# Patient Record
Sex: FEMALE | Race: BLACK OR AFRICAN AMERICAN | NOT HISPANIC OR LATINO
[De-identification: names, ages, dates, MRNs, and addresses within clinical notes are randomized per-mention and may not be internally consistent; named-entity substitution may affect disease eponyms.]

---

## 2017-09-25 ENCOUNTER — APPOINTMENT (OUTPATIENT)
Dept: CARDIOLOGY | Facility: CLINIC | Age: 67
End: 2017-09-25

## 2017-11-01 ENCOUNTER — APPOINTMENT (OUTPATIENT)
Dept: CARDIOLOGY | Facility: CLINIC | Age: 67
End: 2017-11-01

## 2021-10-25 ENCOUNTER — NON-APPOINTMENT (OUTPATIENT)
Age: 71
End: 2021-10-25

## 2021-10-25 ENCOUNTER — APPOINTMENT (OUTPATIENT)
Dept: CARDIOLOGY | Facility: CLINIC | Age: 71
End: 2021-10-25
Payer: COMMERCIAL

## 2021-10-25 VITALS
DIASTOLIC BLOOD PRESSURE: 76 MMHG | OXYGEN SATURATION: 97 % | BODY MASS INDEX: 29.25 KG/M2 | HEART RATE: 83 BPM | WEIGHT: 182 LBS | HEIGHT: 66 IN | SYSTOLIC BLOOD PRESSURE: 132 MMHG

## 2021-10-25 DIAGNOSIS — Z82.49 FAMILY HISTORY OF ISCHEMIC HEART DISEASE AND OTHER DISEASES OF THE CIRCULATORY SYSTEM: ICD-10-CM

## 2021-10-25 DIAGNOSIS — R94.39 ABNORMAL RESULT OF OTHER CARDIOVASCULAR FUNCTION STUDY: ICD-10-CM

## 2021-10-25 DIAGNOSIS — Z82.3 FAMILY HISTORY OF STROKE: ICD-10-CM

## 2021-10-25 PROCEDURE — 93000 ELECTROCARDIOGRAM COMPLETE: CPT

## 2021-10-25 PROCEDURE — 99204 OFFICE O/P NEW MOD 45 MIN: CPT

## 2021-10-25 RX ORDER — AZITHROMYCIN 250 MG/1
250 TABLET, FILM COATED ORAL
Qty: 6 | Refills: 0 | Status: DISCONTINUED | COMMUNITY
Start: 2021-05-22 | End: 2021-10-25

## 2021-10-25 RX ORDER — LISINOPRIL 10 MG/1
10 TABLET ORAL
Qty: 30 | Refills: 0 | Status: ACTIVE | COMMUNITY
Start: 2021-08-24

## 2021-10-25 RX ORDER — DICLOFENAC SODIUM 100 MG/1
100 TABLET, FILM COATED, EXTENDED RELEASE ORAL
Qty: 30 | Refills: 0 | Status: DISCONTINUED | COMMUNITY
Start: 2021-07-21 | End: 2021-10-25

## 2021-10-25 RX ORDER — MELOXICAM 15 MG/1
15 TABLET ORAL
Qty: 30 | Refills: 0 | Status: DISCONTINUED | COMMUNITY
Start: 2021-07-17 | End: 2021-10-25

## 2021-10-25 RX ORDER — ASPIRIN ENTERIC COATED TABLETS 81 MG 81 MG/1
81 TABLET, DELAYED RELEASE ORAL
Qty: 90 | Refills: 3 | Status: ACTIVE | COMMUNITY
Start: 2021-10-25

## 2021-10-25 RX ORDER — GLIMEPIRIDE 2 MG/1
2 TABLET ORAL
Qty: 30 | Refills: 0 | Status: ACTIVE | COMMUNITY
Start: 2021-08-25

## 2021-10-25 RX ORDER — FAMOTIDINE 40 MG/1
40 TABLET, FILM COATED ORAL
Qty: 30 | Refills: 0 | Status: DISCONTINUED | COMMUNITY
Start: 2021-07-17 | End: 2021-10-25

## 2021-10-25 RX ORDER — METHYLPREDNISOLONE 4 MG/1
4 TABLET ORAL
Qty: 21 | Refills: 0 | Status: DISCONTINUED | COMMUNITY
Start: 2021-08-05 | End: 2021-10-25

## 2021-10-25 NOTE — DISCUSSION/SUMMARY
[FreeTextEntry1] : The patient was examined.  Her blood pressure was 132/76, and her pulse was 83.  Her lungs were clear to auscultation.  Cardiac exam was negative for murmurs rubs or gallops.  Her lower extremities showed trace edema.  The patient was strongly urged to cut out sodium in her diet.  She was told to walk more.  We will start her on indapamide 1.25 mg daily in addition to her other medications.  She will return in 1 month, or earlier if needed.  Total time spent on the day of the encounter was 49  minutes which includes  face-to-face and non face-to-face times personally spent by the physician preparing to see the patient, obtaining  separately obtained history, performing a medically appropriate exam and evaluation, counseling, educating, talking to the family or caregivers, ordering medicines, ordering tests or procedures, referring and communicating with other healthcare  professionals, and documenting clinical information in the electronic health record.

## 2021-10-25 NOTE — REASON FOR VISIT
[FreeTextEntry1] : This is the first office visit for this 71 -year-old female who came back from a trip to [place] to see her family.  She noted that on the trip her legs were swollen.  She also complains of dyspnea on exertion.  She has now been back for 10 days and although they are less swollen they still have a slight swelling.  She is a known hypertensive on amlodipine 10 mg as well as lisinopril 10 mg which she does not take every day.  She takes glimepiride 2 mg for diabetes.  She does have dyspnea on exertion but admits to not walking very much.  She had a stress echocardiogram 5 months ago which showed some mild ischemic changes.  Her ejection fractions were normal with there was segmental wall motion abnormalities.\par She is a nondrinker non-smoker but does have 2 cups of caffeinated tea a day.\par \par Her father  of a myocardial infarction.  Her mother  of a cerebrovascular accident.\par \par She takes glimepiride 2 mg, amlodipine 10 mg, and lisinopril 10 mg although irregularly.  She also takes an aspirin 81 mg

## 2022-03-08 ENCOUNTER — APPOINTMENT (OUTPATIENT)
Dept: CARDIOLOGY | Facility: CLINIC | Age: 72
End: 2022-03-08
Payer: COMMERCIAL

## 2022-03-08 ENCOUNTER — NON-APPOINTMENT (OUTPATIENT)
Age: 72
End: 2022-03-08

## 2022-03-08 VITALS
HEIGHT: 66 IN | BODY MASS INDEX: 29.73 KG/M2 | WEIGHT: 185 LBS | HEART RATE: 81 BPM | DIASTOLIC BLOOD PRESSURE: 79 MMHG | RESPIRATION RATE: 17 BRPM | OXYGEN SATURATION: 99 % | SYSTOLIC BLOOD PRESSURE: 137 MMHG

## 2022-03-08 DIAGNOSIS — Z00.00 ENCOUNTER FOR GENERAL ADULT MEDICAL EXAMINATION W/OUT ABNORMAL FINDINGS: ICD-10-CM

## 2022-03-08 PROCEDURE — 93000 ELECTROCARDIOGRAM COMPLETE: CPT

## 2022-03-08 PROCEDURE — 99214 OFFICE O/P EST MOD 30 MIN: CPT

## 2022-03-08 RX ORDER — ERGOCALCIFEROL 1.25 MG/1
1.25 MG CAPSULE, LIQUID FILLED ORAL
Qty: 13 | Refills: 1 | Status: ACTIVE | COMMUNITY
Start: 2021-06-01 | End: 1900-01-01

## 2022-03-08 RX ORDER — AMLODIPINE BESYLATE 10 MG/1
10 TABLET ORAL DAILY
Qty: 90 | Refills: 3 | Status: ACTIVE | COMMUNITY
Start: 2021-08-24 | End: 1900-01-01

## 2022-03-08 RX ORDER — CYANOCOBALAMIN (VITAMIN B-12) 2000 MCG
2000 TABLET, EXTENDED RELEASE ORAL DAILY
Qty: 90 | Refills: 3 | Status: ACTIVE | COMMUNITY
Start: 2022-03-08 | End: 1900-01-01

## 2022-03-08 RX ORDER — INDAPAMIDE 1.25 MG/1
1.25 TABLET, FILM COATED ORAL
Qty: 90 | Refills: 3 | Status: ACTIVE | COMMUNITY
Start: 2021-10-25 | End: 1900-01-01

## 2022-03-08 NOTE — REVIEW OF SYSTEMS
[Dyspnea on exertion] : dyspnea during exertion [Lower Ext Edema] : lower extremity edema [Negative] : Heme/Lymph [SOB] : shortness of breath [Chest Discomfort] : no chest discomfort

## 2022-03-08 NOTE — REASON FOR VISIT
[FreeTextEntry1] : This is the 2nd  office visit for this 72 -year-old female who came back from a trip to [place] to see her family.  She noted that on the trip her legs were swollen.  She also complains of dyspnea on exertion.  She has now been back for 10 days and although they are less swollen they still have a slight swelling.  She is a known hypertensive on amlodipine 10 mg as well as lisinopril 10 mg which she does not take every day.  She takes glimepiride 2 mg for diabetes.  She does have dyspnea on exertion but admits to not walking very much.  She had a stress echocardiogram 5 months ago which showed some mild ischemic changes.  Her ejection fractions were normal but  there were  segmental wall motion abnormalities.\par : Within a few weeks of another one another the patient lost her brother to sepsis and he was in [facility] and she lost a sister to multiple sclerosis and she was in [place].  The patient feels short of breath occasionally but it may be the need to sigh from the emotional upset because she is crying all the time.  She denies any chest pains but does get occasional palpitations.\par She is a nondrinker non-smoker but does have 2 cups of caffeinated tea a day.\par \par Her father  of a myocardial infarction.  Her mother  of a cerebrovascular accident.\par \par She takes glimepiride 2 mg, amlodipine 10 mg, and lisinopril 10 mg although irregularly.  She also takes an aspirin 81 mg

## 2022-03-08 NOTE — DISCUSSION/SUMMARY
[FreeTextEntry1] : The patient was examined.  Her blood pressure was 137/79, and her pulse was 81.  Her lungs were clear to auscultation.  Cardiac exam was negative for murmurs rubs or gallops.  Her lower extremities showed trace edema.  The patient was strongly urged to cut out sodium in her diet.  She was told to walk more.  We will continue t her on indapamide 1.25 mg daily in addition to her other medications.  She will return in 4 months, or earlier if needed.  Total time spent on the day of the encounter was 49  minutes which includes  face-to-face and non face-to-face times personally spent by the physician preparing to see the patient, obtaining  separately obtained history, performing a medically appropriate exam and evaluation, counseling, educating, talking to the family or caregivers, ordering medicines, ordering tests or procedures, referring and communicating with other healthcare  professionals, and documenting clinical information in the electronic health record.

## 2022-07-12 ENCOUNTER — APPOINTMENT (OUTPATIENT)
Dept: CARDIOLOGY | Facility: CLINIC | Age: 72
End: 2022-07-12

## 2022-07-12 ENCOUNTER — NON-APPOINTMENT (OUTPATIENT)
Age: 72
End: 2022-07-12

## 2022-07-12 VITALS
WEIGHT: 181 LBS | SYSTOLIC BLOOD PRESSURE: 122 MMHG | HEART RATE: 79 BPM | OXYGEN SATURATION: 97 % | DIASTOLIC BLOOD PRESSURE: 71 MMHG | BODY MASS INDEX: 29.21 KG/M2

## 2022-07-12 DIAGNOSIS — E55.9 VITAMIN D DEFICIENCY, UNSPECIFIED: ICD-10-CM

## 2022-07-12 DIAGNOSIS — R60.0 LOCALIZED EDEMA: ICD-10-CM

## 2022-07-12 DIAGNOSIS — I10 ESSENTIAL (PRIMARY) HYPERTENSION: ICD-10-CM

## 2022-07-12 DIAGNOSIS — R06.00 DYSPNEA, UNSPECIFIED: ICD-10-CM

## 2022-07-12 PROCEDURE — 99214 OFFICE O/P EST MOD 30 MIN: CPT

## 2022-07-12 PROCEDURE — 93000 ELECTROCARDIOGRAM COMPLETE: CPT

## 2022-07-12 RX ORDER — AMOXICILLIN 875 MG/1
875 TABLET, FILM COATED ORAL
Qty: 20 | Refills: 0 | Status: ACTIVE | COMMUNITY
Start: 2022-05-04

## 2022-07-12 RX ORDER — IRON POLYSACCHARIDE COMPLEX 150 MG
150 CAPSULE ORAL
Qty: 30 | Refills: 0 | Status: ACTIVE | COMMUNITY
Start: 2022-05-03

## 2022-07-12 RX ORDER — CHLORHEXIDINE GLUCONATE, 0.12% ORAL RINSE 1.2 MG/ML
0.12 SOLUTION DENTAL
Qty: 118 | Refills: 0 | Status: ACTIVE | COMMUNITY
Start: 2022-04-27

## 2022-07-12 NOTE — REASON FOR VISIT
[FreeTextEntry1] : This is the 3rd   office visit for this 72 -year-old female who came back from a trip to [place] to see her family.  She noted that on the trip her legs were swollen.  She also complains of dyspnea on exertion.  She has now been back for 10 days and although they are less swollen they still have a slight swelling.  She is a known hypertensive on amlodipine 10 mg as well as lisinopril 10 mg which she does not take every day.  She takes glimepiride 2 mg for diabetes.  She does have dyspnea on exertion but admits to not walking very much.  She had a stress echocardiogram 5 months ago which showed some mild ischemic changes.  Her ejection fractions were normal but  there were  segmental wall motion abnormalities.\par 3/8/2022: Within a few weeks of another one another the patient lost her brother to sepsis and he was in [facility] and she lost a sister to multiple sclerosis and she was in [place].  The patient feels short of breath occasionally but it may be the need to sigh from the emotional upset because she is crying all the time.  She denies any chest pains but does get occasional palpitations.\par 2022: The patient has no new symptoms.  She is feeling better.  She denies any chest pains, shortness of breath at rest, or palpitations.  She does have some dyspnea on exertion.\par She is a nondrinker non-smoker but does have 2 cups of caffeinated tea a day.\par \par Her father  of a myocardial infarction.  Her mother  of a cerebrovascular accident.\par \par She takes glimepiride 2 mg, amlodipine 10 mg, and lisinopril 10 mg although irregularly.  She also takes an aspirin 81 mg

## 2022-07-12 NOTE — REVIEW OF SYSTEMS
[Dyspnea on exertion] : dyspnea during exertion [Lower Ext Edema] : lower extremity edema [Negative] : Heme/Lymph [SOB] : no shortness of breath [Chest Discomfort] : no chest discomfort

## 2022-07-12 NOTE — DISCUSSION/SUMMARY
[EKG obtained to assist in diagnosis and management of assessed problem(s)] : EKG obtained to assist in diagnosis and management of assessed problem(s) [FreeTextEntry1] : The patient was examined.  Her blood pressure was 122/71, and her pulse was 79.  Her lungs were clear to auscultation.  Cardiac exam was negative for murmurs rubs or gallops.  Her lower extremities showed trace edema.  The patient was strongly urged to cut out sodium in her diet.  She was told to walk more.  We will continue t her on indapamide 1.25 mg daily in addition to her other medications.  She will return in 4 months, or earlier if needed.  Total time spent on the day of the encounter was 33 minutes which includes  face-to-face and non face-to-face times personally spent by the physician preparing to see the patient, obtaining  separately obtained history, performing a medically appropriate exam and evaluation, counseling, educating, talking to the family or caregivers, ordering medicines, ordering tests or procedures, referring and communicating with other healthcare  professionals, and documenting clinical information in the electronic health record.

## 2023-07-10 NOTE — REVIEW OF SYSTEMS
[Dyspnea on exertion] : dyspnea during exertion [Chest Discomfort] : chest discomfort [Lower Ext Edema] : lower extremity edema [Negative] : Heme/Lymph no

## 2023-10-19 ENCOUNTER — EMERGENCY (EMERGENCY)
Facility: HOSPITAL | Age: 73
LOS: 1 days | Discharge: HOME | End: 2023-10-19
Admitting: EMERGENCY MEDICINE
Payer: COMMERCIAL

## 2023-10-19 VITALS
TEMPERATURE: 98 F | HEART RATE: 74 BPM | SYSTOLIC BLOOD PRESSURE: 153 MMHG | OXYGEN SATURATION: 100 % | RESPIRATION RATE: 16 BRPM | DIASTOLIC BLOOD PRESSURE: 59 MMHG

## 2023-10-19 LAB
FLUAV AG NPH QL: SIGNIFICANT CHANGE UP
FLUAV AG NPH QL: SIGNIFICANT CHANGE UP
FLUBV AG NPH QL: SIGNIFICANT CHANGE UP
FLUBV AG NPH QL: SIGNIFICANT CHANGE UP
RSV RNA NPH QL NAA+NON-PROBE: SIGNIFICANT CHANGE UP
RSV RNA NPH QL NAA+NON-PROBE: SIGNIFICANT CHANGE UP
SARS-COV-2 RNA SPEC QL NAA+PROBE: SIGNIFICANT CHANGE UP
SARS-COV-2 RNA SPEC QL NAA+PROBE: SIGNIFICANT CHANGE UP

## 2023-10-19 PROCEDURE — 99283 EMERGENCY DEPT VISIT LOW MDM: CPT

## 2023-10-19 NOTE — ED PROVIDER NOTE - CLINICAL SUMMARY MEDICAL DECISION MAKING FREE TEXT BOX
72 y/o female with pmhx of DM and HTN presents to ED requesting COVID test.  Patient states her  is in emergency room and tested positive for COVID with flulike symptoms and live together.  Pt is asymptomatic. plan to check covid swab and dc home.

## 2023-10-19 NOTE — ED ADULT TRIAGE NOTE - CHIEF COMPLAINT QUOTE
pt ambulated into triage with son.  is a pt in the ED was found to be covid positive and is requesting Covid test. denies covid like symptoms.

## 2023-10-19 NOTE — ED ADULT NURSE NOTE - NSFALLUNIVINTERV_ED_ALL_ED
Bed/Stretcher in lowest position, wheels locked, appropriate side rails in place/Call bell, personal items and telephone in reach/Instruct patient to call for assistance before getting out of bed/chair/stretcher/Non-slip footwear applied when patient is off stretcher/[place] to call system/Physically safe environment - no spills, clutter or unnecessary equipment/Purposeful proactive rounding/Room/bathroom lighting operational, light cord in reach

## 2023-10-19 NOTE — ED PROVIDER NOTE - PATIENT PORTAL LINK FT
You can access the FollowMyHealth Patient Portal offered by [facility] by registering at the following website: [email or web address]. By joining [facility]’s FollowMyHealth portal, you will also be able to view your health information using other applications (apps) compatible with our system.

## 2023-10-19 NOTE — ED ADULT NURSE NOTE - CHIEF COMPLAINT QUOTE
pt ambulated into triage with son.  is a pt in the ED was found to be covid positive and is requesting Covid test. denies covid like symptoms.
Color consistent with ethnicity/race, warm, dry intact, resilient.

## 2023-10-19 NOTE — ED PROVIDER NOTE - OBJECTIVE STATEMENT
72 y/o female with pmhx of DM and HTN presents to ED requesting COVID test.  Patient states her  is in emergency room and tested positive for COVID with flulike symptoms and live together.  Pt is asymptomatic. Denies any fevers chills chest pain shortness of breath cough nausea vomiting diarrhea.

## 2023-10-19 NOTE — ED PROVIDER NOTE - NSFOLLOWUPINSTRUCTIONS_ED_ALL_ED_FT
IF TEST POSITIVE:    -- Regardless of vaccination status, stay home for 5 days. If you have no symptoms after 5 days, you can leave your house. Continue to wear a mask around others for 5 ADDITIONAL days. If you have a fever, continue to stay home until your fever resolves.      -- Rest, increase your fluid intake, and avoid dehydration.  -- You can check your oxygen levels at home with a Pulse Oximeter device (pulse ox).  -- Check your temperature at home with a thermometer, take Tylenol for fever of 100.4 or greater.  -- It is very important to be diligent about hand washing & hygiene, wearing a mask, and self quarantining to avoid spreading the illness to others.  -- If you are having any new or worsening symptoms, such as shortness of breath, chest pain, please see your doctor or return to the Emergency Department.

## 2023-10-19 NOTE — ED ADULT NURSE NOTE - OBJECTIVE STATEMENT
Patient received to wellness, a&ox4, ambulatory.  pts  is a pt in the ED was found to be covid positive. pt  is requesting Covid test. denies covid like symptoms; chets pain, sob, cough, n+v, headache, fever, chills. Breathing even, unlabored.

## 2023-10-20 NOTE — ED POST DISCHARGE NOTE - RESULT SUMMARY
patient called requesting RVP results. neg. Advised PCP f/u. Strict ED return precautions discussed. Patient understands and agrees.

## 2024-04-10 ENCOUNTER — INPATIENT (INPATIENT)
Facility: HOSPITAL | Age: 74
LOS: 4 days | Discharge: HOSPICE | End: 2024-04-15
Attending: HOSPITALIST | Admitting: HOSPITALIST
Payer: MEDICARE

## 2024-04-10 VITALS
HEART RATE: 49 BPM | RESPIRATION RATE: 18 BRPM | TEMPERATURE: 98 F | OXYGEN SATURATION: 96 % | DIASTOLIC BLOOD PRESSURE: 47 MMHG | SYSTOLIC BLOOD PRESSURE: 83 MMHG

## 2024-04-10 DIAGNOSIS — I95.9 HYPOTENSION, UNSPECIFIED: ICD-10-CM

## 2024-04-10 DIAGNOSIS — E87.1 HYPO-OSMOLALITY AND HYPONATREMIA: ICD-10-CM

## 2024-04-10 DIAGNOSIS — C22.0 LIVER CELL CARCINOMA: ICD-10-CM

## 2024-04-10 DIAGNOSIS — Z29.9 ENCOUNTER FOR PROPHYLACTIC MEASURES, UNSPECIFIED: ICD-10-CM

## 2024-04-10 DIAGNOSIS — E11.9 TYPE 2 DIABETES MELLITUS WITHOUT COMPLICATIONS: ICD-10-CM

## 2024-04-10 DIAGNOSIS — E16.2 HYPOGLYCEMIA, UNSPECIFIED: ICD-10-CM

## 2024-04-10 PROBLEM — I10 ESSENTIAL (PRIMARY) HYPERTENSION: Chronic | Status: ACTIVE | Noted: 2023-10-19

## 2024-04-10 LAB
ALBUMIN SERPL ELPH-MCNC: 2.5 G/DL — LOW (ref 3.3–5)
ALBUMIN SERPL ELPH-MCNC: 2.8 G/DL — LOW (ref 3.3–5)
ALP SERPL-CCNC: 423 U/L — HIGH (ref 40–120)
ALP SERPL-CCNC: 494 U/L — HIGH (ref 40–120)
ALT FLD-CCNC: 102 U/L — HIGH (ref 4–33)
ALT FLD-CCNC: 97 U/L — HIGH (ref 4–33)
ANION GAP SERPL CALC-SCNC: 13 MMOL/L — SIGNIFICANT CHANGE UP (ref 7–14)
ANION GAP SERPL CALC-SCNC: 16 MMOL/L — HIGH (ref 7–14)
ANION GAP SERPL CALC-SCNC: 18 MMOL/L — HIGH (ref 7–14)
ANISOCYTOSIS BLD QL: SLIGHT — SIGNIFICANT CHANGE UP
APPEARANCE UR: ABNORMAL
AST SERPL-CCNC: 241 U/L — HIGH (ref 4–32)
AST SERPL-CCNC: 244 U/L — HIGH (ref 4–32)
BACTERIA # UR AUTO: ABNORMAL /HPF
BASE EXCESS BLDV CALC-SCNC: -1.2 MMOL/L — SIGNIFICANT CHANGE UP (ref -2–3)
BASE EXCESS BLDV CALC-SCNC: -5.1 MMOL/L — LOW (ref -2–3)
BASOPHILS # BLD AUTO: 0 K/UL — SIGNIFICANT CHANGE UP (ref 0–0.2)
BASOPHILS NFR BLD AUTO: 0 % — SIGNIFICANT CHANGE UP (ref 0–2)
BILIRUB DIRECT SERPL-MCNC: >20 MG/DL — HIGH (ref 0–0.3)
BILIRUB DIRECT SERPL-MCNC: >20 MG/DL — HIGH (ref 0–0.3)
BILIRUB INDIRECT FLD-MCNC: <13.8 MG/DL — HIGH (ref 0–1)
BILIRUB INDIRECT FLD-MCNC: <14.2 MG/DL — HIGH (ref 0–1)
BILIRUB SERPL-MCNC: 33.8 MG/DL — HIGH (ref 0.2–1.2)
BILIRUB SERPL-MCNC: 34.2 MG/DL — HIGH (ref 0.2–1.2)
BILIRUB SERPL-MCNC: 34.2 MG/DL — HIGH (ref 0.2–1.2)
BILIRUB SERPL-MCNC: 34.9 MG/DL — HIGH (ref 0.2–1.2)
BILIRUB UR-MCNC: ABNORMAL
BLOOD GAS VENOUS COMPREHENSIVE RESULT: SIGNIFICANT CHANGE UP
BUN SERPL-MCNC: 49 MG/DL — HIGH (ref 7–23)
BUN SERPL-MCNC: 50 MG/DL — HIGH (ref 7–23)
BUN SERPL-MCNC: 54 MG/DL — HIGH (ref 7–23)
CA-I SERPL-SCNC: 1.07 MMOL/L — LOW (ref 1.15–1.33)
CALCIUM SERPL-MCNC: 7.7 MG/DL — LOW (ref 8.4–10.5)
CALCIUM SERPL-MCNC: 7.7 MG/DL — LOW (ref 8.4–10.5)
CALCIUM SERPL-MCNC: 8.5 MG/DL — SIGNIFICANT CHANGE UP (ref 8.4–10.5)
CAST: 27 /LPF — HIGH (ref 0–4)
CHLORIDE BLDV-SCNC: 90 MMOL/L — LOW (ref 96–108)
CHLORIDE BLDV-SCNC: 95 MMOL/L — LOW (ref 96–108)
CHLORIDE SERPL-SCNC: 87 MMOL/L — LOW (ref 98–107)
CHLORIDE SERPL-SCNC: 93 MMOL/L — LOW (ref 98–107)
CHLORIDE SERPL-SCNC: 94 MMOL/L — LOW (ref 98–107)
CHLORIDE UR-SCNC: <20 MMOL/L — SIGNIFICANT CHANGE UP
CO2 BLDV-SCNC: 24 MMOL/L — SIGNIFICANT CHANGE UP (ref 22–26)
CO2 BLDV-SCNC: 24.8 MMOL/L — SIGNIFICANT CHANGE UP (ref 22–26)
CO2 SERPL-SCNC: 18 MMOL/L — LOW (ref 22–31)
CO2 SERPL-SCNC: 20 MMOL/L — LOW (ref 22–31)
CO2 SERPL-SCNC: 20 MMOL/L — LOW (ref 22–31)
COLOR SPEC: ABNORMAL
CREAT ?TM UR-MCNC: 200 MG/DL — SIGNIFICANT CHANGE UP
CREAT SERPL-MCNC: 2.15 MG/DL — HIGH (ref 0.5–1.3)
CREAT SERPL-MCNC: 2.21 MG/DL — HIGH (ref 0.5–1.3)
CREAT SERPL-MCNC: 2.43 MG/DL — HIGH (ref 0.5–1.3)
DIFF PNL FLD: NEGATIVE — SIGNIFICANT CHANGE UP
EGFR: 20 ML/MIN/1.73M2 — LOW
EGFR: 23 ML/MIN/1.73M2 — LOW
EGFR: 23 ML/MIN/1.73M2 — LOW
EOSINOPHIL # BLD AUTO: 0 K/UL — SIGNIFICANT CHANGE UP (ref 0–0.5)
EOSINOPHIL NFR BLD AUTO: 0 % — SIGNIFICANT CHANGE UP (ref 0–6)
GAS PNL BLDV: 122 MMOL/L — LOW (ref 136–145)
GAS PNL BLDV: 122 MMOL/L — LOW (ref 136–145)
GAS PNL BLDV: SIGNIFICANT CHANGE UP
GAS PNL BLDV: SIGNIFICANT CHANGE UP
GLUCOSE BLDV-MCNC: 36 MG/DL — CRITICAL LOW (ref 70–99)
GLUCOSE BLDV-MCNC: 73 MG/DL — SIGNIFICANT CHANGE UP (ref 70–99)
GLUCOSE SERPL-MCNC: 35 MG/DL — CRITICAL LOW (ref 70–99)
GLUCOSE SERPL-MCNC: 72 MG/DL — SIGNIFICANT CHANGE UP (ref 70–99)
GLUCOSE SERPL-MCNC: 73 MG/DL — SIGNIFICANT CHANGE UP (ref 70–99)
GLUCOSE UR QL: NEGATIVE MG/DL — SIGNIFICANT CHANGE UP
HCO3 BLDV-SCNC: 22 MMOL/L — SIGNIFICANT CHANGE UP (ref 22–29)
HCO3 BLDV-SCNC: 24 MMOL/L — SIGNIFICANT CHANGE UP (ref 22–29)
HCT VFR BLD CALC: 35.5 % — SIGNIFICANT CHANGE UP (ref 34.5–45)
HCT VFR BLD CALC: 35.6 % — SIGNIFICANT CHANGE UP (ref 34.5–45)
HCT VFR BLDA CALC: 36 % — SIGNIFICANT CHANGE UP (ref 34.5–46.5)
HCT VFR BLDA CALC: 39 % — SIGNIFICANT CHANGE UP (ref 34.5–46.5)
HGB BLD CALC-MCNC: 12.1 G/DL — SIGNIFICANT CHANGE UP (ref 11.7–16.1)
HGB BLD CALC-MCNC: 13 G/DL — SIGNIFICANT CHANGE UP (ref 11.7–16.1)
HGB BLD-MCNC: 12.1 G/DL — SIGNIFICANT CHANGE UP (ref 11.5–15.5)
HGB BLD-MCNC: 12.5 G/DL — SIGNIFICANT CHANGE UP (ref 11.5–15.5)
IANC: 6.28 K/UL — SIGNIFICANT CHANGE UP (ref 1.8–7.4)
INR BLD: 1.54 RATIO — HIGH (ref 0.85–1.18)
KETONES UR-MCNC: NEGATIVE MG/DL — SIGNIFICANT CHANGE UP
LACTATE BLDV-MCNC: 2.6 MMOL/L — HIGH (ref 0.5–2)
LACTATE BLDV-MCNC: 2.7 MMOL/L — HIGH (ref 0.5–2)
LACTATE BLDV-MCNC: 2.7 MMOL/L — HIGH (ref 0.5–2)
LEUKOCYTE ESTERASE UR-ACNC: ABNORMAL
LYMPHOCYTES # BLD AUTO: 1.02 K/UL — SIGNIFICANT CHANGE UP (ref 1–3.3)
LYMPHOCYTES # BLD AUTO: 11 % — LOW (ref 13–44)
MACROCYTES BLD QL: SLIGHT — SIGNIFICANT CHANGE UP
MAGNESIUM SERPL-MCNC: 2.4 MG/DL — SIGNIFICANT CHANGE UP (ref 1.6–2.6)
MAGNESIUM SERPL-MCNC: 2.5 MG/DL — SIGNIFICANT CHANGE UP (ref 1.6–2.6)
MANUAL SMEAR VERIFICATION: SIGNIFICANT CHANGE UP
MCHC RBC-ENTMCNC: 33.3 PG — SIGNIFICANT CHANGE UP (ref 27–34)
MCHC RBC-ENTMCNC: 33.4 PG — SIGNIFICANT CHANGE UP (ref 27–34)
MCHC RBC-ENTMCNC: 34 GM/DL — SIGNIFICANT CHANGE UP (ref 32–36)
MCHC RBC-ENTMCNC: 35.2 GM/DL — SIGNIFICANT CHANGE UP (ref 32–36)
MCV RBC AUTO: 94.7 FL — SIGNIFICANT CHANGE UP (ref 80–100)
MCV RBC AUTO: 98.3 FL — SIGNIFICANT CHANGE UP (ref 80–100)
METAMYELOCYTES # FLD: 1 % — SIGNIFICANT CHANGE UP (ref 0–1)
MONOCYTES # BLD AUTO: 1.39 K/UL — HIGH (ref 0–0.9)
MONOCYTES NFR BLD AUTO: 15 % — HIGH (ref 2–14)
NEUTROPHILS # BLD AUTO: 6.68 K/UL — SIGNIFICANT CHANGE UP (ref 1.8–7.4)
NEUTROPHILS NFR BLD AUTO: 72 % — SIGNIFICANT CHANGE UP (ref 43–77)
NITRITE UR-MCNC: POSITIVE
NRBC # BLD: 0 /100 WBCS — SIGNIFICANT CHANGE UP (ref 0–0)
NRBC # BLD: 0 /100 WBCS — SIGNIFICANT CHANGE UP (ref 0–0)
NRBC # FLD: 0.04 K/UL — HIGH (ref 0–0)
OSMOLALITY SERPL: 298 MOSM/KG — HIGH (ref 275–295)
OSMOLALITY UR: 332 MOSM/KG — SIGNIFICANT CHANGE UP (ref 50–1200)
OSMOLALITY UR: 491 MOSM/KG — SIGNIFICANT CHANGE UP (ref 50–1200)
PCO2 BLDV: 39 MMHG — SIGNIFICANT CHANGE UP (ref 39–52)
PCO2 BLDV: 51 MMHG — SIGNIFICANT CHANGE UP (ref 39–52)
PH BLDV: 7.25 — LOW (ref 7.32–7.43)
PH BLDV: 7.39 — SIGNIFICANT CHANGE UP (ref 7.32–7.43)
PH UR: 5.5 — SIGNIFICANT CHANGE UP (ref 5–8)
PHOSPHATE SERPL-MCNC: 5 MG/DL — HIGH (ref 2.5–4.5)
PHOSPHATE SERPL-MCNC: 5.1 MG/DL — HIGH (ref 2.5–4.5)
PLAT MORPH BLD: NORMAL — SIGNIFICANT CHANGE UP
PLATELET # BLD AUTO: 152 K/UL — SIGNIFICANT CHANGE UP (ref 150–400)
PLATELET # BLD AUTO: 174 K/UL — SIGNIFICANT CHANGE UP (ref 150–400)
PLATELET COUNT - ESTIMATE: NORMAL — SIGNIFICANT CHANGE UP
PO2 BLDV: 39 MMHG — SIGNIFICANT CHANGE UP (ref 25–45)
PO2 BLDV: 72 MMHG — HIGH (ref 25–45)
POLYCHROMASIA BLD QL SMEAR: SLIGHT — SIGNIFICANT CHANGE UP
POTASSIUM BLDV-SCNC: 4.6 MMOL/L — SIGNIFICANT CHANGE UP (ref 3.5–5.1)
POTASSIUM BLDV-SCNC: 5 MMOL/L — SIGNIFICANT CHANGE UP (ref 3.5–5.1)
POTASSIUM SERPL-MCNC: 4.3 MMOL/L — SIGNIFICANT CHANGE UP (ref 3.5–5.3)
POTASSIUM SERPL-MCNC: 4.5 MMOL/L — SIGNIFICANT CHANGE UP (ref 3.5–5.3)
POTASSIUM SERPL-MCNC: 4.8 MMOL/L — SIGNIFICANT CHANGE UP (ref 3.5–5.3)
POTASSIUM SERPL-SCNC: 4.3 MMOL/L — SIGNIFICANT CHANGE UP (ref 3.5–5.3)
POTASSIUM SERPL-SCNC: 4.5 MMOL/L — SIGNIFICANT CHANGE UP (ref 3.5–5.3)
POTASSIUM SERPL-SCNC: 4.8 MMOL/L — SIGNIFICANT CHANGE UP (ref 3.5–5.3)
POTASSIUM UR-SCNC: 65 MMOL/L — SIGNIFICANT CHANGE UP
POTASSIUM UR-SCNC: >100 MMOL/L — SIGNIFICANT CHANGE UP
PROT ?TM UR-MCNC: 158 MG/DL — SIGNIFICANT CHANGE UP
PROT SERPL-MCNC: 5.5 G/DL — LOW (ref 6–8.3)
PROT SERPL-MCNC: 5.7 G/DL — LOW (ref 6–8.3)
PROT UR-MCNC: 30 MG/DL
PROT/CREAT UR-RTO: 0.8 RATIO — HIGH (ref 0–0.2)
PROTHROM AB SERPL-ACNC: 17.2 SEC — HIGH (ref 9.5–13)
RBC # BLD: 3.62 M/UL — LOW (ref 3.8–5.2)
RBC # BLD: 3.75 M/UL — LOW (ref 3.8–5.2)
RBC # FLD: 19.9 % — HIGH (ref 10.3–14.5)
RBC # FLD: 20.4 % — HIGH (ref 10.3–14.5)
RBC BLD AUTO: ABNORMAL
RBC CASTS # UR COMP ASSIST: 42 /HPF — HIGH (ref 0–4)
REVIEW: SIGNIFICANT CHANGE UP
SAO2 % BLDV: 54 % — LOW (ref 67–88)
SAO2 % BLDV: 93.3 % — HIGH (ref 67–88)
SCHISTOCYTES BLD QL AUTO: SLIGHT — SIGNIFICANT CHANGE UP
SODIUM SERPL-SCNC: 125 MMOL/L — LOW (ref 135–145)
SODIUM SERPL-SCNC: 127 MMOL/L — LOW (ref 135–145)
SODIUM SERPL-SCNC: 127 MMOL/L — LOW (ref 135–145)
SODIUM UR-SCNC: <20 MMOL/L — SIGNIFICANT CHANGE UP
SODIUM UR-SCNC: <20 MMOL/L — SIGNIFICANT CHANGE UP
SP GR SPEC: 1.02 — SIGNIFICANT CHANGE UP (ref 1–1.03)
SQUAMOUS # UR AUTO: 45 /HPF — HIGH (ref 0–5)
TARGETS BLD QL SMEAR: SLIGHT — SIGNIFICANT CHANGE UP
TROPONIN T, HIGH SENSITIVITY RESULT: 16 NG/L — SIGNIFICANT CHANGE UP
TROPONIN T, HIGH SENSITIVITY RESULT: 22 NG/L — SIGNIFICANT CHANGE UP
TSH SERPL-MCNC: <0.1 UIU/ML — LOW (ref 0.27–4.2)
UROBILINOGEN FLD QL: 1 MG/DL — SIGNIFICANT CHANGE UP (ref 0.2–1)
UUN UR-MCNC: 570 MG/DL — SIGNIFICANT CHANGE UP
VARIANT LYMPHS # BLD: 1 % — SIGNIFICANT CHANGE UP (ref 0–6)
WBC # BLD: 7.05 K/UL — SIGNIFICANT CHANGE UP (ref 3.8–10.5)
WBC # BLD: 9.28 K/UL — SIGNIFICANT CHANGE UP (ref 3.8–10.5)
WBC # FLD AUTO: 7.05 K/UL — SIGNIFICANT CHANGE UP (ref 3.8–10.5)
WBC # FLD AUTO: 9.28 K/UL — SIGNIFICANT CHANGE UP (ref 3.8–10.5)
WBC UR QL: 6 /HPF — HIGH (ref 0–5)

## 2024-04-10 PROCEDURE — 99223 1ST HOSP IP/OBS HIGH 75: CPT | Mod: GC,AI

## 2024-04-10 PROCEDURE — 99291 CRITICAL CARE FIRST HOUR: CPT | Mod: FS

## 2024-04-10 PROCEDURE — 74176 CT ABD & PELVIS W/O CONTRAST: CPT | Mod: 26,MC

## 2024-04-10 PROCEDURE — 99497 ADVNCD CARE PLAN 30 MIN: CPT | Mod: 25,GC

## 2024-04-10 PROCEDURE — 71045 X-RAY EXAM CHEST 1 VIEW: CPT | Mod: 26

## 2024-04-10 RX ORDER — LACTULOSE 10 G/15ML
10 SOLUTION ORAL THREE TIMES A DAY
Refills: 0 | Status: DISCONTINUED | OUTPATIENT
Start: 2024-04-10 | End: 2024-04-14

## 2024-04-10 RX ORDER — DEXTROSE 50 % IN WATER 50 %
25 SYRINGE (ML) INTRAVENOUS ONCE
Refills: 0 | Status: COMPLETED | OUTPATIENT
Start: 2024-04-10 | End: 2024-04-10

## 2024-04-10 RX ORDER — SODIUM CHLORIDE 9 MG/ML
1000 INJECTION, SOLUTION INTRAVENOUS
Refills: 0 | Status: DISCONTINUED | OUTPATIENT
Start: 2024-04-10 | End: 2024-04-11

## 2024-04-10 RX ORDER — SPIRONOLACTONE 25 MG/1
1 TABLET, FILM COATED ORAL
Refills: 0 | DISCHARGE

## 2024-04-10 RX ORDER — LIDOCAINE 4 G/100G
1 CREAM TOPICAL DAILY
Refills: 0 | Status: DISCONTINUED | OUTPATIENT
Start: 2024-04-10 | End: 2024-04-15

## 2024-04-10 RX ORDER — SODIUM CHLORIDE 9 MG/ML
1000 INJECTION INTRAMUSCULAR; INTRAVENOUS; SUBCUTANEOUS ONCE
Refills: 0 | Status: COMPLETED | OUTPATIENT
Start: 2024-04-10 | End: 2024-04-10

## 2024-04-10 RX ORDER — OCTREOTIDE ACETATE 200 UG/ML
5 INJECTION, SOLUTION INTRAVENOUS; SUBCUTANEOUS
Qty: 500 | Refills: 0 | Status: DISCONTINUED | OUTPATIENT
Start: 2024-04-10 | End: 2024-04-10

## 2024-04-10 RX ORDER — OCTREOTIDE ACETATE 200 UG/ML
50 INJECTION, SOLUTION INTRAVENOUS; SUBCUTANEOUS
Qty: 500 | Refills: 0 | Status: DISCONTINUED | OUTPATIENT
Start: 2024-04-10 | End: 2024-04-15

## 2024-04-10 RX ORDER — OCTREOTIDE ACETATE 200 UG/ML
100 INJECTION, SOLUTION INTRAVENOUS; SUBCUTANEOUS THREE TIMES A DAY
Refills: 0 | Status: DISCONTINUED | OUTPATIENT
Start: 2024-04-10 | End: 2024-04-10

## 2024-04-10 RX ORDER — CARVEDILOL PHOSPHATE 80 MG/1
1 CAPSULE, EXTENDED RELEASE ORAL
Refills: 0 | DISCHARGE

## 2024-04-10 RX ORDER — INFLUENZA VIRUS VACCINE 15; 15; 15; 15 UG/.5ML; UG/.5ML; UG/.5ML; UG/.5ML
0.7 SUSPENSION INTRAMUSCULAR ONCE
Refills: 0 | Status: DISCONTINUED | OUTPATIENT
Start: 2024-04-10 | End: 2024-04-15

## 2024-04-10 RX ORDER — OCTREOTIDE ACETATE 200 UG/ML
25 INJECTION, SOLUTION INTRAVENOUS; SUBCUTANEOUS
Qty: 500 | Refills: 0 | Status: DISCONTINUED | OUTPATIENT
Start: 2024-04-10 | End: 2024-04-10

## 2024-04-10 RX ORDER — ALBUMIN HUMAN 25 %
100 VIAL (ML) INTRAVENOUS EVERY 8 HOURS
Refills: 0 | Status: DISCONTINUED | OUTPATIENT
Start: 2024-04-10 | End: 2024-04-12

## 2024-04-10 RX ORDER — PANTOPRAZOLE SODIUM 20 MG/1
40 TABLET, DELAYED RELEASE ORAL
Refills: 0 | Status: DISCONTINUED | OUTPATIENT
Start: 2024-04-11 | End: 2024-04-15

## 2024-04-10 RX ORDER — SODIUM CHLORIDE 9 MG/ML
2000 INJECTION INTRAMUSCULAR; INTRAVENOUS; SUBCUTANEOUS ONCE
Refills: 0 | Status: COMPLETED | OUTPATIENT
Start: 2024-04-10 | End: 2024-04-10

## 2024-04-10 RX ORDER — PANTOPRAZOLE SODIUM 20 MG/1
40 TABLET, DELAYED RELEASE ORAL ONCE
Refills: 0 | Status: COMPLETED | OUTPATIENT
Start: 2024-04-10 | End: 2024-04-10

## 2024-04-10 RX ORDER — HEPARIN SODIUM 5000 [USP'U]/ML
5000 INJECTION INTRAVENOUS; SUBCUTANEOUS EVERY 8 HOURS
Refills: 0 | Status: DISCONTINUED | OUTPATIENT
Start: 2024-04-10 | End: 2024-04-12

## 2024-04-10 RX ORDER — ALBUMIN HUMAN 25 %
50 VIAL (ML) INTRAVENOUS ONCE
Refills: 0 | Status: COMPLETED | OUTPATIENT
Start: 2024-04-10 | End: 2024-04-10

## 2024-04-10 RX ORDER — FUROSEMIDE 40 MG
1 TABLET ORAL
Refills: 0 | DISCHARGE

## 2024-04-10 RX ORDER — SODIUM CHLORIDE 9 MG/ML
1000 INJECTION, SOLUTION INTRAVENOUS
Refills: 0 | Status: DISCONTINUED | OUTPATIENT
Start: 2024-04-10 | End: 2024-04-10

## 2024-04-10 RX ORDER — GABAPENTIN 400 MG/1
0 CAPSULE ORAL
Refills: 0 | DISCHARGE

## 2024-04-10 RX ORDER — MIDODRINE HYDROCHLORIDE 2.5 MG/1
10 TABLET ORAL EVERY 8 HOURS
Refills: 0 | Status: DISCONTINUED | OUTPATIENT
Start: 2024-04-10 | End: 2024-04-10

## 2024-04-10 RX ADMIN — Medication 50 MILLILITER(S): at 17:47

## 2024-04-10 RX ADMIN — SODIUM CHLORIDE 2000 MILLILITER(S): 9 INJECTION INTRAMUSCULAR; INTRAVENOUS; SUBCUTANEOUS at 15:00

## 2024-04-10 RX ADMIN — PANTOPRAZOLE SODIUM 40 MILLIGRAM(S): 20 TABLET, DELAYED RELEASE ORAL at 19:20

## 2024-04-10 RX ADMIN — SODIUM CHLORIDE 50 MILLILITER(S): 9 INJECTION, SOLUTION INTRAVENOUS at 19:19

## 2024-04-10 RX ADMIN — Medication 25 MILLILITER(S): at 09:15

## 2024-04-10 RX ADMIN — SODIUM CHLORIDE 2000 MILLILITER(S): 9 INJECTION INTRAMUSCULAR; INTRAVENOUS; SUBCUTANEOUS at 09:15

## 2024-04-10 RX ADMIN — Medication 100 MILLILITER(S): at 21:12

## 2024-04-10 RX ADMIN — HEPARIN SODIUM 5000 UNIT(S): 5000 INJECTION INTRAVENOUS; SUBCUTANEOUS at 21:09

## 2024-04-10 RX ADMIN — LACTULOSE 10 GRAM(S): 10 SOLUTION ORAL at 21:08

## 2024-04-10 RX ADMIN — SODIUM CHLORIDE 1000 MILLILITER(S): 9 INJECTION INTRAMUSCULAR; INTRAVENOUS; SUBCUTANEOUS at 15:04

## 2024-04-10 RX ADMIN — SODIUM CHLORIDE 1000 MILLILITER(S): 9 INJECTION INTRAMUSCULAR; INTRAVENOUS; SUBCUTANEOUS at 16:20

## 2024-04-10 RX ADMIN — Medication 25 MILLILITER(S): at 17:57

## 2024-04-10 NOTE — ED PROVIDER NOTE - PROGRESS NOTE DETAILS
CELENA Smallwood:Spoke with radiology in regards to CT read showing cirrhosis with portal hypertension, also showing large right hepatic lobe heterogeneous hypoattenuating lesion consistent with history of hepatocellular carcinoma.  Shows dilated main portal vein with a differential including tumor thrombosis.  Discussed with patient and family, patient's family at bedside states that patient had an MRI that showed the near portal vein she had tumor and not thrombosis and she was not continued on anticoagulation at time of discharge from recent outside hospital.  Spoke with hospitalist who accepts admission.  Patient and family aware of admission. Recent labwork on 4/04 with normal creatinine, likely pre-renal MIGUELANGEL due to decreased PO intake

## 2024-04-10 NOTE — H&P ADULT - PROBLEM SELECTOR PLAN 1
- Dx in 3/2024, in OSH, no Bx performed, no Tx offered, sent home on Hospice   -  ALT 97 Tbili 34.9 on admission  Family would like second opinion on treatment options by Oncology team     Plan:   Consult [facility] for 2nd opinion   Consult Hepatology   - F/u Direct indirect Bili   - Trend AST/ALT   - C/w Lactulose  - Hold home Spironolactone and Lasix iso hypotension - Dx in 3/2024, in OSH, no Bx performed, no Tx offered, sent home on Hospice   -  ALT 97 Tbili 34.9 on admission  Family would like second opinion on treatment options by Oncology team     Plan:   Consult [facility] for 2nd opinion   Consult Hepatology  Palliative Consult    - F/u Direct indirect Bili   - Trend AST/ALT   - Lactulose 10 BID titrate to 3-4 BM  - Hold home Spironolactone and Lasix iso hypotension

## 2024-04-10 NOTE — ED ADULT NURSE REASSESSMENT NOTE - NS ED NURSE REASSESS COMMENT FT1
Wolf RN: Rapid response called by primary RN Triston. Pt noted by primary RN increase in lethargy. Pt hypotensive 60's/50's. pt given a liter of NS and is responsive. Pt found to be hypoglycemic (64) with heart rate that drops to the 40's on assessment. pt given dextrose as ordered. pt respirations  even and unlabored. pt remains sinus bradycardic on the tele monitor. rapid response team at bedside. Plan of care ongoing.

## 2024-04-10 NOTE — ED PROVIDER NOTE - CLINICAL SUMMARY MEDICAL DECISION MAKING FREE TEXT BOX
74-year-old female with past medical history of recently diagnosed hepatic carcinoma, DM2, HTN, HLD, history of hepatitis C s/p treatment C/B cirrhosis presenting to the ER with weakness, chest tightness and shortness of breath.  Family at bedside also providing history, states patient was diagnosed with cancer approximately 1 month ago at outside hospital, discharged to home hospice approximately 10 days ago.  States over the past few days patient did not like eating normal diet has been having generalized weakness and fatigue.  Patient reports last week she had paracentesis performed for 2.5 L were removed.    States this morning patient developed chest tightness and shortness of breath.  Reports abdominal discomfort at her baseline. On exam pt appears chronically ill, hypotensive, HR 49, temp 95.7 rectally, sclera icterus, non tender distended abdomen, lungs clear to ausculation. Concern for poor PO intake, advancing disease, acs, no vomiting to suggest obstruction. Plan: cbc/cmp, trop, tsh, vbg, blood cultures, urine ua/ucx, CXR, CT abd/pelvis. 74-year-old female with past medical history of recently diagnosed hepatic carcinoma, DM2, HTN, HLD, history of hepatitis C s/p treatment C/B cirrhosis presenting to the ER with weakness, chest tightness and shortness of breath.  Family at bedside also providing history, states patient was diagnosed with cancer approximately 1 month ago at outside hospital, discharged to home hospice approximately 10 days ago.  States over the past few days patient did not like eating normal diet has been having generalized weakness and fatigue.  Patient reports last week she had paracentesis performed for 2.5 L were removed.    States this morning patient developed chest tightness and shortness of breath.  Reports abdominal discomfort at her baseline. On exam pt appears chronically ill, hypotensive, HR 49, temp 95.7 rectally, sclera icterus, non tender distended abdomen, lungs clear to ausculation. Concern for poor PO intake, advancing disease, acs, no vomiting to suggest obstruction. Plan: cbc/cmp, trop, tsh, vbg, blood cultures, urine ua/ucx, CXR, CT abd/pelvis, IV fluids 2L bolus for hypotension, jailene hugger for low temp

## 2024-04-10 NOTE — H&P ADULT - PROBLEM SELECTOR PLAN 6
DVT PPx: Heparin SubQ   Gi PPx: Pantoprazole  Diet: pescatarian diet   PT/OT: Hold off until hemodynamically stable  Code Status: Full code   Dispo: Pending

## 2024-04-10 NOTE — H&P ADULT - ATTENDING COMMENTS
Patient seen and examined in Ed with team and family at bedside  Agree with above A/P by team  Utilized  phone # [phone]  74-year-old female with past medical history of recently diagnosed hepatic carcinoma, DM2, HTN, HLD, history of hepatitis C s/p treatment C/B cirrhosis presenting to the ER with weakness, chest tightness and shortness of breath. The patient initially was in [facility] and went to the hospital due to  increasing ascites and dyspnea. 10L fluids were removed. States long-standing hx of Hep C and complicated by cirrhosis. PAtient followed up with hepatology at [facility] and was sent directly to ED given clinical symptoms. The patient was admitted to [facility] for about  a month  and was diagnosed with HCC at that time. No Bx was done at the time. No treatment was offered by oncology team at [facility] and was managed palliatively and discharged with home hospice. Over past few days the patient developed reduced appetite, generalized fatigue. Last week she had paracentesis which removed 2.5L ascitic fluid. This AM she developed chest tightness and shortness of breath.  Reports abdominal discomfort at her baseline.  Denies fever/chills, palpitations, syncope, vomiting, diarrhea, black or bloody stool or any other concerns.     AT [facility] ED the patient vitals were significant for BP of 83/47, pulse in 50s, saturating 99% RA.   Utilized  phone # [phone], Patient also speaks english    PE, Very pleasant lady and family   Vital Signs Last 24 Hrs  T(F): 97.8 HR: 57 , BP: 96/52 , RR: 17 , SpO2: 100% room air  HEENT, very jaundiced eyes  Lungs CTA anteriorly, COR  randee RRR, Abd distended but soft,Ext neg e/c/c                          12.5   9.28  )-----------( 174      ( 10 Apr 2024 09:23 )             35.5   04-10    125<L>  |  87<L>  |  54<H>  ----------------------------<  35<LL>  4.5   |  20<L>  |  2.43<H>    Ca    8.5      10 Apr 2024 09:23  Phos  5.1     04-10  Mg     2.50     04-10    TPro  5.7<L>  /  Alb  2.5<L>  /  TBili  34.9<H>  /  DBili  x   /  AST  241<H>  /  ALT  97<H>  /  AlkPhos  494<H>  04-10    rad< from: CT Abdomen and Pelvis No Cont (04.10.24 @ 13:43) >    IMPRESSION:  Cirrhosis with portal hypertension.  Large right hepaticlobe heterogeneous hypoattenuating lesion measuring   1.1 x 7.7 cm, consistent with history of hepatocellular carcinoma.  Dilated main portal vein measuring up to 3.1 cm. Differentials include   bland versus tumor thrombosis. Addendum can be issued with comparison   with prior studies if available.    A/P  Hepatocellular Ca with hyperbilirubinemia w/o evidence of obstructon on Ct, hypoglycemia sec to Liver abn and poor po intake, hyponatremia, FFT  # Onc , Family wanrs second opnion. [facility] Onc team consult requested  # Hyponatremia, multifactorial . will do trial of D5NS at 50 cc/hr and f/u Na, Glu and creat in 6 hrs.  Do not correct faster than 8 meq in 24 hrs.  Start diet with salt , veg and fish  # Epigastric pain with eating. PPI, keep HOB>30 deg  # Dvt proph hep sq  #GOC  HCP form complet Son Neptali Stewart [phone], #2 is daughter Andrews Stewart [phone]  Patient is FULL CODE. Patient seen and examined in Ed with team and family at bedside  Agree with above A/P by team  Utilized  phone # [phone]  74-year-old female with past medical history of recently diagnosed hepatic carcinoma, DM2, HTN, HLD, history of hepatitis C s/p treatment C/B cirrhosis presenting to the ER with weakness, chest tightness and shortness of breath. The patient initially was in [facility] and went to the hospital due to  increasing ascites and dyspnea. 10L fluids were removed. States long-standing hx of Hep C and complicated by cirrhosis. PAtient followed up with hepatology at [facility] and was sent directly to ED given clinical symptoms. The patient was admitted to [facility] for about  a month  and was diagnosed with HCC at that time. No Bx was done at the time. No treatment was offered by oncology team at [facility] and was managed palliatively and discharged with home hospice. Over past few days the patient developed reduced appetite, generalized fatigue. Last week she had paracentesis which removed 2.5L ascitic fluid. This AM she developed chest tightness and shortness of breath.  Reports abdominal discomfort at her baseline.  Denies fever/chills, palpitations, syncope, vomiting, diarrhea, black or bloody stool or any other concerns.     AT [facility] ED the patient vitals were significant for BP of 83/47, pulse in 50s, saturating 99% RA.   Utilized  phone # [phone], Patient also speaks english    PE, Very pleasant lady and family   Vital Signs Last 24 Hrs  T(F): 97.8 HR: 57 , BP: 96/52 , RR: 17 , SpO2: 100% room air  HEENT, very jaundiced eyes  Lungs CTA anteriorly, COR  randee RRR, Abd distended but soft,Ext neg e/c/c                          12.5   9.28  )-----------( 174      ( 10 Apr 2024 09:23 )             35.5   04-10    125<L>  |  87<L>  |  54<H>  ----------------------------<  35<LL>  4.5   |  20<L>  |  2.43<H>    Ca    8.5      10 Apr 2024 09:23  Phos  5.1     04-10  Mg     2.50     04-10    TPro  5.7<L>  /  Alb  2.5<L>  /  TBili  34.9<H>  /  DBili  x   /  AST  241<H>  /  ALT  97<H>  /  AlkPhos  494<H>  04-10    rad< from: CT Abdomen and Pelvis No Cont (04.10.24 @ 13:43) >    IMPRESSION:  Cirrhosis with portal hypertension.  Large right hepaticlobe heterogeneous hypoattenuating lesion measuring   1.1 x 7.7 cm, consistent with history of hepatocellular carcinoma.  Dilated main portal vein measuring up to 3.1 cm. Differentials include   bland versus tumor thrombosis. Addendum can be issued with comparison   with prior studies if available.    A/P  Hepatocellular Ca with hyperbilirubinemia w/o evidence of obstructon on Ct, hypoglycemia sec to Liver abn and poor po intake, hyponatremia, FFT  # Onc , Family wanrs second opnion. [facility] Onc team consult requested  # Hyponatremia, multifactorial . will do trial of D5NS at 50 cc/hr and f/u Na, Glu and creat in 6 hrs.  Do not correct faster than 8 meq in 24 hrs.  Start diet with salt , veg and fish  # Hyperbili and inc LFT---> GI/ Hepatology eval.  # Hypotension. IVF, midodrine.  # Epigastric pain with eating. PPI, keep HOB>30 deg  # Dvt proph hep sq  #GOC  HCP form complet Son Neptali Stewart [phone], #2 is daughter Andrews Stewart [phone]  Patient is FULL CODE. Patient seen and examined in Ed with team and family at bedside  Agree with above A/P by team  Utilized  phone # [phone]  74-year-old female with past medical history of recently diagnosed hepatic carcinoma, DM2, HTN, HLD, history of hepatitis C s/p treatment C/B cirrhosis presenting to the ER with weakness, chest tightness and shortness of breath. The patient initially was in [facility] and went to the hospital due to  increasing ascites and dyspnea. 10L fluids were removed. States long-standing hx of Hep C and complicated by cirrhosis. PAtient followed up with hepatology at [facility] and was sent directly to ED given clinical symptoms. The patient was admitted to [facility] for about  a month  and was diagnosed with HCC at that time. No Bx was done at the time. No treatment was offered by oncology team at [facility] and was managed palliatively and discharged with home hospice. Over past few days the patient developed reduced appetite, generalized fatigue. Last week she had paracentesis which removed 2.5L ascitic fluid. This AM she developed chest tightness and shortness of breath.  Reports abdominal discomfort at her baseline.  Denies fever/chills, palpitations, syncope, vomiting, diarrhea, black or bloody stool or any other concerns.     AT [facility] ED the patient vitals were significant for BP of 83/47, pulse in 50s, saturating 99% RA.   Utilized  phone # [phone], Patient also speaks english    PE, Very pleasant lady and family   Vital Signs Last 24 Hrs  T(F): 97.8 HR: 57 , BP: 96/52 , RR: 17 , SpO2: 100% room air  HEENT, very jaundiced eyes  Lungs CTA anteriorly, COR  randee RRR, Abd distended but soft,Ext neg e/c/c                          12.5   9.28  )-----------( 174      ( 10 Apr 2024 09:23 )             35.5   04-10    125<L>  |  87<L>  |  54<H>  ----------------------------<  35<LL>  4.5   |  20<L>  |  2.43<H>    Ca    8.5      10 Apr 2024 09:23  Phos  5.1     04-10  Mg     2.50     04-10    TPro  5.7<L>  /  Alb  2.5<L>  /  TBili  34.9<H>  /  DBili  x   /  AST  241<H>  /  ALT  97<H>  /  AlkPhos  494<H>  04-10    rad< from: CT Abdomen and Pelvis No Cont (04.10.24 @ 13:43) >    IMPRESSION:  Cirrhosis with portal hypertension.  Large right hepaticlobe heterogeneous hypoattenuating lesion measuring   1.1 x 7.7 cm, consistent with history of hepatocellular carcinoma.  Dilated main portal vein measuring up to 3.1 cm. Differentials include   bland versus tumor thrombosis. Addendum can be issued with comparison   with prior studies if available.    A/P  Hepatocellular Ca with hyperbilirubinemia w/o evidence of obstructon on Ct, hypoglycemia sec to Liver abn and poor po intake, hyponatremia, FFT  # Onc , Family wanrs second opnion. [facility] Onc team consult requested  # Hyponatremia, multifactorial . will do trial of D5NS at 50 cc/hr and f/u Na, Glu and creat in 6 hrs.  Do not correct faster than 8 meq in 24 hrs.  Start diet with salt , veg and fish  # Hyperbili and inc LFT---> GI/ Hepatology eval.  # Hypotension. IVF, midodrine.  # Epigastric pain with eating. PPI, keep HOB>30 deg  # Dvt proph hep sq  #GOC  HCP form complet Son Neptali Stewart [phone], #2 is daughter Andrews Stewart [phone]  Patient is FULL CODE..

## 2024-04-10 NOTE — ED PROVIDER NOTE - OBJECTIVE STATEMENT
74-year-old female with past medical history of recently diagnosed hepatic carcinoma, DM2, HTN, HLD, history of hepatitis C s/p treatment C/B cirrhosis presenting to the ER with weakness, chest tightness and shortness of breath.  Family at bedside also providing history, states patient was diagnosed with cancer approximately 1 month ago at outside hospital, discharged to home hospice approximately 10 days ago.  States over the past few days patient did not like eating normal diet has been having generalized weakness and fatigue.  Patient reports last week she had paracentesis performed for 2-1/2 L were removed.  Patient saw her PMD yesterday.  States this morning patient developed chest tightness and shortness of breath.  Reports abdominal discomfort at her baseline.  Denies fever/chills, palpitations, syncope, vomiting, diarrhea, black or bloody stool or any other concerns.  Presents 74-year-old female with past medical history of recently diagnosed hepatic carcinoma, DM2, HTN, HLD, history of hepatitis C s/p treatment C/B cirrhosis presenting to the ER with weakness, chest tightness and shortness of breath.  Family at bedside also providing history, states patient was diagnosed with cancer approximately 1 month ago at outside hospital, discharged to home hospice approximately 10 days ago.  States over the past few days patient did not like eating normal diet has been having generalized weakness and fatigue.  Patient reports last week she had paracentesis performed for 2-1/2 L were removed.  Patient saw her PMD yesterday.  States this morning patient developed chest tightness and shortness of breath.  Reports abdominal discomfort at her baseline.  Denies fever/chills, palpitations, syncope, vomiting, diarrhea, black or bloody stool or any other concerns. 74-year-old female with past medical history of recently diagnosed hepatic carcinoma, DM2, HTN, HLD, history of hepatitis C s/p treatment C/B cirrhosis presenting to the ER with weakness, chest tightness and shortness of breath.  Family at bedside also providing history, states patient was diagnosed with cancer approximately 1 month ago at outside hospital, discharged to home hospice approximately 10 days ago.  States over the past few days patient did not like eating normal diet has been having generalized weakness and fatigue.  Patient reports last week she had paracentesis performed for 2.5 L were removed.  Patient saw her PMD yesterday.  States this morning patient developed chest tightness and shortness of breath.  Reports abdominal discomfort at her baseline.  Denies fever/chills, palpitations, syncope, vomiting, diarrhea, black or bloody stool or any other concerns.

## 2024-04-10 NOTE — H&P ADULT - PROBLEM SELECTOR PLAN 2
- Hyponatremic to 125 w/ Urine sodium <20; likely iso very poor PO intake     Plan:  D5NS at 50cc/hr   BMP Q6, avoid overcorrection Na >8 in 24h - Hyponatremic to 125 w/ Urine sodium <20; likely iso very poor PO intake   - Red < 20, Uosm >330; Patient appears floridly overloaded; likely iso Cirrhosis  - Ideally would fluid restrict patient, however given patient's poor appetite, would start D10 NS    Plan:  D10NS at 50cc/hr   BMP Q6, avoid overcorrection Na >10 in 24h

## 2024-04-10 NOTE — H&P ADULT - PROBLEM SELECTOR PLAN 4
hypoglycemic to 50s on admission likely iso liver failure     - D5NS   - Diet started   - CTM finger sticks w/ meals hypoglycemic to 50s on admission likely iso liver failure     - D10NS   - Diet started   - CTM finger sticks w/ meals

## 2024-04-10 NOTE — H&P ADULT - CONVERSATION DETAILS
Discussed with family regarding patient's poor prognosis and likely futility of aggressive treatment measures. Discussed that aggressive treatment should be a bridge to allow for future treatment and recovery and informed family that she was extremely unlikely to qualify for any treatment measures and that further aggressive measures may only bring more suffering for minimal gain. Family reports because patient herself wished to pursue aggressive measures, they want to respect her wishes.

## 2024-04-10 NOTE — H&P ADULT - ASSESSMENT
74y F hx of HCC 2/2 chronic Hep C s/p treatment, T2DM, HTN, HLD, here for evaluation and management of SOB and Weakness.  74F w/ HCC 2/2 chronic Hep C s/p harvoni 2015, decomensated cirrhosis (ascites), CAD (stress test), T2DM, HTN, HLD, with recent admission to [facility] for worsening jaundice, found to have infiltrative HCC w/ extensive tumor thrombus initially discharged to home hospice presents to [facility] with new onset chest tightness as well as 1-2 weeks of worsening lethargy/failure to thrive.

## 2024-04-10 NOTE — PATIENT PROFILE ADULT - FALL HARM RISK - HARM RISK INTERVENTIONS
Assistance with ambulation/Assistance OOB with selected safe patient handling equipment/Communicate Risk of Fall with Harm to all staff/Monitor gait and stability/Reinforce activity limits and safety measures with patient and family/Review medications for side effects contributing to fall risk/Sit up slowly, dangle for a short time, stand at bedside before walking/Tailored Fall Risk Interventions/Toileting schedule using arm’s reach rule for commode and bathroom/Use of alarms - bed, chair and/or voice tab/Visual Cue: Yellow wristband and red socks/Bed in lowest position, wheels locked, appropriate side rails in place/Call bell, personal items and telephone in reach/Instruct patient to call for assistance before getting out of bed or chair/Non-slip footwear when patient is out of bed/[place] to call system/Physically safe environment - no spills, clutter or unnecessary equipment/Purposeful Proactive Rounding/Room/bathroom lighting operational, light cord in reach

## 2024-04-10 NOTE — ED ADULT NURSE NOTE - OBJECTIVE STATEMENT
pt received to room 9 as an un-notified note. pt is AxO 3. pt c/o chest tightness, and decreased appetite/weakness x 2 days. pt family st bedside to translate. pt hx of GN type 2, liver cancer, HTN, esophagal varices, Hepatitis C, and cirrhosis. Pt found to be hypoglycemic and hypotensive. pt medicated a prescribed in EMR. pt has bilateral 20G to the AC's. pt labs obtained and sent tot he lab. pt respirations are even and unlabored on room air. pt is sinus bradycardic on the tele monitor. pt family endorses pt receiving drainage of her ascites on Monday at [facility]. Family states since then her symptoms developed. Jaundice noted to pt skin and eyes. pt Abdomen is soft, nontender, and nondistended. pt hypothermic with rectal temperature. bear hugger replaced on pt. EKG obtained at bedside. pt responsive to treatment at bedside. Awaiting further orders. plan of care ongoing. pt received to room 9 as an un-notified note. pt is AxO 3. pt c/o chest tightness, and decreased appetite/weakness x 2 days. pt family st bedside to translate. pt hx of GN type 2, liver cancer, HTN, esophagal varices, Hepatitis C, and cirrhosis. Pt found to be hypoglycemic and hypotensive. pt medicated a prescribed in EMR. pt has bilateral 20G to the AC's. pt labs obtained and sent tot he lab. pt respirations are even and unlabored on room air. pt is sinus bradycardic on the tele monitor. pt family endorses pt receiving drainage of her ascites on Monday at [facility]. Family states since then her symptoms developed. Jaundice noted to pt skin and eyes. pt Abdomen is soft, nontender, and nondistended. pt hypothermic with rectal temperature. bear hugger replaced on pt. EKG obtained at bedside. pt responsive to treatment at bedside. Awaiting further orders. plan of care ongoing.  12:10-Assumed care of pt from RN, found laying in bed, jaundiced, denies pain at this time, family is at bedside, awaiting in pt bed. A

## 2024-04-10 NOTE — H&P ADULT - NSHPPHYSICALEXAM_GEN_ALL_CORE
General: Patient in NAD  HEENT: NCAT, EOMI, no oral lesions, Scleral Iceterus  CV: S1+S2, no m/r/g appreciated   Lungs: No respiratory distress, CTAB  Abd: Soft, nontender to palpation, distended w/o mass, no guarding or rebound tenderness  : No suprapubic tenderness  Neuro: Alert and oriented to time, place and person. No focal deficits noted.   Ext: No cyanosis, no edema  Skin: No rash, no phlebitis, Jaundiced

## 2024-04-10 NOTE — H&P ADULT - PROBLEM SELECTOR PLAN 3
- Hypotensive to MAP 50s on admission s/p 3L Bolus and Albumin   - HR persistently low in 50s, not feasible to start midodrine     Plan:   - Albumin for hypotension, limit fluid boluses iso decompensated cirrhosis, Avoid midodrine 2/2 bradycardia  - Hole Lasix and Spironolactone  - Low threshold for micu consult

## 2024-04-10 NOTE — ED ADULT NURSE NOTE - CHIEF COMPLAINT QUOTE
SOB, chest tightness, and generalized weakness x 2 days, noted to be bradycardic and hypotensive, recently diagnosed with liver cancer, hx of T2DM, HTN, esophagal varices, BGL 47, brought directly to room 9

## 2024-04-10 NOTE — ED ADULT NURSE NOTE - NSFALLUNIVINTERV_ED_ALL_ED
Bed/Stretcher in lowest position, wheels locked, appropriate side rails in place/Call bell, personal items and telephone in reach/Instruct patient to call for assistance before getting out of bed/chair/stretcher/Non-slip footwear applied when patient is off stretcher/[place] to call system/Physically safe environment - no spills, clutter or unnecessary equipment/Purposeful proactive rounding/Room/bathroom lighting operational, light cord in reach

## 2024-04-10 NOTE — GOALS OF CARE CONVERSATION - ADVANCED CARE PLANNING - CONVERSATION DETAILS
Patient seen and examined in Ed with team and family at bedside  Agree with above A/P by team  Utilized  phone # [phone]  74-year-old female with past medical history of recently diagnosed hepatic carcinoma, DM2, HTN, HLD, history of hepatitis C s/p treatment C/B cirrhosis presenting to the ER with weakness, chest tightness and shortness of breath. The patient initially was in [facility] and went to the hospital due to  increasing ascites and dyspnea. 10L fluids were removed. States long-standing hx of Hep C and complicated by cirrhosis. PAtient followed up with hepatology at [facility] and was sent directly to ED given clinical symptoms. The patient was admitted to [facility] for about  a month  and was diagnosed with HCC at that time. No Bx was done at the time. No treatment was offered by oncology team at [facility] and was managed palliatively and discharged with home hospice. Over past few days the patient developed reduced appetite, generalized fatigue. Last week she had paracentesis which removed 2.5L ascitic fluid. This AM she developed chest tightness and shortness of breath.  Reports abdominal discomfort at her baseline.  Denies fever/chills, palpitations, syncope, vomiting, diarrhea, black or bloody stool or any other concerns.     AT [facility] ED the patient vitals were significant for BP of 83/47, pulse in 50s, saturating 99% RA.   Utilized  phone # [phone], Patient also speaks english    PE, Very pleasant lady and family   Vital Signs Last 24 Hrs  T(F): 97.8 HR: 57 , BP: 96/52 , RR: 17 , SpO2: 100% room air  HEENT, very jaundiced eyes  Lungs CTA anteriorly, COR  randee RRR, Abd distended but soft,Ext neg e/c/c                          12.5   9.28  )-----------( 174      ( 10 Apr 2024 09:23 )             35.5   04-10    125<L>  |  87<L>  |  54<H>  ----------------------------<  35<LL>  4.5   |  20<L>  |  2.43<H>    Ca    8.5      10 Apr 2024 09:23  Phos  5.1     04-10  Mg     2.50     04-10    TPro  5.7<L>  /  Alb  2.5<L>  /  TBili  34.9<H>  /  DBili  x   /  AST  241<H>  /  ALT  97<H>  /  AlkPhos  494<H>  04-10    rad< from: CT Abdomen and Pelvis No Cont (04.10.24 @ 13:43) >    IMPRESSION:  Cirrhosis with portal hypertension.  Large right hepaticlobe heterogeneous hypoattenuating lesion measuring   1.1 x 7.7 cm, consistent with history of hepatocellular carcinoma.  Dilated main portal vein measuring up to 3.1 cm. Differentials include   bland versus tumor thrombosis. Addendum can be issued with comparison   with prior studies if available.    A/P  Hepatocellular Ca with hyperbilirubinemia w/o evidence of obstructon on Ct, hypoglycemia sec to Liver abn and poor po intake, hyponatremia, FFT  # Onc , Family wanrs second opnion. [facility] Onc team consult requested  # Hyponatremia, multifactorial . will do trial of D5NS at 50 cc/hr and f/u Na, Glu and creat in 6 hrs.  Do not correct faster than 8 meq in 24 hrs.  Start diet with salt , veg and fish  # Epigastric pain with eating. PPI, keep HOB>30 deg  # Dvt proph hep sq  #GOC  HCP form complet Son Neptali Stewart [phone], #2 is daughter Andrews Stewart [phone]  Patient is FULL CODE. Patient seen and examined in Ed with team and family at bedside  Agree with above A/P by team  Utilized  phone # [phone]  74-year-old female with past medical history of recently diagnosed hepatic carcinoma, DM2, HTN, HLD, history of hepatitis C s/p treatment C/B cirrhosis presenting to the ER with weakness, chest tightness and shortness of breath. The patient initially was in [facility] and went to the hospital due to  increasing ascites and dyspnea. 10L fluids were removed. States long-standing hx of Hep C and complicated by cirrhosis. PAtient followed up with hepatology at [facility] and was sent directly to ED given clinical symptoms. The patient was admitted to [facility] for about  a month  and was diagnosed with HCC at that time. No Bx was done at the time. No treatment was offered by oncology team at [facility] and was managed palliatively and discharged with home hospice. Over past few days the patient developed reduced appetite, generalized fatigue. Last week she had paracentesis which removed 2.5L ascitic fluid. This AM she developed chest tightness and shortness of breath.  Reports abdominal discomfort at her baseline.  Denies fever/chills, palpitations, syncope, vomiting, diarrhea, black or bloody stool or any other concerns.     AT [facility] ED the patient vitals were significant for BP of 83/47, pulse in 50s, saturating 99% RA.   Utilized  phone # [phone], Patient also speaks english    PE, Very pleasant lady and family   Vital Signs Last 24 Hrs  T(F): 97.8 HR: 57 , BP: 96/52 , RR: 17 , SpO2: 100% room air  HEENT, very jaundiced eyes  Lungs CTA anteriorly, COR  randee RRR, Abd distended but soft,Ext neg e/c/c                          12.5   9.28  )-----------( 174      ( 10 Apr 2024 09:23 )             35.5   04-10    125<L>  |  87<L>  |  54<H>  ----------------------------<  35<LL>  4.5   |  20<L>  |  2.43<H>    Ca    8.5      10 Apr 2024 09:23  Phos  5.1     04-10  Mg     2.50     04-10    TPro  5.7<L>  /  Alb  2.5<L>  /  TBili  34.9<H>  /  DBili  x   /  AST  241<H>  /  ALT  97<H>  /  AlkPhos  494<H>  04-10    rad< from: CT Abdomen and Pelvis No Cont (04.10.24 @ 13:43) >    IMPRESSION:  Cirrhosis with portal hypertension.  Large right hepaticlobe heterogeneous hypoattenuating lesion measuring   1.1 x 7.7 cm, consistent with history of hepatocellular carcinoma.  Dilated main portal vein measuring up to 3.1 cm. Differentials include   bland versus tumor thrombosis. Addendum can be issued with comparison   with prior studies if available.    A/P  Hepatocellular Ca with hyperbilirubinemia w/o evidence of obstructon on Ct, hypoglycemia sec to Liver abn and poor po intake, hyponatremia, FFT  # Onc , Family wanrs second opnion. [facility] Onc team consult requested  # Hyponatremia, multifactorial . will do trial of D5NS at 50 cc/hr and f/u Na, Glu and creat in 6 hrs.  Do not correct faster than 8 meq in 24 hrs.  Start diet with salt , veg and fish  # Epigastric pain with eating. PPI, keep HOB>30 deg  # Dvt proph hep sq  #GOC  HCP form complet Son Neptali Stewart [phone], #2 is daughter Andrews Stewart [phone]  Patient is FULL CODE.    Addendum  Patient had Rapid Response called in ED for low BP  IVF give, IVf Albumen  D/w Son Neptali and daughter Andrews that base of the labs and Ct finding, patient has cancer of liver with failing liver and cannot get chromotherapy of treatment with elevated bili. Will ask Onc to see but most likely we will be back to hospice at home.  Son wants her to have iVf and glucose on home hospice.   Explained that will only prolong her suffering , not help.  Family wants patient optimized to see Oncologist  in [place] that promised IV Vitamin C and "holistic treatment "  Son stated " we have nothing to loose"

## 2024-04-10 NOTE — ED ADULT TRIAGE NOTE - CHIEF COMPLAINT QUOTE
SOB, chest tightness, and generalized weakness x 2 days, noted to be bradycardic and hypotensive, recently diagnosed with liver cancer, hx of T2DM, HTN, esophagal varices, BGL 47 SOB, chest tightness, and generalized weakness x 2 days, noted to be bradycardic and hypotensive, recently diagnosed with liver cancer, hx of T2DM, HTN, esophagal varices, BGL 47, brought directly to room 9

## 2024-04-10 NOTE — H&P ADULT - NSICDXPASTMEDICALHX_GEN_ALL_CORE_FT
PAST MEDICAL HISTORY:  Diabetes     Hepatitis-C     Hepatocellular carcinoma     Hypertension     Liver cirrhosis

## 2024-04-10 NOTE — ED PROVIDER NOTE - SEVERE SEPSIS ALERT DETAILS
Pt is afebrile, she has advanced hepatic carcinoma, low BP likely due to decreased PO intake and low volume status

## 2024-04-10 NOTE — H&P ADULT - NSHPLABSRESULTS_GEN_ALL_CORE
LABS:                        12.5   9.28  )-----------( 174      ( 10 Apr 2024 09:23 )             35.5     04-10    125<L>  |  87<L>  |  54<H>  ----------------------------<  35<LL>  4.5   |  20<L>  |  2.43<H>    Ca    8.5      10 Apr 2024 09:23  Phos  5.1     04-10  Mg     2.50     04-10    TPro  5.7<L>  /  Alb  2.5<L>  /  TBili  34.9<H>  /  DBili  x   /  AST  241<H>  /  ALT  97<H>  /  AlkPhos  494<H>  04-10          CARDIAC MARKERS ( 10 Apr 2024 11:17 )  16 ng/L / x     / x     / x     / x     / x      CARDIAC MARKERS ( 10 Apr 2024 09:23 )  22 ng/L / x     / x     / x     / x     / x          Urinalysis Basic - ( 10 Apr 2024 09:23 )    Color: x / Appearance: x / SG: x / pH: x  Gluc: 35 mg/dL / Ketone: x  / Bili: x / Urobili: x   Blood: x / Protein: x / Nitrite: x   Leuk Esterase: x / RBC: x / WBC x   Sq Epi: x / Non Sq Epi: x / Bacteria: x            RADIOLOGY:   < from: Xray Chest 1 View- PORTABLE-Urgent (Xray Chest 1 View- PORTABLE-Urgent .) (04.10.24 @ 13:12) >    FINDINGS:    Hazy right lung base consistent with trace effusion.  The lungs are clear.  Heart is mildly enlarged with left ventricular prominence.  No pneumothorax.  No acute bone changes.    < end of copied text >    < from: CT Abdomen and Pelvis No Cont (04.10.24 @ 13:43) >    MPRESSION:  Cirrhosis with portal hypertension.  Large right hepaticlobe heterogeneous hypoattenuating lesion measuring   1.1 x 7.7 cm, consistent with history of hepatocellular carcinoma.  Dilated main portal vein measuring up to 3.1 cm. Differentials include   bland versus tumor thrombosis. Addendum can be issued with comparison   with prior studies if available.    < end of copied text >

## 2024-04-10 NOTE — H&P ADULT - PROBLEM SELECTOR PLAN 5
Hypoglycemic currently iso hepatic failure  Hold home Glimipiride Hypoglycemic currently iso hepatic failure  Hold home Glimepiride

## 2024-04-10 NOTE — ED PROVIDER NOTE - CRITICAL CARE ATTENDING CONTRIBUTION TO CARE
DR. DURÁN, ATTENDING MD-  I personally saw the patient with the PA and performed a substantive portion of the visit including all aspects of the medical decision making.    74-year-old female history of liver cancer home hospice type 2 diabetes hypertension brought in by family for worsening weakness shortness of breath for last 2 days.  Additional complaint of syncopal episode yesterday.  No prior history of syncope.  Patient here is bradycardic hypotensive hypothermic.  Symptoms likely secondary to hypoglycemia from decreased p.o. intake.  Consider sepsis, thyroid dysfunction, electrolyte abnormality.  Will obtain EKG CBC CMP magnesium phosphorus venous blood gas blood cultures x 2 TSH urinalysis urine culture chest x-ray viral swab CT abdomen pelvis give IV fluid bolus D50 placed on hypothermia blanket, telemetry, patient is critically ill, full code, will need admission for further care and evaluation.

## 2024-04-10 NOTE — RAPID RESPONSE TEAM SUMMARY - NSSITUATIONBACKGROUNDRRT_GEN_ALL_CORE
74-year-old female with past medical history of recently diagnosed hepatic carcinoma, DM2, HTN, HLD, history of hepatitis C s/p treatment C/B cirrhosis presenting to the ER with weakness, chest tightness and shortness of breath. The patient initially was in [facility] and went to the hospital due to  increasing ascites and dyspnea. 10L fluids were removed. States long-standing hx of Hep C and complicated by cirrhosis. PAtient followed up with hepatology at [facility] and was sent directly to ED given clinical symptoms. The patient was admitted to [facility] for about  a month  and was diagnosed with HCC at that time. No Bx was done at the time. No treatment was offered by oncology team at [facility] and was managed palliatively and discharged with home hospice. Over past few days the patient developed reduced appetite, generalized fatigue. Last week she had paracentesis which removed 2.5L ascitic fluid. This AM she developed chest tightness and shortness of breath.  Reports abdominal discomfort at her baseline.  Denies fever/chills, palpitations, syncope, vomiting, diarrhea, black or bloody stool or any other concerns.     RRT called for hypotension and bradycardia BP 68/48 HR 44. On arrival BP 80s/50s HR 55, pt asymptomatic, finder stick 65, afebrile, saturating 100% on RA. Physical exam remarkable for anasarca with pitting edema up to bilateral hips. Repeat BP was 94/48, pt reported she felt better, she had a BM when event happened. Started on albumin; held off midodrine as pt HR was in 50s and held off further IVF due to anasarca. As pt was hypoglycemia; pt received 1/2 amp D50. Primary team at bedside; if pt not responding to albumin would consult MICU as she has been recurrently hypotensive and appears overloaded.  Confirm w/ primary team pt is full code and family would want pressors and MICU level of care if indicated.

## 2024-04-10 NOTE — H&P ADULT - HISTORY OF PRESENT ILLNESS
74-year-old female with past medical history of recently diagnosed hepatic carcinoma, DM2, HTN, HLD, history of hepatitis C s/p treatment C/B cirrhosis presenting to the ER with weakness, chest tightness and shortness of breath. The patient initially was in [facility] and went to the hospital due to  increasing ascites and dyspnea. 10L fluids were removed. States long-standing hx of Hep C and complicated by cirrhosis. PAtient followed up with hepatology at [facility] and was sent directly to ED given clinical symptoms. The patient was admitted to [facility] for about  a month  and was diagnosed with HCC at that time. No Bx was done at the time. No treatment was offered by oncology team at [facility] and was managed palliatively and discharged with home hospice. Over past few days the patient developed reduced appetite, generalized fatigue. Last week she had paracentesis which removed 2.5L ascitic fluid. This AM she developed chest tightness and shortness of breath.  Reports abdominal discomfort at her baseline.  Denies fever/chills, palpitations, syncope, vomiting, diarrhea, black or bloody stool or any other concerns.     AT [facility] ED the patient vitals were significant for BP of 83/47, pulse in 50s, saturating 99% RA.

## 2024-04-10 NOTE — H&P ADULT - NSHPREVIEWOFSYSTEMS_GEN_ALL_CORE
Constitutional: No fevers, chills, weight loss , endorses fatigue  Skin: No rash, no phlebitis, jaundice	  Eyes: No discharge	  ENMT: No sore throat, oral thrush, ulcers or exudate  Respiratory: No cough, endorses SOB  Cardiovascular:  No chest pain, palpitations or edema   Gastrointestinal: Pain on eating, abdominal distension	  Genitourinary: No dysuria, discharge or flank pain  MSK: No arthralgias or back pain   Neurological: No HA, no weakness, no seizures, no AMS

## 2024-04-11 DIAGNOSIS — E16.2 HYPOGLYCEMIA, UNSPECIFIED: ICD-10-CM

## 2024-04-11 DIAGNOSIS — R79.89 OTHER SPECIFIED ABNORMAL FINDINGS OF BLOOD CHEMISTRY: ICD-10-CM

## 2024-04-11 DIAGNOSIS — I95.9 HYPOTENSION, UNSPECIFIED: ICD-10-CM

## 2024-04-11 DIAGNOSIS — Z51.5 ENCOUNTER FOR PALLIATIVE CARE: ICD-10-CM

## 2024-04-11 DIAGNOSIS — R52 PAIN, UNSPECIFIED: ICD-10-CM

## 2024-04-11 DIAGNOSIS — E11.9 TYPE 2 DIABETES MELLITUS WITHOUT COMPLICATIONS: ICD-10-CM

## 2024-04-11 DIAGNOSIS — Z71.89 OTHER SPECIFIED COUNSELING: ICD-10-CM

## 2024-04-11 DIAGNOSIS — R00.1 BRADYCARDIA, UNSPECIFIED: ICD-10-CM

## 2024-04-11 DIAGNOSIS — E87.1 HYPO-OSMOLALITY AND HYPONATREMIA: ICD-10-CM

## 2024-04-11 LAB
A1C WITH ESTIMATED AVERAGE GLUCOSE RESULT: <4 % — LOW (ref 4–5.6)
ADD ON TEST-SPECIMEN IN LAB: SIGNIFICANT CHANGE UP
ALBUMIN SERPL ELPH-MCNC: 3 G/DL — LOW (ref 3.3–5)
ALBUMIN SERPL ELPH-MCNC: 3.2 G/DL — LOW (ref 3.3–5)
ALBUMIN SERPL ELPH-MCNC: 3.3 G/DL — SIGNIFICANT CHANGE UP (ref 3.3–5)
ALP SERPL-CCNC: 326 U/L — HIGH (ref 40–120)
ALP SERPL-CCNC: 353 U/L — HIGH (ref 40–120)
ALT FLD-CCNC: 76 U/L — HIGH (ref 4–33)
ALT FLD-CCNC: 77 U/L — HIGH (ref 4–33)
ANION GAP SERPL CALC-SCNC: 18 MMOL/L — HIGH (ref 7–14)
ANION GAP SERPL CALC-SCNC: 18 MMOL/L — HIGH (ref 7–14)
ANION GAP SERPL CALC-SCNC: 21 MMOL/L — HIGH (ref 7–14)
AST SERPL-CCNC: 154 U/L — HIGH (ref 4–32)
AST SERPL-CCNC: 179 U/L — HIGH (ref 4–32)
B-OH-BUTYR SERPL-SCNC: <0 MMOL/L — SIGNIFICANT CHANGE UP (ref 0–0.4)
BASE EXCESS BLDV CALC-SCNC: -7.4 MMOL/L — LOW (ref -2–3)
BASOPHILS # BLD AUTO: 0.01 K/UL — SIGNIFICANT CHANGE UP (ref 0–0.2)
BASOPHILS NFR BLD AUTO: 0.2 % — SIGNIFICANT CHANGE UP (ref 0–2)
BILIRUB SERPL-MCNC: 35.3 MG/DL — HIGH (ref 0.2–1.2)
BILIRUB SERPL-MCNC: 35.4 MG/DL — HIGH (ref 0.2–1.2)
BUN SERPL-MCNC: 49 MG/DL — HIGH (ref 7–23)
BUN SERPL-MCNC: 52 MG/DL — HIGH (ref 7–23)
BUN SERPL-MCNC: 52 MG/DL — HIGH (ref 7–23)
CA-I SERPL-SCNC: 1.08 MMOL/L — LOW (ref 1.15–1.33)
CALCIUM SERPL-MCNC: 7.8 MG/DL — LOW (ref 8.4–10.5)
CALCIUM SERPL-MCNC: 8 MG/DL — LOW (ref 8.4–10.5)
CALCIUM SERPL-MCNC: 8.1 MG/DL — LOW (ref 8.4–10.5)
CHLORIDE BLDV-SCNC: 95 MMOL/L — LOW (ref 96–108)
CHLORIDE SERPL-SCNC: 95 MMOL/L — LOW (ref 98–107)
CO2 BLDV-SCNC: 20.6 MMOL/L — LOW (ref 22–26)
CO2 SERPL-SCNC: 12 MMOL/L — LOW (ref 22–31)
CO2 SERPL-SCNC: 16 MMOL/L — LOW (ref 22–31)
CO2 SERPL-SCNC: 16 MMOL/L — LOW (ref 22–31)
CREAT SERPL-MCNC: 2.09 MG/DL — HIGH (ref 0.5–1.3)
CREAT SERPL-MCNC: 2.18 MG/DL — HIGH (ref 0.5–1.3)
CREAT SERPL-MCNC: 2.2 MG/DL — HIGH (ref 0.5–1.3)
EGFR: 23 ML/MIN/1.73M2 — LOW
EGFR: 23 ML/MIN/1.73M2 — LOW
EGFR: 24 ML/MIN/1.73M2 — LOW
EOSINOPHIL # BLD AUTO: 0.02 K/UL — SIGNIFICANT CHANGE UP (ref 0–0.5)
EOSINOPHIL NFR BLD AUTO: 0.4 % — SIGNIFICANT CHANGE UP (ref 0–6)
ESTIMATED AVERAGE GLUCOSE: <68 — SIGNIFICANT CHANGE UP
GAS PNL BLDV: 125 MMOL/L — LOW (ref 136–145)
GAS PNL BLDV: SIGNIFICANT CHANGE UP
GAS PNL BLDV: SIGNIFICANT CHANGE UP
GLUCOSE BLDC GLUCOMTR-MCNC: 115 MG/DL — HIGH (ref 70–99)
GLUCOSE BLDC GLUCOMTR-MCNC: 142 MG/DL — HIGH (ref 70–99)
GLUCOSE BLDV-MCNC: 137 MG/DL — HIGH (ref 70–99)
GLUCOSE SERPL-MCNC: 145 MG/DL — HIGH (ref 70–99)
GLUCOSE SERPL-MCNC: 50 MG/DL — CRITICAL LOW (ref 70–99)
GLUCOSE SERPL-MCNC: 71 MG/DL — SIGNIFICANT CHANGE UP (ref 70–99)
HCO3 BLDV-SCNC: 19 MMOL/L — LOW (ref 22–29)
HCT VFR BLD CALC: 29.9 % — LOW (ref 34.5–45)
HCT VFR BLDA CALC: 33 % — LOW (ref 34.5–46.5)
HGB BLD CALC-MCNC: 11 G/DL — LOW (ref 11.7–16.1)
HGB BLD-MCNC: 10.4 G/DL — LOW (ref 11.5–15.5)
IANC: 4.12 K/UL — SIGNIFICANT CHANGE UP (ref 1.8–7.4)
IMM GRANULOCYTES NFR BLD AUTO: 2.5 % — HIGH (ref 0–0.9)
LACTATE BLDV-MCNC: 3.6 MMOL/L — HIGH (ref 0.5–2)
LYMPHOCYTES # BLD AUTO: 0.76 K/UL — LOW (ref 1–3.3)
LYMPHOCYTES # BLD AUTO: 13.5 % — SIGNIFICANT CHANGE UP (ref 13–44)
MAGNESIUM SERPL-MCNC: 2.3 MG/DL — SIGNIFICANT CHANGE UP (ref 1.6–2.6)
MAGNESIUM SERPL-MCNC: 2.3 MG/DL — SIGNIFICANT CHANGE UP (ref 1.6–2.6)
MCHC RBC-ENTMCNC: 33.7 PG — SIGNIFICANT CHANGE UP (ref 27–34)
MCHC RBC-ENTMCNC: 34.8 GM/DL — SIGNIFICANT CHANGE UP (ref 32–36)
MCV RBC AUTO: 96.8 FL — SIGNIFICANT CHANGE UP (ref 80–100)
MONOCYTES # BLD AUTO: 0.56 K/UL — SIGNIFICANT CHANGE UP (ref 0–0.9)
MONOCYTES NFR BLD AUTO: 10 % — SIGNIFICANT CHANGE UP (ref 2–14)
NEUTROPHILS # BLD AUTO: 4.12 K/UL — SIGNIFICANT CHANGE UP (ref 1.8–7.4)
NEUTROPHILS NFR BLD AUTO: 73.4 % — SIGNIFICANT CHANGE UP (ref 43–77)
NRBC # BLD: 0 /100 WBCS — SIGNIFICANT CHANGE UP (ref 0–0)
NRBC # FLD: 0.03 K/UL — HIGH (ref 0–0)
PCO2 BLDV: 43 MMHG — SIGNIFICANT CHANGE UP (ref 39–52)
PH BLDV: 7.26 — LOW (ref 7.32–7.43)
PHOSPHATE SERPL-MCNC: 5.1 MG/DL — HIGH (ref 2.5–4.5)
PLATELET # BLD AUTO: 108 K/UL — LOW (ref 150–400)
PO2 BLDV: 69 MMHG — HIGH (ref 25–45)
POTASSIUM BLDV-SCNC: 4.3 MMOL/L — SIGNIFICANT CHANGE UP (ref 3.5–5.1)
POTASSIUM SERPL-MCNC: 4 MMOL/L — SIGNIFICANT CHANGE UP (ref 3.5–5.3)
POTASSIUM SERPL-MCNC: 4.3 MMOL/L — SIGNIFICANT CHANGE UP (ref 3.5–5.3)
POTASSIUM SERPL-MCNC: 4.5 MMOL/L — SIGNIFICANT CHANGE UP (ref 3.5–5.3)
POTASSIUM SERPL-SCNC: 4 MMOL/L — SIGNIFICANT CHANGE UP (ref 3.5–5.3)
POTASSIUM SERPL-SCNC: 4.3 MMOL/L — SIGNIFICANT CHANGE UP (ref 3.5–5.3)
POTASSIUM SERPL-SCNC: 4.5 MMOL/L — SIGNIFICANT CHANGE UP (ref 3.5–5.3)
PROT SERPL-MCNC: 5.1 G/DL — LOW (ref 6–8.3)
PROT SERPL-MCNC: 5.3 G/DL — LOW (ref 6–8.3)
RBC # BLD: 3.09 M/UL — LOW (ref 3.8–5.2)
RBC # FLD: 19.7 % — HIGH (ref 10.3–14.5)
SAO2 % BLDV: 92.4 % — HIGH (ref 67–88)
SODIUM SERPL-SCNC: 128 MMOL/L — LOW (ref 135–145)
SODIUM SERPL-SCNC: 129 MMOL/L — LOW (ref 135–145)
SODIUM SERPL-SCNC: 129 MMOL/L — LOW (ref 135–145)
WBC # BLD: 5.61 K/UL — SIGNIFICANT CHANGE UP (ref 3.8–10.5)
WBC # FLD AUTO: 5.61 K/UL — SIGNIFICANT CHANGE UP (ref 3.8–10.5)

## 2024-04-11 PROCEDURE — 99223 1ST HOSP IP/OBS HIGH 75: CPT | Mod: GC

## 2024-04-11 PROCEDURE — 99233 SBSQ HOSP IP/OBS HIGH 50: CPT | Mod: GC

## 2024-04-11 PROCEDURE — 99222 1ST HOSP IP/OBS MODERATE 55: CPT | Mod: GC

## 2024-04-11 RX ORDER — SODIUM BICARBONATE 1 MEQ/ML
650 SYRINGE (ML) INTRAVENOUS DAILY
Refills: 0 | Status: DISCONTINUED | OUTPATIENT
Start: 2024-04-11 | End: 2024-04-11

## 2024-04-11 RX ORDER — SODIUM BICARBONATE 1 MEQ/ML
650 SYRINGE (ML) INTRAVENOUS DAILY
Refills: 0 | Status: DISCONTINUED | OUTPATIENT
Start: 2024-04-11 | End: 2024-04-15

## 2024-04-11 RX ORDER — CEFTRIAXONE 500 MG/1
1000 INJECTION, POWDER, FOR SOLUTION INTRAMUSCULAR; INTRAVENOUS EVERY 24 HOURS
Refills: 0 | Status: DISCONTINUED | OUTPATIENT
Start: 2024-04-11 | End: 2024-04-15

## 2024-04-11 RX ORDER — SODIUM CHLORIDE 9 MG/ML
1000 INJECTION, SOLUTION INTRAVENOUS
Refills: 0 | Status: DISCONTINUED | OUTPATIENT
Start: 2024-04-11 | End: 2024-04-11

## 2024-04-11 RX ORDER — DEXTROSE 50 % IN WATER 50 %
25 SYRINGE (ML) INTRAVENOUS ONCE
Refills: 0 | Status: COMPLETED | OUTPATIENT
Start: 2024-04-11 | End: 2024-04-11

## 2024-04-11 RX ORDER — CEFTRIAXONE 500 MG/1
1000 INJECTION, POWDER, FOR SOLUTION INTRAMUSCULAR; INTRAVENOUS EVERY 24 HOURS
Refills: 0 | Status: DISCONTINUED | OUTPATIENT
Start: 2024-04-11 | End: 2024-04-11

## 2024-04-11 RX ORDER — PETROLATUM,WHITE
1 JELLY (GRAM) TOPICAL EVERY 6 HOURS
Refills: 0 | Status: DISCONTINUED | OUTPATIENT
Start: 2024-04-11 | End: 2024-04-15

## 2024-04-11 RX ADMIN — Medication 100 MILLILITER(S): at 22:38

## 2024-04-11 RX ADMIN — OCTREOTIDE ACETATE 10 MICROGRAM(S)/HR: 200 INJECTION, SOLUTION INTRAVENOUS; SUBCUTANEOUS at 19:40

## 2024-04-11 RX ADMIN — HEPARIN SODIUM 5000 UNIT(S): 5000 INJECTION INTRAVENOUS; SUBCUTANEOUS at 13:23

## 2024-04-11 RX ADMIN — Medication 1 APPLICATION(S): at 23:45

## 2024-04-11 RX ADMIN — SODIUM CHLORIDE 30 MILLILITER(S): 9 INJECTION, SOLUTION INTRAVENOUS at 00:45

## 2024-04-11 RX ADMIN — Medication 650 MILLIGRAM(S): at 19:39

## 2024-04-11 RX ADMIN — LACTULOSE 10 GRAM(S): 10 SOLUTION ORAL at 22:39

## 2024-04-11 RX ADMIN — LACTULOSE 10 GRAM(S): 10 SOLUTION ORAL at 05:01

## 2024-04-11 RX ADMIN — CEFTRIAXONE 100 MILLIGRAM(S): 500 INJECTION, POWDER, FOR SOLUTION INTRAMUSCULAR; INTRAVENOUS at 13:27

## 2024-04-11 RX ADMIN — Medication 100 MILLILITER(S): at 05:02

## 2024-04-11 RX ADMIN — HEPARIN SODIUM 5000 UNIT(S): 5000 INJECTION INTRAVENOUS; SUBCUTANEOUS at 05:02

## 2024-04-11 RX ADMIN — PANTOPRAZOLE SODIUM 40 MILLIGRAM(S): 20 TABLET, DELAYED RELEASE ORAL at 05:02

## 2024-04-11 RX ADMIN — SODIUM CHLORIDE 30 MILLILITER(S): 9 INJECTION, SOLUTION INTRAVENOUS at 11:50

## 2024-04-11 RX ADMIN — LACTULOSE 10 GRAM(S): 10 SOLUTION ORAL at 13:24

## 2024-04-11 RX ADMIN — Medication 100 MILLILITER(S): at 13:31

## 2024-04-11 RX ADMIN — Medication 1 APPLICATION(S): at 18:39

## 2024-04-11 RX ADMIN — HEPARIN SODIUM 5000 UNIT(S): 5000 INJECTION INTRAVENOUS; SUBCUTANEOUS at 22:39

## 2024-04-11 RX ADMIN — OCTREOTIDE ACETATE 10 MICROGRAM(S)/HR: 200 INJECTION, SOLUTION INTRAVENOUS; SUBCUTANEOUS at 11:09

## 2024-04-11 RX ADMIN — OCTREOTIDE ACETATE 10 MICROGRAM(S)/HR: 200 INJECTION, SOLUTION INTRAVENOUS; SUBCUTANEOUS at 00:49

## 2024-04-11 RX ADMIN — Medication 25 MILLILITER(S): at 02:10

## 2024-04-11 NOTE — CONSULT NOTE ADULT - ASSESSMENT
Ms. Trevino is a 74-year-old female with HCV-related cirrhosis c/b ascites, esophageal varices and recently diagnosed hepatocellular carcinoma, DM2, HTN and HLD who presented to the ER with weakness, chest tightness and shortness of breath.     #Decompensated HCV-related cirrhosis  #Ascites  #Hepatocellular carcinoma  #Tumor thrombus  Patient with HCV-related cirrhosis, s/p treatment with interferon (few sessions) followed by Liliana in the early 2000s with SVR, follows at [facility]. She had mild constitutional changes over the past year with initial decompensated event of ascites in January 2024 requiring large-volume paracentesis. During a recent hospital stay at [facility], patient was diagnosed with HCC and tumor thrombus, not offered treatment and discharged on home hospice. She returns for symptomatic ascites and second opinion regarding cirrhosis/HCC treatment options.   MELD 3.0 score = 36   Ascites: large volume, s/p paracentesis x2 (10L in January in [facility], 2.5L 4/4 @ [facility]), on lasix 20 mg and aldactone 25 mg which are held due to hypotension   HCC: Non-contrast CT with Large right hepaticlobe heterogeneous hypoattenuating lesion measuring 1.1 x 7.7 cm, consistent with history of hepatocellular carcinoma. 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 Imaging modality is sub-optimal; will require imaging studies from [facility] for enhanced characterization.   Esophageal varices: noted on prior EGDs, on Coreg 3.125 mg (held due to hypotension), no prior hemorrhage or banding  Hepatic encephalopathy: none on exam or by history, on lactulose for prevention due to prior constipation     Recommendations:  - Please obtain imaging studies performed at [facility] for review  - Therapeutic paracentesis as needed  - Hold diuretics and beta blocker due to hypotension  - Anticoagulation not recommended for potential tumor thrombus  - Bowel regimen to goal of soft stool daily   - Stop octreotide gtt   - Trend CBC, CMP and PT/INR daily     Ric Colvin MD  Gastroenterology/Hepatology Fellow  Available via [employer] Teams  Pager: [phone]    NON-URGENT CONSULTS:  Please email [email or web address]  [email or web address]  AT NIGHT AND ON WEEKENDS:  Contact on-call GI fellow via answering service ([phone]) from 5pm-8am and on weekends/holidays  MONDAY-FRIDAY 8AM-5PM:  Pager# [phone] ([facility]) or [phone] ([facility]) Ms. Trevino is a 74-year-old female with HCV-related cirrhosis c/b ascites, esophageal varices and recently diagnosed hepatocellular carcinoma, DM2, HTN and HLD who presented to the ER with weakness, chest tightness and shortness of breath.     #Decompensated HCV-related cirrhosis  #Ascites  #Hepatocellular carcinoma  #Tumor thrombus  Patient with HCV-related cirrhosis, s/p treatment with interferon (few sessions) followed by Liliana in the early 2000s with SVR, follows at [facility]. She had mild constitutional changes over the past year with initial decompensated event of ascites in January 2024 requiring large-volume paracentesis. During a recent hospital stay at [facility], patient was diagnosed with HCC and tumor thrombus, not offered treatment and discharged on home hospice. She returns for symptomatic ascites and second opinion regarding cirrhosis/HCC treatment options.   MELD 3.0 score = 36   Ascites: large volume, s/p paracentesis x2 (10L in January in [facility], 2.5L 4/4 @ [facility]), on lasix 20 mg and aldactone 25 mg which are held due to hypotension   HCC: Non-contrast CT with Large right hepaticlobe heterogeneous hypoattenuating lesion measuring 1.1 x 7.7 cm, consistent with history of hepatocellular carcinoma. 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 Imaging modality is sub-optimal; will require imaging studies from [facility] for enhanced characterization.   Esophageal varices: noted on prior EGDs, on Coreg 3.125 mg (held due to hypotension), no prior hemorrhage or banding  Hepatic encephalopathy: none on exam or by history, on lactulose for prevention due to prior constipation     Recommendations:  - Please obtain imaging studies performed at [facility] for review  - Therapeutic paracentesis as needed  - Hold diuretics and beta blocker due to hypotension  - Anticoagulation not recommended for potential tumor thrombus  - Bowel regimen to goal of soft stool daily   - Stop octreotide gtt   - Low sodium diet  - Trend CBC, CMP and PT/INR daily     Ric Colvin MD  Gastroenterology/Hepatology Fellow  Available via Microsoft Teams  Pager: [phone]    NON-URGENT CONSULTS:  Please email [email or web address]  [email or web address]  AT NIGHT AND ON WEEKENDS:  Contact on-call GI fellow via answering service ([phone]) from 5pm-8am and on weekends/holidays  MONDAY-FRIDAY 8AM-5PM:  Pager# [phone] ([facility]) or [phone] ([facility]) Ms. Trevino is a 74-year-old female with HCV-related cirrhosis c/b ascites, esophageal varices and recently diagnosed hepatocellular carcinoma, DM2, HTN and HLD who presented to the ER with weakness, chest tightness and shortness of breath.     #Decompensated HCV-related cirrhosis  #Ascites  #Hepatocellular carcinoma  #Tumor thrombus  #Hypotension, likely due to vasoplegia   #Renal dysfunction  Patient with HCV-related cirrhosis, s/p treatment with interferon (few sessions) followed by Liliana in the early 2000s with SVR, follows at [facility]. She had mild constitutional changes over the past year with initial decompensated event of ascites in January 2024 requiring large-volume paracentesis. During a recent hospital stay at [facility], patient was diagnosed with HCC and tumor thrombus, not offered treatment and discharged on home hospice. She returns for symptomatic ascites and second opinion regarding cirrhosis/HCC treatment options.   MELD 3.0 score = 36   Ascites: large volume, s/p paracentesis x2 (10L in January in [facility], 2.5L 4/4 @ [facility]), on lasix 20 mg and aldactone 25 mg which are held due to hypotension   HCC: Non-contrast CT with Large right hepaticlobe heterogeneous hypoattenuating lesion measuring 1.1 x 7.7 cm, consistent with history of hepatocellular carcinoma. 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 Imaging modality is sub-optimal; will require imaging studies from [facility] for enhanced characterization.   Esophageal varices: noted on prior EGDs, on Coreg 3.125 mg (held due to hypotension), no prior hemorrhage or banding  Hepatic encephalopathy: none on exam or by history, on lactulose for prevention due to prior constipation   Renal dysfunction: no recent baseline, however family reports no mention of kidney dysfunction at [facility]    Recommendations:  - Please obtain MR abdomen and echocardiogram performed at [facility] for review, as well as CMP trend  - Therapeutic paracentesis as needed  - Hold diuretics and beta blocker due to hypotension  - Anticoagulation not recommended for potential tumor thrombus  - Bowel regimen to goal of soft stool daily   - Okay to continue empiric albumin and octreotide gtt for possible HRS   - Limit IV NS  - Low sodium diet  - Trend CBC, CMP and PT/INR daily     Ric Colvin MD  Gastroenterology/Hepatology Fellow  Available via Microsoft Teams  Pager: [phone]    NON-URGENT CONSULTS:  Please email [email or web address]  [email or web address]  AT NIGHT AND ON WEEKENDS:  Contact on-call GI fellow via answering service ([phone]) from 5pm-8am and on weekends/holidays  MONDAY-FRIDAY 8AM-5PM:  Pager# [phone] ([facility]) or [phone] ([facility]) Ms. Trevino is a 74-year-old female with HCV-related cirrhosis c/b ascites, esophageal varices and recently diagnosed hepatocellular carcinoma, DM2, HTN and HLD who presented to the ER with weakness, chest tightness and shortness of breath.     #Decompensated HCV-related cirrhosis  #Ascites  #Hepatocellular carcinoma  #Tumor thrombus  #Hypotension, likely due to vasoplegia   #Renal dysfunction  Patient with HCV-related cirrhosis, s/p treatment with interferon (few sessions) followed by Liliana in the early 2000s with SVR, follows at [facility]. She had mild constitutional changes over the past year with initial decompensated event of ascites in January 2024 requiring large-volume paracentesis. During a recent hospital stay at [facility], patient was diagnosed with HCC and tumor thrombus, not offered treatment and discharged on home hospice. She returns for symptomatic ascites and second opinion regarding cirrhosis/HCC treatment options.   MELD 3.0 score = 36   Ascites: large volume, s/p paracentesis x2 (10L in January in [facility], 2.5L 4/4 @ [facility]), on lasix 20 mg and aldactone 25 mg which are held due to hypotension   HCC: Non-contrast CT with Large right hepaticlobe heterogeneous hypoattenuating lesion measuring 1.1 x 7.7 cm, consistent with history of hepatocellular carcinoma. 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 Imaging modality is sub-optimal; will require imaging studies from [facility] for enhanced characterization.   Esophageal varices: noted on prior EGDs, on Coreg 3.125 mg (held due to hypotension), no prior hemorrhage or banding  Hepatic encephalopathy: none on exam or by history, on lactulose for prevention due to prior constipation   Renal dysfunction: no recent baseline, however family reports no mention of kidney dysfunction at [facility]    Recommendations:  - Please obtain MR abdomen and echocardiogram performed at [facility] for review, as well as CMP trend  - Therapeutic paracentesis as needed  - Hold diuretics and beta blocker due to hypotension  - Anticoagulation not recommended for potential tumor thrombus  - Bowel regimen to goal of soft stool daily   - Okay to continue empiric albumin and octreotide gtt for possible HRS   - Limit IV NS  - Low sodium diet  - No role for ERCP in the absence of biliary duct dilatation. Hyperbilirubinemia is due to cirrhosis and HCC. Bile ducts are normal caliber. Reviewed with advanced GI fellow.   - Trend CBC, CMP and PT/INR daily     Ric Colvin MD  Gastroenterology/Hepatology Fellow  Available via [employer] Teams  Pager: [phone]    NON-URGENT CONSULTS:  Please email [email or web address]  [email or web address]  AT NIGHT AND ON WEEKENDS:  Contact on-call GI fellow via answering service ([phone]) from 5pm-8am and on weekends/holidays  MONDAY-FRIDAY 8AM-5PM:  Pager# [phone] ([facility]) or [phone] ([facility]) Ms. Trevino is a 74-year-old female with HCV-related cirrhosis c/b ascites, esophageal varices and recently diagnosed hepatocellular carcinoma, DM2, HTN and HLD who presented to the ER with weakness, chest tightness and shortness of breath.     #Decompensated HCV-related cirrhosis  #Ascites  #Hepatocellular carcinoma  #Tumor thrombus  #Hypotension, likely due to vasoplegia   #Renal dysfunction  Patient with HCV-related cirrhosis, s/p treatment with interferon (few sessions) followed by Liliana in the early 2000s with SVR, follows at [facility]. She had mild constitutional changes over the past year with initial decompensated event of ascites in January 2024 requiring large-volume paracentesis. During a recent hospital stay at [facility], patient was diagnosed with HCC and tumor thrombus, not offered treatment and discharged on home hospice. She returns for symptomatic ascites and second opinion regarding cirrhosis/HCC treatment options.   MELD 3.0 score = 36   Ascites: large volume, s/p paracentesis x2 (10L in January in [facility], 2.5L 4/4 @ [facility]), on lasix 20 mg and aldactone 25 mg which are held due to hypotension   HCC: Non-contrast CT with Large right hepaticlobe heterogeneous hypoattenuating lesion measuring 1.1 x 7.7 cm, consistent with history of hepatocellular carcinoma. 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 Imaging modality is sub-optimal; will require imaging studies from [facility] for enhanced characterization.   Esophageal varices: noted on prior EGDs, on Coreg 3.125 mg (held due to hypotension), no prior hemorrhage or banding  Hepatic encephalopathy: none on exam or by history, on lactulose for prevention due to prior constipation   Renal dysfunction: no recent baseline, however family reports no mention of kidney dysfunction at [facility]    Recommendations:  - Please obtain MR abdomen and echocardiogram performed at [facility] for review, as well as CMP trend  - Diagnostic/therapeutic paracentesis with cell counts and cultures  - Empiric Zosyn   - Hold diuretics and beta blocker due to hypotension  - Anticoagulation not recommended for potential tumor thrombus  - Bowel regimen to goal of soft stool daily   - Okay to continue empiric albumin and octreotide gtt for possible HRS   - Limit IV NS  - Low sodium diet  - No role for ERCP in the absence of biliary duct dilatation. Hyperbilirubinemia is due to cirrhosis and HCC. Bile ducts are normal caliber. Reviewed with advanced GI fellow.   - Trend CBC, CMP and PT/INR daily     Ric Colvin MD  Gastroenterology/Hepatology Fellow  Available via [employer] Teams  Pager: [phone]    NON-URGENT CONSULTS:  Please email [email or web address]  [email or web address]  AT NIGHT AND ON WEEKENDS:  Contact on-call GI fellow via answering service ([phone]) from 5pm-8am and on weekends/holidays  MONDAY-FRIDAY 8AM-5PM:  Pager# [phone] ([facility]) or [phone] ([facility])

## 2024-04-11 NOTE — CONSULT NOTE ADULT - PROBLEM SELECTOR RECOMMENDATION 4
- HCP document completed by primary team and confirmed with patient  - Primary is Son Neptali Terry [phone], Secondary is daughter Andrews Terry [phone]  - Full code - HCP document completed by primary team, placed in chart and confirmed with patient  - Primary is Son Neptali Terry [phone], Secondary is daughter Andrews Terry [phone]  - Full code - HCP document completed by primary team, placed in chart and confirmed with patient  - Primary is Son Neptali Stewart [phone], Secondary is daughter Andrews Stewart [phone]    Please page [phone] for any questions or concerns.

## 2024-04-11 NOTE — PROVIDER CONTACT NOTE (HYPOGLYCEMIA EVENT) - NS PROVIDER CONTACT BACKGROUND-HYPO
Age: 74y    Gender: Female    POCT Blood Glucose:  62 mg/dL (04-11-24 @ 02:10)  60 mg/dL (04-11-24 @ 02:06)  73 mg/dL (04-11-24 @ 00:15)  82 mg/dL (04-10-24 @ 19:08)  111 mg/dL (04-10-24 @ 18:01)  64 mg/dL (04-10-24 @ 17:46)  118 mg/dL (04-10-24 @ 11:20)  140 mg/dL (04-10-24 @ 09:57)      eMAR:dextrose 50% Injectable   25 milliLiter(s) IV Push (04-10-24 @ 09:15)    dextrose 50% Injectable   25 milliLiter(s) IV Push (04-11-24 @ 02:10)    dextrose 50% Injectable   25 milliLiter(s) IV Push (04-10-24 @ 17:57)    octreotide  Infusion   10 mL/Hr IV Continuous (04-10-24 @ 21:39)

## 2024-04-11 NOTE — CONSULT NOTE ADULT - SUBJECTIVE AND OBJECTIVE BOX
CC: Patient is a 74y old  Female who presents with a chief complaint of Weakness and SOB (11 Apr 2024 08:57)    HPI:  74-year-old female with past medical history of recently diagnosed HCC (reportedly diagnosed at [facility]), DM2, HTN, HLD, history of hepatitis C s/p treatment C/B cirrhosis presenting to the ER with weakness, chest tightness and shortness of breath. The patient initially was in [facility] and went to the hospital due to increasing ascites and dyspnea. 10L fluids were removed. States long-standing hx of Hep C and complicated by cirrhosis. PAtient followed up with hepatology at [facility] and was sent directly to ED given clinical symptoms. The patient was admitted to [facility] for about  a month  and was diagnosed with HCC at that time. No Bx was done at the time. No treatment was offered by oncology team at [facility] and was managed palliatively and discharged with home hospice. Over past few days the patient developed reduced appetite, generalized fatigue. Last week she had paracentesis which removed 2.5L ascitic fluid. This AM she developed chest tightness and shortness of breath.  Reports abdominal discomfort at her baseline.  Denies fever/chills, palpitations, syncope, vomiting, diarrhea, black or bloody stool or any other concerns.     AT [facility] ED the patient vitals were significant for BP of 83/47, pulse in 50s, saturating 99% RA.  (10 Apr 2024 16:07)    PAST MEDICAL & SURGICAL HISTORY:  Diabetes  Hypertension  Hepatitis-C  Liver cirrhosis  Hepatocellular carcinoma  No significant past surgical history    SOCIAL HISTORY:  Tobacco Usage:  (   ) never smoked   (   ) former smoker   (   ) current smoker  (     ) pack years    Tobacco Quit Date:  Substance Use (Street drugs): (  ) never used  (  ) other:  Alcohol Usage:    Family history reviewed and otherwise non-contributory  ALLERGIES:  penicillin (Other)    MEDICATIONS:  albumin human 25% IVPB 100 milliLiter(s) IV Intermittent every 8 hours  dextrose 10% + sodium chloride 0.9%. 1000 milliLiter(s) IV Continuous <Continuous>  heparin   Injectable 5000 Unit(s) SubCutaneous every 8 hours  influenza  Vaccine (HIGH DOSE) 0.7 milliLiter(s) IntraMuscular once  lactulose Syrup 10 Gram(s) Oral three times a day  lidocaine   4% Patch 1 Patch Transdermal daily PRN  octreotide  Infusion 50 MICROgram(s)/Hr IV Continuous <Continuous>  pantoprazole    Tablet 40 milliGRAM(s) Oral before breakfast    REVIEW OF SYSTEMS:  Negative except as above in HPI.    Vital Signs Last 24 Hrs  T(F): 97.6 (11 Apr 2024 07:40), Max: 97.8 (10 Apr 2024 16:09)  HR: 63 (11 Apr 2024 07:40) (53 - 63)  BP: 104/62 (11 Apr 2024 07:40) (91/55 - 106/64)  RR: 17 (11 Apr 2024 07:40) (17 - 18)  SpO2: 96% (11 Apr 2024 07:40) (96% - 100%)  I&O's Summary    PHYSICAL EXAM:  GENERAL: NAD, well-groomed  HEAD:  Atraumatic, Normocephalic  EYES: PERRLA, normal conjunctiva, anicteric  ENMT: Moist mucous membranes, no mucositis  NECK: Supple, No JVD  CHEST/LUNG: Clear to auscultation bilaterally; No rales, rhonchi, wheezing, or rubs  HEART: Regular rate and rhythm; S1/S2, No murmurs, rubs, or gallops  ABDOMEN: Soft, Nontender, Nondistended; Bowel sounds present  VASCULAR: Normal pulses, Normal capillary refill  EXTREMITIES:  2+ Peripheral Pulses, No cyanosis, No edema  LYMPH: No lymphadenopathy noted  SKIN: Warm, Intact  PSYCH: Normal mood and affect  NERVOUS SYSTEM:  A/O x3, CN 2-12 intact, No focal deficits    LABS:                        10.4   5.61  )-----------( 108      ( 11 Apr 2024 06:58 )             29.9     04-11    129  |  95  |  52  ----------------------------<  71  4.0   |  16  |  2.20    Ca    8.0      11 Apr 2024 06:58  Phos  5.1     04-11  Mg     2.30     04-11    TPro  5.3  /  Alb  3.2  /  TBili  35.3  /  DBili  x   /  AST  179  /  ALT  77  /  AlkPhos  353  04-11  PT/INR - ( 10 Apr 2024 19:38 )   PT: 17.2 sec;   INR: 1.54 ratio      TSH <0.10   TSH with FT4 reflex --  Total T3 --    19:38 - VBG - pH: 7.25  | pCO2: 51    | pO2: 39    | Lactate: 2.6    09:23 - VBG - pH: 7.39  | pCO2: 39    | pO2: 72    | Lactate: 2.7        RADIOLOGY & ADDITIONAL TESTS:  < from: CT Abdomen and Pelvis No Cont (04.10.24 @ 13:43) >    FINDINGS:  LOWER CHEST: Small right pleural effusion.    LIVER: Cirrhosis. Large right hepatic lobe heterogeneous hypoattenuating   lesion measuring 1.1 x 7.7 cm, consistent with history of hepatocellular   carcinoma.  BILE DUCTS: Normal caliber.  GALLBLADDER: Cholelithiasis.  SPLEEN: Within normal limits.  PANCREAS: Within normal limits.  ADRENALS: Within normal limits.  KIDNEYS/URETERS: Within normal limits.    BLADDER: Underdistended.  REPRODUCTIVE ORGANS: Uterus and adnexa within normal limits.    BOWEL: No bowel obstruction. Moderate edematous mural thickening of the   stomach. Diffuse mural thickening of the colon, most pronounced in the   ascending colon, suggestive of portal hypertensive colopathy. Normal   appendix  PERITONEUM: Small ascites.  VESSELS: Dilated main portal vein measuring up to 3.1 cm. Perisplenic and   perigastric varices. Atherosclerotic changes.  RETROPERITONEUM/LYMPH NODES: No lymphadenopathy.  ABDOMINAL WALL: Mild anasarca.  BONES: Degenerative changes.    IMPRESSION:  Cirrhosis with portal hypertension.  Large right hepaticlobe heterogeneous hypoattenuating lesion measuring   1.1 x 7.7 cm, consistent with history of hepatocellular carcinoma.  Dilated main portal vein measuring up to 3.1 cm. Differentials include   bland versus tumor thrombosis. Addendum can be issued with comparison   with prior studies if available.    < end of copied text >   CC: Patient is a 74y old  Female who presents with a chief complaint of Weakness and SOB (2024 08:57)    HPI:  74-year-old female with past medical history of recently diagnosed HCC (reportedly diagnosed at [facility]), DM2, HTN, HLD, history of hepatitis C s/p treatment C/B cirrhosis presenting to the ER with weakness, chest tightness and shortness of breath. The patient initially was in [facility] and went to the hospital due to increasing ascites and dyspnea. 10L fluids were removed. States long-standing hx of Hep C and complicated by cirrhosis. PAtient followed up with hepatology at [facility] and was sent directly to ED given clinical symptoms. The patient was admitted to [facility] for about  a month  and was diagnosed with HCC at that time. No Bx was done at the time. No treatment was offered by oncology team at [facility] and was managed palliatively and discharged with home hospice. Over past few days the patient developed reduced appetite, generalized fatigue. Last week she had paracentesis which removed 2.5L ascitic fluid. This AM she developed chest tightness and shortness of breath.  Reports abdominal discomfort at her baseline.     Spoke with patient's son (Gay), who is also her HCP, and he reports understanding that patient is not a candidate for any systemic chemotherapy or surgery at this time, but is interested in holistic/supportive medicine.    PAST MEDICAL & SURGICAL HISTORY:  Diabetes  Hypertension  Hepatitis-C  Liver cirrhosis  Hepatocellular carcinoma  Carpal tunnel release surgery   x2  Paracenteses    SOCIAL HISTORY:  Denies tobacco, EtOH, illicit substances    From [place]    Family history reviewed and otherwise non-contributory    ALLERGIES:  penicillin (Other)    MEDICATIONS:  albumin human 25% IVPB 100 milliLiter(s) IV Intermittent every 8 hours  dextrose 10% + sodium chloride 0.9%. 1000 milliLiter(s) IV Continuous <Continuous>  heparin   Injectable 5000 Unit(s) SubCutaneous every 8 hours  influenza  Vaccine (HIGH DOSE) 0.7 milliLiter(s) IntraMuscular once  lactulose Syrup 10 Gram(s) Oral three times a day  lidocaine   4% Patch 1 Patch Transdermal daily PRN  octreotide  Infusion 50 MICROgram(s)/Hr IV Continuous <Continuous>  pantoprazole    Tablet 40 milliGRAM(s) Oral before breakfast    REVIEW OF SYSTEMS:  Negative except as above in HPI.    Vital Signs Last 24 Hrs  T(F): 97.6 (2024 07:40), Max: 97.8 (10 Apr 2024 16:09)  HR: 63 (2024 07:40) (53 - 63)  BP: 104/62 (2024 07:40) (91/55 - 106/64)  RR: 17 (2024 07:40) (17 - 18)  SpO2: 96% (2024 07:40) (96% - 100%)  I&O's Summary    PHYSICAL EXAM:  GENERAL: NAD, laying in bed comfortably  EYES: b/l scleral icterus  CHEST/LUNG: on RA, no increased work of breathing  ABDOMEN: distended    LABS:                        10.4   5.61  )-----------( 108      ( 2024 06:58 )             29.9     04-11    129  |  95  |  52  ----------------------------<  71  4.0   |  16  |  2.20    Ca    8.0      2024 06:58  Phos  5.1     -11  Mg     2.30     -11    TPro  5.3  /  Alb  3.2  /  TBili  35.3  /  DBili  x   /  AST  179  /  ALT  77  /  AlkPhos  353  04-11  PT/INR - ( 10 Apr 2024 19:38 )   PT: 17.2 sec;   INR: 1.54 ratio      RADIOLOGY & ADDITIONAL TESTS:  < from: CT Abdomen and Pelvis No Cont (04.10.24 @ 13:43) >    FINDINGS:  LOWER CHEST: Small right pleural effusion.    LIVER: Cirrhosis. Large right hepatic lobe heterogeneous hypoattenuating   lesion measuring 1.1 x 7.7 cm, consistent with history of hepatocellular   carcinoma.  BILE DUCTS: Normal caliber.  GALLBLADDER: Cholelithiasis.  SPLEEN: Within normal limits.  PANCREAS: Within normal limits.  ADRENALS: Within normal limits.  KIDNEYS/URETERS: Within normal limits.    BLADDER: Underdistended.  REPRODUCTIVE ORGANS: Uterus and adnexa within normal limits.    BOWEL: No bowel obstruction. Moderate edematous mural thickening of the   stomach. Diffuse mural thickening of the colon, most pronounced in the   ascending colon, suggestive of portal hypertensive colopathy. Normal   appendix  PERITONEUM: Small ascites.  VESSELS: Dilated main portal vein measuring up to 3.1 cm. Perisplenic and   perigastric varices. Atherosclerotic changes.  RETROPERITONEUM/LYMPH NODES: No lymphadenopathy.  ABDOMINAL WALL: Mild anasarca.  BONES: Degenerative changes.    IMPRESSION:  Cirrhosis with portal hypertension.  Large right hepaticlobe heterogeneous hypoattenuating lesion measuring   1.1 x 7.7 cm, consistent with history of hepatocellular carcinoma.  Dilated main portal vein measuring up to 3.1 cm. Differentials include   bland versus tumor thrombosis. Addendum can be issued with comparison   with prior studies if available.    < end of copied text >

## 2024-04-11 NOTE — PROGRESS NOTE ADULT - ATTENDING COMMENTS
Patient seen and examined with team and family at bedside  Agree with above A/P by Dr Amos  74-year-old female with past medical history of recently diagnosed hepatic carcinoma, DM2, HTN, HLD, history of hepatitis C s/p treatment C/B cirrhosis presenting to the ER with weakness, chest tightness and shortness of breath. The patient initially was in [facility] and went to the hospital due to  increasing ascites and dyspnea. 10L fluids were removed. States long-standing hx of Hep C and complicated by cirrhosis. Patient followed up with hepatology at [facility] and was sent directly to ED given clinical symptoms. The patient was admitted to [facility] for about  a month  and was diagnosed with HCC at that time. No Bx was done at the time. No treatment was offered by oncology team at [facility] and was managed palliatively and discharged with home hospice. Over past few days the patient developed reduced appetite, generalized fatigue. Last week she had paracentesis which removed 2.5L ascitic fluid. This AM she developed chest tightness and shortness of breath.  Reports abdominal discomfort at her baseline.    Patient overnight had ivf, d10ns, iv albumin .    family has tried to get multiple second opinions. family is interesting in liver dialysis    PE, Very pleasant lady and family   Vital Signs Last 24 Hrs  T(C): 36.3 (11 Apr 2024 11:30), Max: 36.6 (10 Apr 2024 16:09)  T(F): 97.4HR: 61 BP: 115/57 , BP(mean): 72 RR: 17 SpO2: 100% room air   Patient looks comfortable, no pain  HEENT, very jaundiced eyes  Lungs CTA anteriorly, COR  rrr, no m/r/g, , Abd distended but soft n/t, ,Ext neg e/c positive b/l pitting edema to calves. 2 plus, skin jaundiced                           10.4   5.61  )-----------( 108      ( 11 Apr 2024 06:58 )             29.9   04-11    129<L>  |  95<L>  |  52<H>  ----------------------------<  71  4.0   |  16<L>  |  2.20<H>    Ca    8.0<L>      11 Apr 2024 06:58  Phos  5.1     04-11  Mg     2.30     04-11    TPro  5.3<L>  /  Alb  3.2<L>  /  TBili  35.3<H>  /  DBili  x   /  AST  179<H>  /  ALT  77<H>  /  AlkPhos  353<H>  04-11      rad< from: CT Abdomen and Pelvis No Cont (04.10.24 @ 13:43) >  IMPRESSION:  Cirrhosis with portal hypertension. Large right hepaticlobe heterogeneous hypoattenuating lesion measuring   1.1 x 7.7 cm, consistent with history of hepatocellular carcinoma.  Dilated main portal vein measuring up to 3.1 cm. Differentials include   bland versus tumor thrombosis. Addendum can be issued with comparison   with prior studies if available.    A/P  FFT/ Hepatocellular Ca with hyperbilirubinemia w/o evidence of obstruction on Ct, hypoglycemia sec to Liver abn and poor po intake, hyponatremia, hypotension on admission most likely sec to hepatorenal syndrome with elevated creatinine.  # Onc , Family wants second opinion. [facility] Onc team consult requested  # Hyponatremia, multifactorial . improved with d10ns, c/w d10ns and start ensure tid po  Do not correct faster than 8 meq in 24 hrs.n obstructive process . advanced GI consult requested. any role for ERCP.  Hepatology consult also requested,.  # Hyperbilirubinemia, tbili 34 , d beverly >20, indicates a  # FFT diet c/w with salt , veg and fish and add ensure tid to diet  # Hyperbili and inc LFT---> GI/ Hepatology eval.  # Hypotension. improved with ivf and albumin . will send AM cortisol. tsh <0.1. Endocrine consult to help with abnormal TSH  # Epigastric pain with eating.  most likely gerd, PPI, keep HOB>30 deg  #creat 2.2, iv octreotide for hepatorenal syndrome   # Dvt proph hep sq  #[facility] HCP form completed Son Neptali Stewart [phone], #2 is daughter Andrews Stewart [phone]  Patient is FULL CODE. stable but guarded prognosis, daughter and son updated. await input from oncology/ hepatology/ advanced gi and endocrine.  Plan d/w patient/ family / team / CM/ALESHIA

## 2024-04-11 NOTE — CONSULT NOTE ADULT - ASSESSMENT
74-year-old female with HCV-related cirrhosis c/b ascites, esophageal varices and recently diagnosed hepatocellular carcinoma (12/2023), DM2, HTN and HLD who presented to the ER with weakness, chest tightness and shortness of breath admitted for further work up. Oncology consulted at family's request for second opinion as patient deemed not a candidate for disease-modifying treatment at [facility], where she was diagnosed.     #Hepatocellular carcinoma  #Ascites  #Tumor thrombus  - Patient with HCC as per radiographic and labs findings, no tissue biopsy obtained  - [facility] records reviewed, had elevated AFP of 252 on 3/15/24  - Had large volume, s/p paracentesis x2 (10L in January in [facility], 2.5L 4/4 @ [facility])  >> Management as per hepatology  - Non-contrast CT with Large right hepatic lobe heterogeneous hypoattenuating lesion measuring 1.1 x 7.7 cm, consistent with history of hepatocellular carcinoma. 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  Thank you for the consult. Oncology to sign off at this time, but please feel free to reconsult as needed.    **Please be aware note/recommendations not finalized until attending addendum complete.**    Gloria Valentine DO, MPH  Medical Oncology Fellow, PGY-8  *Can be reached via Teams, but please contact the on-call fellow after 5pm-8am on weekdays and on weekends.

## 2024-04-11 NOTE — CONSULT NOTE ADULT - SUBJECTIVE AND OBJECTIVE BOX
Chief Complaint:  Weakness and SOB    HPI:    Ms. Trevino is a 74-year-old female with HCV-related cirrhosis c/b ascites, esophageal varices and recently diagnosed hepatocellular carcinoma, DM2, HTN and HLD who presented to the ER with weakness, chest tightness and shortness of breath.     Patient was diagnosed with Hepatitis C in the early 2000s and treated with several rounds of interferon followed by [facility] with sustained virologic response. She was diagnosed with cirrhosis, however compensated, and followed at [facility]. In December, patient traveled to [facility] where she was diagnosed with ascites and started on diuretics. She then traveled to [place] in January at which time she was hospitalized for increasing ascites and dyspnea s/p paracentesis with 10L fluid removal. Upon return to [place] in March, patient followed up with hepatology at [facility] and was sent directly to ED due to jaundice. The patient was admitted to [facility] for about a month and was diagnosed with HCC based on CT and MRI imaging. No treatment was offered by the oncology team at [facility] and she was managed palliatively, ultimately discharged with home hospice. Last week she had a second paracentesis with removal of 2.5L ascitic fluid due to dyspnea. Due to recurrent chest tightness and shortness of breath, she presented to [facility] ED.  Per daughter, patient has been fatigued with generalized weakness and loss of muscle mass over the past year. However, she has continued to be active, ambulatory and independent of ADLs. Denies fever/chills, palpitations, syncope, vomiting, diarrhea, hematemesis, confusion or melena. Patient had EGDs performed at [facility] and in [facility] with identification of varices without banding, for which she was placed on Coreg. She was started on lactulose for constipation without history of confusion. At present moving her bowels at least 1-2 times daily.     Upon arrival, patient with low blood pressures and transient bradycardia, otherwise afebrile and oxygenating adequately on ambient air. CT A/P without contrast was performed with a large right hepaticlobe heterogeneous hypoattenuating lesion measuring 1.1 x 7.7 cm, consistent with history of hepatocellular carcinoma, as well as dilated main portal vein measuring up to 3.1 cm.      Allergies:  penicillin (Other)    Hospital Medications:  albumin human 25% IVPB 100 milliLiter(s) IV Intermittent every 8 hours  dextrose 10% + sodium chloride 0.9%. 1000 milliLiter(s) IV Continuous <Continuous>  heparin   Injectable 5000 Unit(s) SubCutaneous every 8 hours  influenza  Vaccine (HIGH DOSE) 0.7 milliLiter(s) IntraMuscular once  lactulose Syrup 10 Gram(s) Oral three times a day  lidocaine   4% Patch 1 Patch Transdermal daily PRN  octreotide  Infusion 50 MICROgram(s)/Hr IV Continuous <Continuous>  pantoprazole    Tablet 40 milliGRAM(s) Oral before breakfast      PMHX/PSHX:  Diabetes    Hypertension    Hepatitis-C    Liver cirrhosis    Hepatocellular carcinoma    No significant past surgical history    Family history:      Social History: no smoking    ROS:   See HPI    PHYSICAL EXAM:   GENERAL:  NAD, resting comfortably in bed  HEENT:  Sclera icteric  CHEST:  Normal effort  HEART:  HDS  ABDOMEN:  firm, distended, non-tender  EXTREMITIES:  1+ bipedal edema  SKIN:  Warm & Dry.   NEURO:  Alert, conversant, oriented x3, no asterixis or focal deficit    Vital Signs:  Vital Signs Last 24 Hrs  T(C): 36.4 (11 Apr 2024 07:40), Max: 36.6 (10 Apr 2024 16:09)  T(F): 97.6 (11 Apr 2024 07:40), Max: 97.8 (10 Apr 2024 16:09)  HR: 63 (11 Apr 2024 07:40) (53 - 63)  BP: 104/62 (11 Apr 2024 07:40) (82/46 - 106/64)  BP(mean): 72 (10 Apr 2024 19:35) (58 - 72)  RR: 17 (11 Apr 2024 07:40) (17 - 18)  SpO2: 96% (11 Apr 2024 07:40) (96% - 100%)    Parameters below as of 11 Apr 2024 07:40  Patient On (Oxygen Delivery Method): room air       Daily     LABS:                        10.4   5.61  )-----------( 108      ( 11 Apr 2024 06:58 )             29.9     Mean Cell Volume: 96.8 fL (04-11-24 @ 06:58)    04-11    129<L>  |  95<L>  |  52<H>  ----------------------------<  71  4.0   |  16<L>  |  2.20<H>    Ca    8.0<L>      11 Apr 2024 06:58  Phos  5.1     04-11  Mg     2.30     04-11    TPro  5.3<L>  /  Alb  3.2<L>  /  TBili  35.3<H>  /  DBili  x   /  AST  179<H>  /  ALT  77<H>  /  AlkPhos  353<H>  04-11    LIVER FUNCTIONS - ( 11 Apr 2024 06:58 )  Alb: 3.2 g/dL / Pro: 5.3 g/dL / ALK PHOS: 353 U/L / ALT: 77 U/L / AST: 179 U/L / GGT: x           PT/INR - ( 10 Apr 2024 19:38 )   PT: 17.2 sec;   INR: 1.54 ratio           Urinalysis Basic - ( 11 Apr 2024 06:58 )    Color: x / Appearance: x / SG: x / pH: x  Gluc: 71 mg/dL / Ketone: x  / Bili: x / Urobili: x   Blood: x / Protein: x / Nitrite: x   Leuk Esterase: x / RBC: x / WBC x   Sq Epi: x / Non Sq Epi: x / Bacteria: x                              10.4   5.61  )-----------( 108      ( 11 Apr 2024 06:58 )             29.9                         12.1   7.05  )-----------( 152      ( 10 Apr 2024 19:38 )             35.6                         12.5   9.28  )-----------( 174      ( 10 Apr 2024 09:23 )             35.5     Imaging:    < from: CT Abdomen and Pelvis No Cont (04.10.24 @ 13:43) >  LIVER: Cirrhosis. Large right hepatic lobe heterogeneous hypoattenuating   lesion measuring 1.1 x 7.7 cm, consistent with history of hepatocellular   carcinoma.  BILE DUCTS: Normal caliber.  GALLBLADDER: Cholelithiasis.  SPLEEN: Within normal limits.  PANCREAS: Within normal limits.  ADRENALS: Within normal limits.  KIDNEYS/URETERS: Within normal limits.    BLADDER: Underdistended.  REPRODUCTIVE ORGANS: Uterus and adnexa within normal limits.    BOWEL: No bowel obstruction. Moderate edematous mural thickening of the   stomach. Diffuse mural thickening of the colon, most pronounced in the   ascending colon, suggestive of portal hypertensive colopathy. Normal   appendix  PERITONEUM: Small ascites.  VESSELS: Dilated main portal vein measuring up to 3.1 cm. Perisplenic and   perigastric varices. Atherosclerotic changes.  RETROPERITONEUM/LYMPH NODES: No lymphadenopathy.  ABDOMINAL WALL: Mild anasarca.  BONES: Degenerative changes.    IMPRESSION:  Cirrhosis with portal hypertension.  Large right hepaticlobe heterogeneous hypoattenuating lesion measuring   1.1 x 7.7 cm, consistent with history of hepatocellular carcinoma.  Dilated main portal vein measuring up to 3.1 cm. Differentials include   bland versus tumor thrombosis. Addendum can be issued with comparison   with prior studies if available.    < end of copied text >

## 2024-04-11 NOTE — CONSULT NOTE ADULT - ASSESSMENT
74-year-old female with past medical history of recently diagnosed hepatic carcinoma, DM2, HTN, HLD, history of hepatitis C s/p treatment C/B cirrhosis presenting to the ER with weakness, chest tightness and shortness of breath. The patient initially was in [facility] and went to the hospital due to  increasing ascites and dyspnea. 10L fluids were removed. States long-standing hx of Hep C and complicated by cirrhosis. PAtient followed up with hepatology at [facility] and was sent directly to ED given clinical symptoms. The patient was admitted to [facility] for about  a month  and was diagnosed with HCC at that time. No Bx was done at the time. No treatment was offered by oncology team at [facility] and was managed palliatively and discharged with home hospice. Over past few days the patient developed reduced appetite, generalized fatigue. Last week she had paracentesis which removed 2.5L ascitic fluid. This AM she developed chest tightness and shortness of breath.  Reports abdominal discomfort at her baseline.  Denies fever/chills, palpitations, syncope, vomiting, diarrhea, black or bloody stool or any other concerns. [facility] team consulted for GOC.

## 2024-04-11 NOTE — PROGRESS NOTE ADULT - SUBJECTIVE AND OBJECTIVE BOX
Progress Note  Authored by:   Juan A Amos MD   PGY-1 Internal Medicine    04-10-24 (1d)    Patient is a 74y old  Female who presents with a chief complaint of Weakness and SOB (10 Apr 2024 16:07)      Subjective / Overnight Events :  - Hypoglycemic overnight, improved BP   - Pt seen and examined at bedside.     Additional ROS (if any):    MEDICATIONS  (STANDING):  albumin human 25% IVPB 100 milliLiter(s) IV Intermittent every 8 hours  dextrose 10% + sodium chloride 0.9%. 1000 milliLiter(s) (30 mL/Hr) IV Continuous <Continuous>  heparin   Injectable 5000 Unit(s) SubCutaneous every 8 hours  influenza  Vaccine (HIGH DOSE) 0.7 milliLiter(s) IntraMuscular once  lactulose Syrup 10 Gram(s) Oral three times a day  octreotide  Infusion 50 MICROgram(s)/Hr (10 mL/Hr) IV Continuous <Continuous>  pantoprazole    Tablet 40 milliGRAM(s) Oral before breakfast    MEDICATIONS  (PRN):  lidocaine   4% Patch 1 Patch Transdermal daily PRN back Pain          PHYSICAL EXAM:  Vital Signs Last 24 Hrs  T(C): 36.6 (11 Apr 2024 06:42), Max: 36.6 (10 Apr 2024 16:09)  T(F): 97.8 (11 Apr 2024 06:42), Max: 97.8 (10 Apr 2024 16:09)  HR: 63 (11 Apr 2024 06:42) (49 - 63)  BP: 92/52 (11 Apr 2024 06:51) (82/46 - 106/64)  BP(mean): 72 (10 Apr 2024 19:35) (58 - 72)  RR: 18 (11 Apr 2024 06:42) (17 - 18)  SpO2: 96% (11 Apr 2024 06:42) (96% - 100%)    Parameters below as of 11 Apr 2024 06:42  Patient On (Oxygen Delivery Method): room air        I&O's Summary      General: NAD, non-toxic appearing   HEENT: EOMi, scleral icterus  CV: RRR, normal S1 and S2, no m/r/g  Lungs: normal respiratory effort. CTAB, no wheezes, rales, or rhonchi  Abd: soft nontender, distended significant ascites   Ext: no edema, 2+ peripheral pulses   Pysch: AOx4, appropriate affect   Neuro: grossly non-focal, moving all extremities spontaneously   Skin: no rashes or lesions, jaundice     LABS:  CAPILLARY BLOOD GLUCOSE      POCT Blood Glucose.: 101 mg/dL (11 Apr 2024 02:38)  POCT Blood Glucose.: 51 mg/dL (11 Apr 2024 02:34)  POCT Blood Glucose.: 62 mg/dL (11 Apr 2024 02:10)  POCT Blood Glucose.: 60 mg/dL (11 Apr 2024 02:06)  POCT Blood Glucose.: 73 mg/dL (11 Apr 2024 00:15)  POCT Blood Glucose.: 82 mg/dL (10 Apr 2024 19:08)  POCT Blood Glucose.: 111 mg/dL (10 Apr 2024 18:01)  POCT Blood Glucose.: 64 mg/dL (10 Apr 2024 17:46)  POCT Blood Glucose.: 118 mg/dL (10 Apr 2024 11:20)  POCT Blood Glucose.: 140 mg/dL (10 Apr 2024 09:57)  POCT Blood Glucose.: 144 mg/dL (10 Apr 2024 09:31)  POCT Blood Glucose.: 54 mg/dL (10 Apr 2024 09:17)  POCT Blood Glucose.: 47 mg/dL (10 Apr 2024 08:58)                              12.1   7.05  )-----------( 152      ( 10 Apr 2024 19:38 )             35.6       WBC Trend: 7.05<--, 9.28<--  Hb Trend: 12.1<--, 12.5<--    04-11    129<L>  |  95<L>  |  52<H>  ----------------------------<  50<LL>  4.3   |  16<L>  |  2.18<H>    Ca    7.8<L>      11 Apr 2024 00:55  Phos  5.0     04-10  Mg     2.40     04-10    TPro  x   /  Alb  3.0<L>  /  TBili  x   /  DBili  x   /  AST  x   /  ALT  x   /  AlkPhos  x   04-11    PT/INR - ( 10 Apr 2024 19:38 )   PT: 17.2 sec;   INR: 1.54 ratio               Urinalysis Basic - ( 11 Apr 2024 00:55 )    Color: x / Appearance: x / SG: x / pH: x  Gluc: 50 mg/dL / Ketone: x  / Bili: x / Urobili: x   Blood: x / Protein: x / Nitrite: x   Leuk Esterase: x / RBC: x / WBC x   Sq Epi: x / Non Sq Epi: x / Bacteria: x        Blood Gas Venous Comprehensive Result: DONE (04-10-24 @ 09:23)      RADIOLOGY & ADDITIONAL TESTS: Reviewed     Progress Note  Authored by:   Juan A Amso MD   PGY-1 Internal Medicine    04-10-24 (1d)    Patient is a 74y old  Female who presents with a chief complaint of Weakness and SOB (10 Apr 2024 16:07)      Subjective / Overnight Events :  - Hypoglycemic overnight, improved BP   - Pt seen and examined at bedside.     Additional ROS (if any):    MEDICATIONS  (STANDING):  albumin human 25% IVPB 100 milliLiter(s) IV Intermittent every 8 hours  dextrose 10% + sodium chloride 0.9%. 1000 milliLiter(s) (30 mL/Hr) IV Continuous <Continuous>  heparin   Injectable 5000 Unit(s) SubCutaneous every 8 hours  influenza  Vaccine (HIGH DOSE) 0.7 milliLiter(s) IntraMuscular once  lactulose Syrup 10 Gram(s) Oral three times a day  octreotide  Infusion 50 MICROgram(s)/Hr (10 mL/Hr) IV Continuous <Continuous>  pantoprazole    Tablet 40 milliGRAM(s) Oral before breakfast    MEDICATIONS  (PRN):  lidocaine   4% Patch 1 Patch Transdermal daily PRN back Pain          PHYSICAL EXAM:  Vital Signs Last 24 Hrs  T(C): 36.6 (11 Apr 2024 06:42), Max: 36.6 (10 Apr 2024 16:09)  T(F): 97.8 (11 Apr 2024 06:42), Max: 97.8 (10 Apr 2024 16:09)  HR: 63 (11 Apr 2024 06:42) (49 - 63)  BP: 92/52 (11 Apr 2024 06:51) (82/46 - 106/64)  BP(mean): 72 (10 Apr 2024 19:35) (58 - 72)  RR: 18 (11 Apr 2024 06:42) (17 - 18)  SpO2: 96% (11 Apr 2024 06:42) (96% - 100%)    Parameters below as of 11 Apr 2024 06:42  Patient On (Oxygen Delivery Method): room air        I&O's Summary      General: NAD, non-toxic appearing   HEENT: EOMi, scleral icterus  CV: RRR, normal S1 and S2, no m/r/g  Lungs: normal respiratory effort. CTAB, no wheezes, rales, or rhonchi  Abd: soft nontender, distended significant ascites   Ext: significant 2+ pitting edema, 2+ peripheral pulses   Pysch: AOx4, appropriate affect   Neuro: grossly non-focal, moving all extremities spontaneously   Skin: no rashes or lesions, jaundice     LABS:  CAPILLARY BLOOD GLUCOSE      POCT Blood Glucose.: 101 mg/dL (11 Apr 2024 02:38)  POCT Blood Glucose.: 51 mg/dL (11 Apr 2024 02:34)  POCT Blood Glucose.: 62 mg/dL (11 Apr 2024 02:10)  POCT Blood Glucose.: 60 mg/dL (11 Apr 2024 02:06)  POCT Blood Glucose.: 73 mg/dL (11 Apr 2024 00:15)  POCT Blood Glucose.: 82 mg/dL (10 Apr 2024 19:08)  POCT Blood Glucose.: 111 mg/dL (10 Apr 2024 18:01)  POCT Blood Glucose.: 64 mg/dL (10 Apr 2024 17:46)  POCT Blood Glucose.: 118 mg/dL (10 Apr 2024 11:20)  POCT Blood Glucose.: 140 mg/dL (10 Apr 2024 09:57)  POCT Blood Glucose.: 144 mg/dL (10 Apr 2024 09:31)  POCT Blood Glucose.: 54 mg/dL (10 Apr 2024 09:17)  POCT Blood Glucose.: 47 mg/dL (10 Apr 2024 08:58)                              12.1   7.05  )-----------( 152      ( 10 Apr 2024 19:38 )             35.6       WBC Trend: 7.05<--, 9.28<--  Hb Trend: 12.1<--, 12.5<--    04-11    129<L>  |  95<L>  |  52<H>  ----------------------------<  50<LL>  4.3   |  16<L>  |  2.18<H>    Ca    7.8<L>      11 Apr 2024 00:55  Phos  5.0     04-10  Mg     2.40     04-10    TPro  x   /  Alb  3.0<L>  /  TBili  x   /  DBili  x   /  AST  x   /  ALT  x   /  AlkPhos  x   04-11    PT/INR - ( 10 Apr 2024 19:38 )   PT: 17.2 sec;   INR: 1.54 ratio               Urinalysis Basic - ( 11 Apr 2024 00:55 )    Color: x / Appearance: x / SG: x / pH: x  Gluc: 50 mg/dL / Ketone: x  / Bili: x / Urobili: x   Blood: x / Protein: x / Nitrite: x   Leuk Esterase: x / RBC: x / WBC x   Sq Epi: x / Non Sq Epi: x / Bacteria: x        Blood Gas Venous Comprehensive Result: DONE (04-10-24 @ 09:23)      RADIOLOGY & ADDITIONAL TESTS: Reviewed

## 2024-04-11 NOTE — PROGRESS NOTE ADULT - PROBLEM SELECTOR PLAN 5
unknown baseline; possibly hepatorenal     -Avoid nephrotoxic agents   -c/w octreotide unknown baseline; possibly hepatorenal     -Avoid nephrotoxic agents   -c/w octreotide 50

## 2024-04-11 NOTE — CONSULT NOTE ADULT - SUBJECTIVE AND OBJECTIVE BOX
HPI:  74-year-old female with past medical history of recently diagnosed hepatic carcinoma, DM2, HTN, HLD, history of hepatitis C s/p treatment C/B cirrhosis presenting to the ER with weakness, chest tightness and shortness of breath. The patient initially was in [facility] and went to the hospital due to  increasing ascites and dyspnea. 10L fluids were removed. States long-standing hx of Hep C and complicated by cirrhosis. PAtient followed up with hepatology at [facility] and was sent directly to ED given clinical symptoms. The patient was admitted to [facility] for about  a month  and was diagnosed with HCC at that time. No Bx was done at the time. No treatment was offered by oncology team at [facility] and was managed palliatively and discharged with home hospice. Over past few days the patient developed reduced appetite, generalized fatigue. Last week she had paracentesis which removed 2.5L ascitic fluid. This AM she developed chest tightness and shortness of breath.  Reports abdominal discomfort at her baseline.  Denies fever/chills, palpitations, syncope, vomiting, diarrhea, black or bloody stool or any other concerns.     AT [facility] ED the patient vitals were significant for BP of 83/47, pulse in 50s, saturating 99% RA.  (10 Apr 2024 16:07)      ENDOCRINE HX:  Patient hypotensive (BP 68/48), bradycardic (HR 44), hypoglycemia (glucose 35 on BMP), and hyponatremic (Na 125) on this admission. Temperature normal. Potassium normal.     History obtained at bedside from patient and family. No personal or known family history of adrenal or thyroid issues. CT A/P this admission with normal adrenal glands.     She has a hx of T2DM and takes glimepiride 2mg BID and farxiga 5mg daily. She tests her glucose 1-2x per day at home. It has been low recently, with glucose in the 50-60s in the past few days prior to coming to the hospital.     PAST MEDICAL & SURGICAL HISTORY:  Diabetes      Hypertension      Hepatitis-C      Liver cirrhosis      Hepatocellular carcinoma      No significant past surgical history          FAMILY HISTORY:      Social History:    Outpatient Medications:    MEDICATIONS  (STANDING):  albumin human 25% IVPB 100 milliLiter(s) IV Intermittent every 8 hours  cefTRIAXone   IVPB 1000 milliGRAM(s) IV Intermittent every 24 hours  heparin   Injectable 5000 Unit(s) SubCutaneous every 8 hours  influenza  Vaccine (HIGH DOSE) 0.7 milliLiter(s) IntraMuscular once  lactulose Syrup 10 Gram(s) Oral three times a day  octreotide  Infusion 50 MICROgram(s)/Hr (10 mL/Hr) IV Continuous <Continuous>  pantoprazole    Tablet 40 milliGRAM(s) Oral before breakfast  petrolatum white Ointment 1 Application(s) Topical every 6 hours    MEDICATIONS  (PRN):  lidocaine   4% Patch 1 Patch Transdermal daily PRN back Pain      Allergies    penicillin (Other)    Intolerances      Review of Systems:  Constitutional: No fever  Eyes: No blurry vision  Neuro: No tremors  HEENT: No pain  Cardiovascular: No chest pain, palpitations  Respiratory: No SOB, no cough  GI: No nausea, vomiting, abdominal pain  : No dysuria  Skin: no rash  Psych: no depression  Endocrine: no polyuria, polydipsia  Hem/lymph: no swelling  Osteoporosis: no fractures    ALL OTHER SYSTEMS REVIEWED AND NEGATIVE    PHYSICAL EXAM:  VITALS: T(C): 36.3 (04-11-24 @ 11:30)  T(F): 97.4 (04-11-24 @ 11:30), Max: 97.8 (04-10-24 @ 16:09)  HR: 61 (04-11-24 @ 11:30) (53 - 63)  BP: 115/57 (04-11-24 @ 11:30) (92/52 - 115/57)  RR:  (17 - 18)  SpO2:  (96% - 100%)  Wt(kg): --  GENERAL: NAD  EYES: marked scleral icterus  HEENT:  Atraumatic, Normocephalic, moist mucous membranes  RESPIRATORY: Normal respiratory effort; no audible wheezing  SKIN: Dry, intact, No rashes or lesions  MUSCULOSKELETAL: Full range of motion, normal strength  PSYCH: Alert and oriented x 3, normal affect, normal mood  CUSHING'S SIGNS: no striae      CAPILLARY BLOOD GLUCOSE      POCT Blood Glucose.: 115 mg/dL (11 Apr 2024 12:37)  POCT Blood Glucose.: 78 mg/dL (11 Apr 2024 09:08)  POCT Blood Glucose.: 101 mg/dL (11 Apr 2024 02:38)  POCT Blood Glucose.: 51 mg/dL (11 Apr 2024 02:34)  POCT Blood Glucose.: 62 mg/dL (11 Apr 2024 02:10)  POCT Blood Glucose.: 60 mg/dL (11 Apr 2024 02:06)  POCT Blood Glucose.: 73 mg/dL (11 Apr 2024 00:15)  POCT Blood Glucose.: 82 mg/dL (10 Apr 2024 19:08)  POCT Blood Glucose.: 111 mg/dL (10 Apr 2024 18:01)  POCT Blood Glucose.: 64 mg/dL (10 Apr 2024 17:46)                            10.4   5.61  )-----------( 108      ( 11 Apr 2024 06:58 )             29.9       04-11    129<L>  |  95<L>  |  52<H>  ----------------------------<  71  4.0   |  16<L>  |  2.20<H>    eGFR: 23<L>    Ca    8.0<L>      04-11  Mg     2.30     04-11  Phos  5.1     04-11    TPro  5.3<L>  /  Alb  3.2<L>  /  TBili  35.3<H>  /  DBili  x   /  AST  179<H>  /  ALT  77<H>  /  AlkPhos  353<H>  04-11      Thyroid Function Tests:  04-11 @ 06:58 TSH -- FreeT4 1.2 T3 60 Anti TPO -- Anti Thyroglobulin Ab -- TSI --  04-10 @ 09:23 TSH <0.10 FreeT4 -- T3 -- Anti TPO -- Anti Thyroglobulin Ab -- TSI --              Radiology:                HPI:  74-year-old female with past medical history of recently diagnosed hepatic carcinoma, DM2, HTN, HLD, history of hepatitis C s/p treatment C/B cirrhosis presenting to the ER with weakness, chest tightness and shortness of breath. The patient initially was in [facility] and went to the hospital due to  increasing ascites and dyspnea. 10L fluids were removed. States long-standing hx of Hep C and complicated by cirrhosis. PAtient followed up with hepatology at [facility] and was sent directly to ED given clinical symptoms. The patient was admitted to [facility] for about  a month  and was diagnosed with HCC at that time. No Bx was done at the time. No treatment was offered by oncology team at [facility] and was managed palliatively and discharged with home hospice. Over past few days the patient developed reduced appetite, generalized fatigue. Last week she had paracentesis which removed 2.5L ascitic fluid. This AM she developed chest tightness and shortness of breath.  Reports abdominal discomfort at her baseline.  Denies fever/chills, palpitations, syncope, vomiting, diarrhea, black or bloody stool or any other concerns.     AT [facility] ED the patient vitals were significant for BP of 83/47, pulse in 50s, saturating 99% RA.  (10 Apr 2024 16:07)      ENDOCRINE HX:  Patient hypotensive (BP 68/48), bradycardic (HR 44), hypoglycemia (glucose 35 on BMP), and hyponatremic (Na 125) on this admission. Temperature normal. Potassium normal.     History obtained at bedside from patient and family. No personal or known family history of adrenal or thyroid issues. CT A/P this admission with normal adrenal glands.     She has a hx of T2DM and takes glimepiride 2mg BID and farxiga 5mg daily. She tests her glucose 1-2x per day at home. It has been low recently, with glucose in the 50-60s in the past few days prior to coming to the hospital.     PAST MEDICAL & SURGICAL HISTORY:  Diabetes      Hypertension      Hepatitis-C      Liver cirrhosis      Hepatocellular carcinoma      No significant past surgical history          FAMILY HISTORY:      Social History:    Outpatient Medications: glimepiride 2mg BID and farxiga 5mg daily.    MEDICATIONS  (STANDING):  albumin human 25% IVPB 100 milliLiter(s) IV Intermittent every 8 hours  cefTRIAXone   IVPB 1000 milliGRAM(s) IV Intermittent every 24 hours  heparin   Injectable 5000 Unit(s) SubCutaneous every 8 hours  influenza  Vaccine (HIGH DOSE) 0.7 milliLiter(s) IntraMuscular once  lactulose Syrup 10 Gram(s) Oral three times a day  octreotide  Infusion 50 MICROgram(s)/Hr (10 mL/Hr) IV Continuous <Continuous>  pantoprazole    Tablet 40 milliGRAM(s) Oral before breakfast  petrolatum white Ointment 1 Application(s) Topical every 6 hours    MEDICATIONS  (PRN):  lidocaine   4% Patch 1 Patch Transdermal daily PRN back Pain      Allergies    penicillin (Other)    Intolerances      Review of Systems:  Constitutional: No fever  Eyes: No blurry vision  Neuro: No tremors  HEENT: No pain  Cardiovascular: No chest pain, palpitations  Respiratory: No SOB, no cough  GI: No nausea, vomiting, abdominal pain  : No dysuria  Skin: no rash  Psych: no depression  Endocrine: no polyuria, polydipsia  Hem/lymph: no swelling  Osteoporosis: no fractures    ALL OTHER SYSTEMS REVIEWED AND NEGATIVE    PHYSICAL EXAM:  VITALS: T(C): 36.3 (04-11-24 @ 11:30)  T(F): 97.4 (04-11-24 @ 11:30), Max: 97.8 (04-10-24 @ 16:09)  HR: 61 (04-11-24 @ 11:30) (53 - 63)  BP: 115/57 (04-11-24 @ 11:30) (92/52 - 115/57)  RR:  (17 - 18)  SpO2:  (96% - 100%)  Wt(kg): --  GENERAL: NAD  EYES: marked scleral icterus  HEENT:  Atraumatic, Normocephalic, moist mucous membranes  RESPIRATORY: Normal respiratory effort; no audible wheezing  SKIN: Dry, intact, No rashes or lesions  MUSCULOSKELETAL: Full range of motion, normal strength  PSYCH: Alert and oriented x 3, normal affect, normal mood  CUSHING'S SIGNS: no striae      CAPILLARY BLOOD GLUCOSE      POCT Blood Glucose.: 115 mg/dL (11 Apr 2024 12:37)  POCT Blood Glucose.: 78 mg/dL (11 Apr 2024 09:08)  POCT Blood Glucose.: 101 mg/dL (11 Apr 2024 02:38)  POCT Blood Glucose.: 51 mg/dL (11 Apr 2024 02:34)  POCT Blood Glucose.: 62 mg/dL (11 Apr 2024 02:10)  POCT Blood Glucose.: 60 mg/dL (11 Apr 2024 02:06)  POCT Blood Glucose.: 73 mg/dL (11 Apr 2024 00:15)  POCT Blood Glucose.: 82 mg/dL (10 Apr 2024 19:08)  POCT Blood Glucose.: 111 mg/dL (10 Apr 2024 18:01)  POCT Blood Glucose.: 64 mg/dL (10 Apr 2024 17:46)                            10.4   5.61  )-----------( 108      ( 11 Apr 2024 06:58 )             29.9       04-11    129<L>  |  95<L>  |  52<H>  ----------------------------<  71  4.0   |  16<L>  |  2.20<H>    eGFR: 23<L>    Ca    8.0<L>      04-11  Mg     2.30     04-11  Phos  5.1     04-11    TPro  5.3<L>  /  Alb  3.2<L>  /  TBili  35.3<H>  /  DBili  x   /  AST  179<H>  /  ALT  77<H>  /  AlkPhos  353<H>  04-11      Thyroid Function Tests:  04-11 @ 06:58 TSH -- FreeT4 1.2 T3 60 Anti TPO -- Anti Thyroglobulin Ab -- TSI --  04-10 @ 09:23 TSH <0.10 FreeT4 -- T3 -- Anti TPO -- Anti Thyroglobulin Ab -- TSI --              Radiology:

## 2024-04-11 NOTE — PROGRESS NOTE ADULT - PROBLEM SELECTOR PLAN 1
- Dx in 3/2024, in OSH, no Bx performed, no Tx offered, sent home on Hospice   -  ALT 97 Tbili 34.9 on admission; Mixed elevated Direct (>20) and Indirect bili (13.8)  Family would like second opinion on treatment options by Oncology team     Plan:   Consulted [facility] for 2nd opinion   Consulted Hepatology  Palliative Consulted      - Trend AST/ALT   - Lactulose 10 BID titrate to 3-4 BM  - Octreotide 50 and CTX for SBP prophylaxis   - Hold home Spironolactone and Lasix iso hypotension - Dx in 3/2024, in OSH, no Bx performed, no Tx offered, sent home on Hospice   -  ALT 97 Tbili 34.9 on admission; Mixed elevated Direct (>20) and Indirect bili (13.8)  Family would like second opinion on treatment options by Oncology team   - Obtained Imaging records from [facility]   - Significant elevated >200 AFP on outpatient bloodwork     Plan:   Consulted [facility] for 2nd opinion   Consulted Hepatology  Palliative Consulted      - Trend AST/ALT   - Lactulose 10 BID titrate to 3-4 BM  - Octreotide 50 and CTX (4/11-) for SBP prophylaxis   - Hold home Spironolactone and Lasix iso hypotension  - Dx and Therapeutic para

## 2024-04-11 NOTE — PROGRESS NOTE ADULT - ASSESSMENT
74F w/ HCC 2/2 chronic Hep C s/p harvoni 2015, decomensated cirrhosis (ascites), CAD (stress test), T2DM, HTN, HLD, with recent admission to [facility] for worsening jaundice, found to have infiltrative HCC w/ extensive tumor thrombus initially discharged to home hospice presents to [facility] with new onset chest tightness as well as 1-2 weeks of worsening lethargy/failure to thrive.

## 2024-04-11 NOTE — CONSULT NOTE ADULT - ASSESSMENT
The patient is a 74y Female with PMH of recently diagnosed hepatic carcinoma, DM2, HTN, HLD, history of hepatitis C s/p treatment C/B cirrhosis presenting to the ER with weakness, chest tightness and shortness of breath.  Endocrinology consulted due to concern for AI.    incomplete note    #R/O Adrenal insufficiency  - Patient hypotensive (BP 68/48), bradycardic (HR 44), hypoglycemia (glucose 35 on BMP), and hyponatremic (Na 125) on this admission. Temperature normal. Potassium normal.   - No personal or known family history of adrenal or thyroid issues.   - CT A/P this admission with normal adrenal glands.   - Cirrhosis can be associated with a relative adrenal insufficiency, though treatment is controversial  PLAN  - monitor off steroids for now  - if patient becomes hemodynamically unstable can start stress dose steroids (hydrocortisone 50mg IV q8h)  - please check 8AM cortisol (order "Cortisol AM, serum" and make sure labs are drawn at 8AM, not 6AM morning labs. Do not order "free" or "ESO" cortisol)    #T2DM  #Hypoglycemia  - home regimen: glimepiride 2mg BID and farxiga 5mg daily.  - eGFR: 23 mL/min/1.73m2 (04-11-24)  - Weight (kg): 79.1 (04-11-24)  - glucose 35 on BMP this admission, now improved on dextrose    INPATIENT PLAN:  - monitor off insulin  - c/w dextrose-containing fluids as needed  - Please check FSG before meals and QHS, or q6h while NPO  - Inpatient glucose goals: 100-180  - check HgbA1c  - check beta-hydroxybutyrate   - Discharge recs pending clinical course: likely DC home meds    Donavon Witt MD  Endocrine Fellow  For nonurgent matters (including consults or followup questions), please email [email or web address] or [email or web address]. For urgent matters, please call answering service at [phone] (weekdays), [phone] (nights/weekends).    The patient is a 74y Female with PMH of recently diagnosed hepatic carcinoma, DM2, HTN, HLD, history of hepatitis C s/p treatment C/B cirrhosis presenting to the ER with weakness, chest tightness and shortness of breath.  Endocrinology consulted due to concern for AI.    #R/O Adrenal insufficiency  - Patient hypotensive (BP 68/48), bradycardic (HR 44), hypoglycemia (glucose 35 on BMP), and hyponatremic (Na 125) on this admission. Temperature normal. Potassium normal.   - No personal or known family history of adrenal or thyroid issues.   - CT A/P this admission with normal adrenal glands.   - Cirrhosis can be associated with a relative adrenal insufficiency, though treatment is controversial  PLAN  - monitor off steroids for now  - if patient becomes hemodynamically unstable can start stress dose steroids (hydrocortisone 50mg IV q8h)  - please check 8AM cortisol (order "Cortisol AM, serum" and make sure labs are drawn at 8AM, not 6AM morning labs. Do not order "free" or "ESO" cortisol)    #T2DM  #Hypoglycemia  - home regimen: glimepiride 2mg BID and farxiga 5mg daily.  - eGFR: 23 mL/min/1.73m2 (04-11-24)  - Weight (kg): 79.1 (04-11-24)  - HgbA1c <4%  - glucose 35 on BMP this admission, now improved on dextrose    INPATIENT PLAN:  - hypoglycemia may be related to glimepiride use given elevated c-peptide. Glimepiride can remain active for 72 hours. Would check sulfonylurea screen  - monitor off insulin  - c/w dextrose-containing fluids as needed  - Please check FSG before meals and QHS, or q6h while NPO  - Inpatient glucose goals: 100-180  - Discharge recs pending clinical course: likely DC home meds    Donavon Witt MD  Endocrine Fellow  For nonurgent matters (including consults or followup questions), please email [email or web address] or [email or web address]. For urgent matters, please call answering service at [phone] (weekdays), [phone] (nights/weekends).

## 2024-04-11 NOTE — CONSULT NOTE ADULT - TIME BILLING
Time spent for extensive review of the physical chart, electronic medical record, and documentation to obtain collateral information including but not limited to:  [x] Inpatient records (ED, H&P, primary team, and consultants if applicable, care coordination)  [x] Inpatient values/results (biomarkers, immunoassays, imaging, and microbiology results)  [x] Current or proposed treatment plans  [x] Pharmacotherapy review  [x] Discussion and coordinating care with primary team and interdisciplinary staff and floor staff  [x] Discussion including counseling/ education with the patient, surrogate decision maker, or family

## 2024-04-11 NOTE — CONSULT NOTE ADULT - ATTENDING COMMENTS
74-year-old woman with cirrhosis Adrian-Miguel C11, ECOG 4, HCC. Family aware that systemic options are not likely to benefit patient given her advanced liver disease. They are asking about holistic and alternate therapies, therefore we suggest a referral to our holistic medicine team. Meanwhile, we spend much time discussing their goals to ensure the patient is comfortable. Discussed hospice as an effective means of receiving additional resources to allow for the patient to be transitioned to a more comfortable environment.  Felipe Gilbert MD PhD  Oncology Attending
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 SBP  - severe hyponatremia Na 129  - MIGUELANGEL creat 2.2, reportedly recently normal; U-Na <20 - DD includes HRS-MIGUELANGEL, dehydration  - edema 1+  - HCC: 11 cm R lobe mass on non-contrast CT, reportedly tumor thrombus  # hypotension  # hypoglycemia - due to glipizide, poss. decrease food intake, can be caused by HCC and liver failure as well    End-stage liver disease, Child C11.   She is not a candidate for any HCC treatment modalities.     Plan:  - start empiric Zosyn - may have SBP, but ascites may be too small to drain  - MIGUELANGEL, hyponatremia: stop NaCl, give albumin 25%, 100 mL tid, hold diuretics, continue octreotide, midodrine  - AFP, f/u ID workup  - obtain labs and images from [place]. If not available today, obtain US abdomen to evaluate for bile duct dilatation  - agree with hospice - if she improves, will consider placement of abdom. catheter for ascites drainage.
74F HCC DM2 hypoglycemia on sulfonylurea. Encourage po intake, monitor off insulin. Also hypotensive bradycardia.  Will need to dc sulfonylurea. Check 8AM cortisol.  Complex patient high level decision making.    Lashell Bowles MD  Division of Endocrinology  Pager: [phone]    If after 6PM or before 9AM, or on weekends/holidays, please call endocrine answering service for assistance ([phone]).  For nonurgent matters email [email or web address] for assistance.
Patient was seen and evaluated with Dr. Sauer, Palliative Medicine Fellow, during bedside rounds.   Agree with above findings and recommendations, edited documentation as appropriate to reflect the plan of care discussed with patient and myself with the following additions:    74-year-old female with past medical history of recently diagnosed hepatic carcinoma, DM2, HTN, HLD, history of hepatitis C s/p treatment C/B cirrhosis presenting to the ER with weakness, chest tightness and shortness of breath.   Palliative Care consulted for complex decision making  related to goals of care discussions in the setting of advanced illness    Patient seen with her son Sharifa and cousin at bedside.   Introduced role of palliative care team for patient's care. Patient denied any pain, nausea/ emesis at this time, but did have nausea a few days earlier.   Patient and son share that patient recently diagnosed with hepatic cancer and awaiting second opinion for oncology recommendations. They she had not enrolled in hospice services after [facility] discharge.     - CT Abd/ Pelvis 4/10 - Cirrhosis with portal hypertension. Large right hepatic lobe heterogeneous hypoattenuating lesion measuring 1.1 x 7.7 cm, consistent with history of hepatocellular carcinoma. Dilated main portal vein measuring up to 3.1 cm.   - Hepatology recommendations appreciated - "not a candidate for any HCC treatment modalities. "     > Awaiting for patient/ family to speak to oncology for second opinion recommendations ; will follow up with patient / family afterwards for further goals of care discussions.     Patient discussed during oncology interdisciplinary rounds.   Thank you for allowing us to participate in your patient's care. Please page [phone] for any questions/concerns.

## 2024-04-11 NOTE — PROGRESS NOTE ADULT - PROBLEM SELECTOR PLAN 4
hypoglycemic to 50s on admission likely iso liver failure   Continues to have hypoglycemic episodes to 50s    - D10NS; D50 PRN for severe hypoglycemia   - Diet started   - CTM finger sticks w/ meals  - F/u TSH and AM cortisol eval hypoglycemia and bradycardia hypoglycemic to 50s on admission likely iso liver failure   Continues to have hypoglycemic episodes to 50s  Endocrine consulted  -Low TSH normal T4    - D50 PRN for severe hypoglycemia   - Diet started + Ensure 2x/day  - CTM finger sticks w/ meals  - AM cortisol eval hypoglycemia and bradycardia  - Nutrition consult

## 2024-04-11 NOTE — CONSULT NOTE ADULT - SUBJECTIVE AND OBJECTIVE BOX
Date of Service:04-11-24 @ 13:09  HPI:  74-year-old female with past medical history of recently diagnosed hepatic carcinoma, DM2, HTN, HLD, history of hepatitis C s/p treatment C/B cirrhosis presenting to the ER with weakness, chest tightness and shortness of breath. The patient initially was in [facility] and went to the hospital due to  increasing ascites and dyspnea. 10L fluids were removed. States long-standing hx of Hep C and complicated by cirrhosis. PAtient followed up with hepatology at [facility] and was sent directly to ED given clinical symptoms. The patient was admitted to [facility] for about  a month  and was diagnosed with HCC at that time. No Bx was done at the time. No treatment was offered by oncology team at [facility] and was managed palliatively and discharged with home hospice. Over past few days the patient developed reduced appetite, generalized fatigue. Last week she had paracentesis which removed 2.5L ascitic fluid. This AM she developed chest tightness and shortness of breath.  Reports abdominal discomfort at her baseline.  Denies fever/chills, palpitations, syncope, vomiting, diarrhea, black or bloody stool or any other concerns.     AT [facility] ED the patient vitals were significant for BP of 83/47, pulse in 50s, saturating 99% RA.  (10 Apr 2024 16:07)    PERTINENT PM/SXH:   Diabetes  Hypertension  Hepatitis-C  Liver cirrhosis  Hepatocellular carcinoma    No significant past surgical history    FAMILY HISTORY:    Family Hx substance abuse [ ]yes [ ]no  ITEMS NOT CHECKED ARE NOT PRESENT    SOCIAL HISTORY:   Significant other/partner[ ]  Children[ x]  Caodaism/Spirituality: Gnosticist  Substance hx:  [ ]   Tobacco hx:  [ ]   Alcohol hx: [ ]   Home Opioid hx:  [ ] I-Stop Reference No: [id number]  Living Situation: [ x]Home  [ ]Long term care  [ ]Rehab [ ]Other    Prescription Information      PDI Filter:    PDI	Current Rx	Drug Type	Rx Written	Rx Dispensed	Drug	Quantity	Days Supply	Prescriber Name	Prescriber ANNAMARIE #  A	N	O	10/24/2023	10/26/2023	hydrocodone-acetaminophen 5-325 mg tablet	6	3	Stacey Gongora	[id number]    ADVANCE DIRECTIVES:    DNR/MOLST  [ ]  Living Will  [ ]   DECISION MAKER(s):   [ ] Health Care Proxy(s)  [ ] Surrogate(s)  [ ] Guardian           Name(s): Phone Number(s):    BASELINE (I)ADL(s) (prior to admission):  [place]: [ ]Total  [ x] Moderate [ ]Dependent    Allergies  penicillin (Other)    Intolerances    MEDICATIONS  (STANDING):  albumin human 25% IVPB 100 milliLiter(s) IV Intermittent every 8 hours  cefTRIAXone   IVPB 1000 milliGRAM(s) IV Intermittent every 24 hours  heparin   Injectable 5000 Unit(s) SubCutaneous every 8 hours  influenza  Vaccine (HIGH DOSE) 0.7 milliLiter(s) IntraMuscular once  lactulose Syrup 10 Gram(s) Oral three times a day  octreotide  Infusion 50 MICROgram(s)/Hr (10 mL/Hr) IV Continuous <Continuous>  pantoprazole    Tablet 40 milliGRAM(s) Oral before breakfast    MEDICATIONS  (PRN):  lidocaine   4% Patch 1 Patch Transdermal daily PRN back Pain    PRESENT SYMPTOMS: [ ]Unable to self-report  [ ] CPOT [ ] PAINADs [ ] RDOS  Source if other than patient:  [ ]Family   [ ]Team     Pain: [ ]yes [x ]no  QOL impact -   Location -                    Aggravating factors -  Quality -  Radiation -  Timing-  Severity (0-10 scale):  Minimal acceptable level (0-10 scale):     CPOT:    https://www.[facility].org/getattachment/[email or web address]/Critical-Care-Pain-Observation-Tool-(CPOT)    PAINAD Score: See PAINAD tool and score below     Dyspnea:                           [ ]Mild [ ]Moderate [ ]Severe    RDOS: See RDOS tool and score below   0 to 2  minimal or no respiratory distress   3  mild distress  4 to 6 moderate distress  >7 severe distress      Anxiety:                             [ ]Mild [ ]Moderate [ ]Severe  Fatigue:                             [ ]Mild [ ]Moderate [ ]Severe  Nausea:                             [ ]Mild [ ]Moderate [ ]Severe  Loss of appetite:              [ ]Mild [ ]Moderate [ ]Severe  Constipation:                    [ ]Mild [ ]Moderate [ ]Severe    PCSSQ[Palliative Care Spiritual Screening Question]   Severity (0-10):  Score of 4 or > indicate consideration of Chaplaincy referral.  Chaplaincy Referral: [ ] yes [ ] refused [ ] following [x ] Deferred     Caregiver [place]? : [ ] yes [x ] no [ ] Deferred [ ] Declined             Social work referral [ ] Patient & Family Centered Care Referral [ ]     Anticipatory Grief present?:  [ ] yes [x ] no  [ ] Deferred                  Social work referral [ ] Chaplaincy Referral [ ]    		  Other Symptoms:  [x ]All other review of systems negative     Palliative Performance Status Version 2:   See PPSv2 tool and score below          PHYSICAL EXAM:  Vital Signs Last 24 Hrs  T(C): 36.3 (11 Apr 2024 11:30), Max: 36.6 (10 Apr 2024 16:09)  T(F): 97.4 (11 Apr 2024 11:30), Max: 97.8 (10 Apr 2024 16:09)  HR: 61 (11 Apr 2024 11:30) (53 - 63)  BP: 115/57 (11 Apr 2024 11:30) (92/52 - 115/57)  BP(mean): 72 (10 Apr 2024 19:35) (72 - 72)  RR: 17 (11 Apr 2024 11:30) (17 - 18)  SpO2: 100% (11 Apr 2024 11:30) (96% - 100%)    Parameters below as of 11 Apr 2024 11:30  Patient On (Oxygen Delivery Method): room air     I&O's Summary    GENERAL: [ ]Cachexia    [x ]Alert  [x ]Oriented    [ ]Lethargic  [ ]Unarousable  [x ]Verbal  [ ]Non-Verbal  Behavioral:   [ ] Anxiety  [ ] Delirium [ ] Agitation [ ] Other  HEENT:  [ ]Normal   [ ]Dry mouth   [ ]ET Tube/Trach  [ ]Oral lesions [x] Scleral icterus   PULMONARY:   [ x]Clear [ ]Tachypnea  [ ]Audible excessive secretions   [ ]Rhonchi        [ ]Right [ ]Left [ ]Bilateral  [ ]Crackles        [ ]Right [ ]Left [ ]Bilateral  [ ]Wheezing     [ ]Right [ ]Left [ ]Bilateral  [ ]Diminished breath sounds [ ]right [ ]left [ ]bilateral  CARDIOVASCULAR:    [x ]Regular [ ]Irregular [ ]Tachy  [ ]Wilmar [ ]Murmur [ ]Other  GASTROINTESTINAL:  [ ]Soft  [x ]Distended   [ x]+BS  [ x]Non tender [ ]Tender  [ ]Other [ ]PEG [ ]OGT/ NGT  Last BM: 4/11  GENITOURINARY:  [ x]Normal [ ] Incontinent   [ ]Oliguria/Anuria   [ ]Srinivasan  MUSCULOSKELETAL:   [ ]Normal   [ x]Weakness  [ ]Bed/Wheelchair bound [x ]Edema +2 pitting edema b/l lower ext  NEUROLOGIC:   [ ]No focal deficits  [ ]Cognitive impairment  [ ]Dysphagia [ ]Dysarthria [ ]Paresis [ ]Other   SKIN:   [ ]Normal  [ ]Rash  [x ]Other - extremities with jaundice   [ ]Pressure ulcer(s)       Present on admission [ ]y [ ]n    CRITICAL CARE:  [ ] Shock Present  [ ]Septic [ ]Cardiogenic [ ]Neurologic [ ]Hypovolemic  [ ]  Vasopressors [ ]  Inotropes   [ ]Respiratory failure present [ ]Mechanical ventilation [ ]Non-invasive ventilatory support [ ]High flow    [ ]Acute  [ ]Chronic [ ]Hypoxic  [ ]Hypercarbic [ ]Other  [ x]Other organ failure - Liver    LABS:                        10.4   5.61  )-----------( 108      ( 11 Apr 2024 06:58 )             29.9   04-11    129<L>  |  95<L>  |  52<H>  ----------------------------<  71  4.0   |  16<L>  |  2.20<H>    Ca    8.0<L>      11 Apr 2024 06:58  Phos  5.1     04-11  Mg     2.30     04-11    TPro  5.3<L>  /  Alb  3.2<L>  /  TBili  35.3<H>  /  DBili  x   /  AST  179<H>  /  ALT  77<H>  /  AlkPhos  353<H>  04-11  PT/INR - ( 10 Apr 2024 19:38 )   PT: 17.2 sec;   INR: 1.54 ratio             Urinalysis Basic - ( 11 Apr 2024 06:58 )    Color: x / Appearance: x / SG: x / pH: x  Gluc: 71 mg/dL / Ketone: x  / Bili: x / Urobili: x   Blood: x / Protein: x / Nitrite: x   Leuk Esterase: x / RBC: x / WBC x   Sq Epi: x / Non Sq Epi: x / Bacteria: x      RADIOLOGY & ADDITIONAL STUDIES:    PROTEIN CALORIE MALNUTRITION PRESENT: [ ]mild [ ]moderate [ ]severe [ ]underweight [ ]morbid obesity  https://www.andeal.org/vault/3311/web/files/ONC/Table_Clinical%20Characteristics%20to%20Document%20Malnutrition-White%20JV%20et%20al%202012.pdf    Height (cm): 157 (04-11-24 @ 12:37)  Weight (kg): 79.1 (04-11-24 @ 12:37)  BMI (kg/m2): 32.1 (04-11-24 @ 12:37)    [ ]PPSV2 < or = to 30% [ ]significant weight loss  [ ]poor nutritional intake  [ ]anasarca[ ]Artificial Nutrition      Other REFERRALS:  [ ]Hospice  [ ]Child Life  [ ]Social Work  [ ]Case management [ ]Holistic Therapy      Date of Service:04-11-24 @ 13:09    HPI:  74-year-old female with past medical history of recently diagnosed hepatic carcinoma, DM2, HTN, HLD, history of hepatitis C s/p treatment C/B cirrhosis presenting to the ER with weakness, chest tightness and shortness of breath. The patient initially was in [facility] and went to the hospital due to  increasing ascites and dyspnea. 10L fluids were removed. States long-standing hx of Hep C and complicated by cirrhosis. PAtient followed up with hepatology at [facility] and was sent directly to ED given clinical symptoms. The patient was admitted to [facility] for about  a month  and was diagnosed with HCC at that time. No Bx was done at the time. No treatment was offered by oncology team at [facility] and was managed palliatively and discharged with home hospice. Over past few days the patient developed reduced appetite, generalized fatigue. Last week she had paracentesis which removed 2.5L ascitic fluid. This AM she developed chest tightness and shortness of breath.  Reports abdominal discomfort at her baseline.  Denies fever/chills, palpitations, syncope, vomiting, diarrhea, black or bloody stool or any other concerns.     AT [facility] ED the patient vitals were significant for BP of 83/47, pulse in 50s, saturating 99% RA.  (10 Apr 2024 16:07)    PERTINENT PM/SXH:   Diabetes  Hypertension  Hepatitis-C  Liver cirrhosis  Hepatocellular carcinoma  No significant past surgical history    FAMILY HISTORY:     ITEMS NOT CHECKED ARE NOT PRESENT    SOCIAL HISTORY:   Significant other/partner[ ]  Children[ x]  Taoist/Spirituality: Tenriism  Substance hx:  [ ]   Tobacco hx:  [ ]   Alcohol hx: [ ]   Home Opioid hx:  [ x] I-Stop Reference No: [id number]  Living Situation: [ x]Home  [ ]Long term care  [ ]Rehab [ ]Other  Prescription Information      PDI Filter:    PDI	Current Rx	Drug Type	Rx Written	Rx Dispensed	Drug	Quantity	Days Supply	Prescriber Name	Prescriber ANNAMARIE #  A	N	O	10/24/2023	10/26/2023	hydrocodone-acetaminophen 5-325 mg tablet	6	3	Stacey Gongora	[id number]    ADVANCE DIRECTIVES:    DNR/MOLST  [ ]  Living Will  [ ]   DECISION MAKER(s):   [ x] Health Care Proxy(s)  [ ] Surrogate(s)  [ ] Guardian           Name(s): Phone Number(s):  Primary HCP: Son Neptali Stewart : [phone]  Alternate HCP: Daughter Andrews Hamad : [phone]    BASELINE (I)ADL(s) (prior to admission):  [place]: [ ]Total  [ x] Moderate [ ]Dependent    Allergies  penicillin (Other)    Intolerances    MEDICATIONS  (STANDING):  albumin human 25% IVPB 100 milliLiter(s) IV Intermittent every 8 hours  cefTRIAXone   IVPB 1000 milliGRAM(s) IV Intermittent every 24 hours  heparin   Injectable 5000 Unit(s) SubCutaneous every 8 hours  influenza  Vaccine (HIGH DOSE) 0.7 milliLiter(s) IntraMuscular once  lactulose Syrup 10 Gram(s) Oral three times a day  octreotide  Infusion 50 MICROgram(s)/Hr (10 mL/Hr) IV Continuous <Continuous>  pantoprazole    Tablet 40 milliGRAM(s) Oral before breakfast    MEDICATIONS  (PRN):  lidocaine   4% Patch 1 Patch Transdermal daily PRN back Pain    PRESENT SYMPTOMS: [ ]Unable to self-report  [ ] CPOT [ ] PAINADs [ ] RDOS  Source if other than patient:  [ ]Family   [ ]Team     Pain: [ ]yes [x ]no  QOL impact -   Location -                    Aggravating factors -  Quality -  Radiation -  Timing-  Severity (0-10 scale):  Minimal acceptable level (0-10 scale):     CPOT:    https://www.[facility].org/getattachment/[email or web address]/Critical-Care-Pain-Observation-Tool-(CPOT)    PAINAD Score: See PAINAD tool and score below     Dyspnea:                           [ ]Mild [ ]Moderate [ ]Severe    RDOS: See RDOS tool and score below   0 to 2  minimal or no respiratory distress   3  mild distress  4 to 6 moderate distress  >7 severe distress      Anxiety:                             [ ]Mild [ ]Moderate [ ]Severe  Fatigue:                             [ ]Mild [ ]Moderate [ ]Severe  Nausea:                             [ ]Mild [ ]Moderate [ ]Severe  Loss of appetite:              [ ]Mild [ ]Moderate [ ]Severe  Constipation:                    [ ]Mild [ ]Moderate [ ]Severe    PCSSQ[Palliative Care Spiritual Screening Question]   Severity (0-10):  Score of 4 or > indicate consideration of Chaplaincy referral.  Chaplaincy Referral: [ ] yes [ ] refused [ ] following [x ] Deferred     Caregiver [place]? : [ ] yes [x ] no [ ] Deferred [ ] Declined             Social work referral [ ] Patient & Family Centered Care Referral [ ]     Anticipatory Grief present?:  [ ] yes [x ] no  [ ] Deferred                  Social work referral [ ] Chaplaincy Referral [ ]    		  Other Symptoms:  [x ]All other review of systems negative     Palliative Performance Status Version 2:   See PPSv2 tool and score below          PHYSICAL EXAM:  Vital Signs Last 24 Hrs  T(C): 36.3 (11 Apr 2024 11:30), Max: 36.6 (10 Apr 2024 16:09)  T(F): 97.4 (11 Apr 2024 11:30), Max: 97.8 (10 Apr 2024 16:09)  HR: 61 (11 Apr 2024 11:30) (53 - 63)  BP: 115/57 (11 Apr 2024 11:30) (92/52 - 115/57)  BP(mean): 72 (10 Apr 2024 19:35) (72 - 72)  RR: 17 (11 Apr 2024 11:30) (17 - 18)  SpO2: 100% (11 Apr 2024 11:30) (96% - 100%)    Parameters below as of 11 Apr 2024 11:30  Patient On (Oxygen Delivery Method): room air     I&O's Summary    GENERAL: [ ]Cachexia    [x ]Alert  [x ]Oriented    [ ]Lethargic  [ ]Unarousable  [x ]Verbal  [ ]Non-Verbal  Behavioral:   [ ] Anxiety  [ ] Delirium [ ] Agitation [ x] Other- calm  HEENT:  [x ]Normal, except Scleral icterus    [ ]Dry mouth   [ ]ET Tube/Trach  [ ]Oral lesions  PULMONARY:   [ x]Clear [ ]Tachypnea  [ ]Audible excessive secretions   [ ]Rhonchi        [ ]Right [ ]Left [ ]Bilateral  [ ]Crackles        [ ]Right [ ]Left [ ]Bilateral  [ ]Wheezing     [ ]Right [ ]Left [ ]Bilateral  [ ]Diminished breath sounds [ ]right [ ]left [ ]bilateral  CARDIOVASCULAR:    [x ]Regular [ ]Irregular [ ]Tachy  [ ]Wilmar [ ]Murmur [ ]Other  GASTROINTESTINAL:  [ ]Soft  [x ]Distended   [ x]+BS  [ x]Non tender [ ]Tender  [ ]Other [ ]PEG [ ]OGT/ NGT  Last BM: 4/11  GENITOURINARY:  [ x]Normal [ ] Incontinent   [ ]Oliguria/Anuria   [ ]Srinivasan  MUSCULOSKELETAL:   [ ]Normal   [ x]Weakness  [ ]Bed/Wheelchair bound [x ]Edema +2 pitting edema b/l lower ext  NEUROLOGIC:   [ ]No focal deficits  [ ]Cognitive impairment  [ ]Dysphagia [ ]Dysarthria [ ]Paresis [ ]Other   SKIN:   [ ]Normal  [ ]Rash  [x ]Other - extremities with jaundice   [ ]Pressure ulcer(s)       Present on admission [ ]y [ ]n    CRITICAL CARE:  [ ] Shock Present  [ ]Septic [ ]Cardiogenic [ ]Neurologic [ ]Hypovolemic  [ ]  Vasopressors [ ]  Inotropes   [ ]Respiratory failure present [ ]Mechanical ventilation [ ]Non-invasive ventilatory support [ ]High flow    [ ]Acute  [ ]Chronic [ ]Hypoxic  [ ]Hypercarbic [ ]Other  [ x]Other organ failure - Liver    LABS:                        10.4   5.61  )-----------( 108      ( 11 Apr 2024 06:58 )             29.9   04-11    129<L>  |  95<L>  |  52<H>  ----------------------------<  71  4.0   |  16<L>  |  2.20<H>    Ca    8.0<L>      11 Apr 2024 06:58  Phos  5.1     04-11  Mg     2.30     04-11    TPro  5.3<L>  /  Alb  3.2<L>  /  TBili  35.3<H>  /  DBili  x   /  AST  179<H>  /  ALT  77<H>  /  AlkPhos  353<H>  04-11  PT/INR - ( 10 Apr 2024 19:38 )   PT: 17.2 sec;   INR: 1.54 ratio             Urinalysis Basic - ( 11 Apr 2024 06:58 )    Color: x / Appearance: x / SG: x / pH: x  Gluc: 71 mg/dL / Ketone: x  / Bili: x / Urobili: x   Blood: x / Protein: x / Nitrite: x   Leuk Esterase: x / RBC: x / WBC x   Sq Epi: x / Non Sq Epi: x / Bacteria: x      RADIOLOGY & ADDITIONAL STUDIES: reviewed     PROTEIN CALORIE MALNUTRITION PRESENT: [ ]mild [ ]moderate [ ]severe [ ]underweight [ ]morbid obesity  https://www.andeal.org/vault/2440/web/files/ONC/Table_Clinical%20Characteristics%20to%20Document%20Malnutrition-White%20JV%20et%20al%202012.pdf    Height (cm): 157 (04-11-24 @ 12:37)  Weight (kg): 79.1 (04-11-24 @ 12:37)  BMI (kg/m2): 32.1 (04-11-24 @ 12:37)    [ ]PPSV2 < or = to 30% [ ]significant weight loss  [ ]poor nutritional intake  [ ]anasarca[ ]Artificial Nutrition      Other REFERRALS:  [ ]Hospice  [ ]Child Life  [ ]Social Work  [x ]Case management [ ]Holistic Therapy      Date of Service:04-11-24 @ 13:09    HPI:  74-year-old female with past medical history of recently diagnosed hepatic carcinoma, DM2, HTN, HLD, history of hepatitis C s/p treatment C/B cirrhosis presenting to the ER with weakness, chest tightness and shortness of breath. The patient initially was in [facility] and went to the hospital due to  increasing ascites and dyspnea. 10L fluids were removed. States long-standing hx of Hep C and complicated by cirrhosis. PAtient followed up with hepatology at [facility] and was sent directly to ED given clinical symptoms. The patient was admitted to [facility] for about  a month  and was diagnosed with HCC at that time. No Bx was done at the time. No treatment was offered by oncology team at [facility] and was managed palliatively and discharged with home hospice. Over past few days the patient developed reduced appetite, generalized fatigue. Last week she had paracentesis which removed 2.5L ascitic fluid. This AM she developed chest tightness and shortness of breath.  Reports abdominal discomfort at her baseline.  Denies fever/chills, palpitations, syncope, vomiting, diarrhea, black or bloody stool or any other concerns.     AT [facility] ED the patient vitals were significant for BP of 83/47, pulse in 50s, saturating 99% RA.  (10 Apr 2024 16:07)    PERTINENT PM/SXH:   Diabetes  Hypertension  Hepatitis-C  Liver cirrhosis  Hepatocellular carcinoma  No significant past surgical history    FAMILY HISTORY:     ITEMS NOT CHECKED ARE NOT PRESENT    SOCIAL HISTORY:   Significant other/partner[ ]  Children[ x]  Pentecostal/Spirituality: Gnosticist  Substance hx:  [ ]   Tobacco hx:  [ ]   Alcohol hx: [ ]   Home Opioid hx:  [ x] I-Stop Reference No: [id number]  Living Situation: [ x]Home  [ ]Long term care  [ ]Rehab [ ]Other  Prescription Information      PDI Filter:    PDI	Current Rx	Drug Type	Rx Written	Rx Dispensed	Drug	Quantity	Days Supply	Prescriber Name	Prescriber ANNAMARIE #  A	N	O	10/24/2023	10/26/2023	hydrocodone-acetaminophen 5-325 mg tablet	6	3	Stacey Gongora	[id number]    ADVANCE DIRECTIVES:    DNR/MOLST  [ ]  Living Will  [ ]   DECISION MAKER(s):   [ x] Health Care Proxy(s)  [ ] Surrogate(s)  [ ] Guardian           Name(s): Phone Number(s):  Primary HCP: Son Neptali Stewart : [phone]  Alternate HCP: Daughter Andrews Hamad : [phone]    BASELINE (I)ADL(s) (prior to admission):  [place]: [ ]Total  [ x] Moderate [ ]Dependent    Allergies  penicillin (Other)    Intolerances    MEDICATIONS  (STANDING):  albumin human 25% IVPB 100 milliLiter(s) IV Intermittent every 8 hours  cefTRIAXone   IVPB 1000 milliGRAM(s) IV Intermittent every 24 hours  heparin   Injectable 5000 Unit(s) SubCutaneous every 8 hours  influenza  Vaccine (HIGH DOSE) 0.7 milliLiter(s) IntraMuscular once  lactulose Syrup 10 Gram(s) Oral three times a day  octreotide  Infusion 50 MICROgram(s)/Hr (10 mL/Hr) IV Continuous <Continuous>  pantoprazole    Tablet 40 milliGRAM(s) Oral before breakfast    MEDICATIONS  (PRN):  lidocaine   4% Patch 1 Patch Transdermal daily PRN back Pain    PRESENT SYMPTOMS: [ ]Unable to self-report  [ ] CPOT [ ] PAINADs [ ] RDOS  Source if other than patient:  [ ]Family   [ ]Team     Pain: [ ]yes [x ]no  QOL impact -   Location -                    Aggravating factors -  Quality -  Radiation -  Timing-  Severity (0-10 scale):  Minimal acceptable level (0-10 scale):     CPOT:    https://www.[facility].org/getattachment/[email or web address]/Critical-Care-Pain-Observation-Tool-(CPOT)    PAINAD Score: See PAINAD tool and score below     Dyspnea:                           [ ]Mild [ ]Moderate [ ]Severe    RDOS: See RDOS tool and score below   0 to 2  minimal or no respiratory distress   3  mild distress  4 to 6 moderate distress  >7 severe distress      Anxiety:                             [ ]Mild [ ]Moderate [ ]Severe  Fatigue:                             [ ]Mild [ ]Moderate [ ]Severe  Nausea:                             [ ]Mild [ ]Moderate [ ]Severe  Loss of appetite:              [ ]Mild [ ]Moderate [ ]Severe  Constipation:                    [ ]Mild [ ]Moderate [ ]Severe    PCSSQ[Palliative Care Spiritual Screening Question]   Severity (0-10):  Score of 4 or > indicate consideration of Chaplaincy referral.  Chaplaincy Referral: [ ] yes [ ] refused [ ] following [x ] Deferred     Caregiver [place]? : [ ] yes [x ] no [ ] Deferred [ ] Declined             Social work referral [ ] Patient & Family Centered Care Referral [ ]     Anticipatory Grief present?:  [ ] yes [x ] no  [ ] Deferred                  Social work referral [ ] Chaplaincy Referral [ ]    		  Other Symptoms:  [x ]All other review of systems negative     Palliative Performance Status Version 2:   See PPSv2 tool and score below          PHYSICAL EXAM:  Vital Signs Last 24 Hrs  T(C): 36.3 (11 Apr 2024 11:30), Max: 36.6 (10 Apr 2024 16:09)  T(F): 97.4 (11 Apr 2024 11:30), Max: 97.8 (10 Apr 2024 16:09)  HR: 61 (11 Apr 2024 11:30) (53 - 63)  BP: 115/57 (11 Apr 2024 11:30) (92/52 - 115/57)  BP(mean): 72 (10 Apr 2024 19:35) (72 - 72)  RR: 17 (11 Apr 2024 11:30) (17 - 18)  SpO2: 100% (11 Apr 2024 11:30) (96% - 100%)    Parameters below as of 11 Apr 2024 11:30  Patient On (Oxygen Delivery Method): room air     I&O's Summary    GENERAL: [ ]Cachexia    [x ]Alert  [x ]Oriented    [ ]Lethargic  [ ]Unarousable  [x ]Verbal  [ ]Non-Verbal  Behavioral:   [ ] Anxiety  [ ] Delirium [ ] Agitation [ x] Other- calm  HEENT:  [x ]Normal, except Scleral icterus    [ ]Dry mouth   [ ]ET Tube/Trach  [ ]Oral lesions  PULMONARY:   [ x]Clear [ ]Tachypnea  [ ]Audible excessive secretions   [ ]Rhonchi        [ ]Right [ ]Left [ ]Bilateral  [ ]Crackles        [ ]Right [ ]Left [ ]Bilateral  [ ]Wheezing     [ ]Right [ ]Left [ ]Bilateral  [ ]Diminished breath sounds [ ]right [ ]left [ ]bilateral  CARDIOVASCULAR:    [x ]Regular [ ]Irregular [ ]Tachy  [ ]Wilmar [ ]Murmur [ ]Other  GASTROINTESTINAL:  [ ]Soft  [x ]Distended   [ x]+BS  [ x]Non tender [ ]Tender  [ ]Other [ ]PEG [ ]OGT/ NGT  Last BM: 4/11  GENITOURINARY:  [ x]Normal [ ] Incontinent   [ ]Oliguria/Anuria   [ ]Srinivasan  MUSCULOSKELETAL:   [ ]Normal   [ x]Weakness  [ ]Bed/Wheelchair bound [x ]Edema +2 pitting edema b/l lower ext  NEUROLOGIC:   [ x]No focal deficits  [ ]Cognitive impairment  [ ]Dysphagia [ ]Dysarthria [ ]Paresis [ ]Other   SKIN:   [ ]Normal  [ ]Rash  [x ]Other - extremities with jaundice   [ ]Pressure ulcer(s)       Present on admission [ ]y [ ]n    CRITICAL CARE:  [ ] Shock Present  [ ]Septic [ ]Cardiogenic [ ]Neurologic [ ]Hypovolemic  [ ]  Vasopressors [ ]  Inotropes   [ ]Respiratory failure present [ ]Mechanical ventilation [ ]Non-invasive ventilatory support [ ]High flow    [ ]Acute  [ ]Chronic [ ]Hypoxic  [ ]Hypercarbic [ ]Other  [ x]Other organ failure - Liver    LABS:                        10.4   5.61  )-----------( 108      ( 11 Apr 2024 06:58 )             29.9   04-11    129<L>  |  95<L>  |  52<H>  ----------------------------<  71  4.0   |  16<L>  |  2.20<H>    Ca    8.0<L>      11 Apr 2024 06:58  Phos  5.1     04-11  Mg     2.30     04-11    TPro  5.3<L>  /  Alb  3.2<L>  /  TBili  35.3<H>  /  DBili  x   /  AST  179<H>  /  ALT  77<H>  /  AlkPhos  353<H>  04-11  PT/INR - ( 10 Apr 2024 19:38 )   PT: 17.2 sec;   INR: 1.54 ratio             Urinalysis Basic - ( 11 Apr 2024 06:58 )    Color: x / Appearance: x / SG: x / pH: x  Gluc: 71 mg/dL / Ketone: x  / Bili: x / Urobili: x   Blood: x / Protein: x / Nitrite: x   Leuk Esterase: x / RBC: x / WBC x   Sq Epi: x / Non Sq Epi: x / Bacteria: x      RADIOLOGY & ADDITIONAL STUDIES: reviewed     PROTEIN CALORIE MALNUTRITION PRESENT: [ ]mild [ ]moderate [ ]severe [ ]underweight [ ]morbid obesity  https://[email or web address].org/vault/2440/[email or web address]_Clinical%20Characteristics%20to%[email or web address]-White%[email or web address]%[email or web address]%[email or web address]    Height (cm): 157 (04-11-24 @ 12:37)  Weight (kg): 79.1 (04-11-24 @ 12:37)  BMI (kg/m2): 32.1 (04-11-24 @ 12:37)    [ ]PPSV2 < or = to 30% [ ]significant weight loss  [ ]poor nutritional intake  [ ]anasarca[ ]Artificial Nutrition      Other REFERRALS:  [ ]Hospice  [ ]Child Life  [ ]Social Work  [x ]Case management [ ]Holistic Therapy

## 2024-04-11 NOTE — CONSULT NOTE ADULT - PROBLEM SELECTOR RECOMMENDATION 9
- Dx in 3/2024  - Son reports after discharge from [facility], they did not initiate hospice services  - Recent paracentesis last week and plan for paracentesis while inpt  - Family would like second opinion on treatment options by Oncology team, awaiting recommendations

## 2024-04-11 NOTE — PROGRESS NOTE ADULT - PROBLEM SELECTOR PLAN 3
- Hypotensive to MAP 50s on admission s/p 3L Bolus and Albumin   - HR persistently low in 50s, not feasible to start midodrine     Plan:   - Albumin for hypotension, limit fluid boluses iso decompensated cirrhosis, Avoid midodrine 2/2 bradycardia  - Hold Lasix and Spironolactone  - Low threshold for micu consult - Hypotensive to MAP 50s on admission s/p 3L Bolus and Albumin   - HR persistently low in 50s, not feasible to start midodrine     Plan:   - Albumin 2x for hypotension, limit fluid boluses iso decompensated cirrhosis, Avoid midodrine 2/2 bradycardia  - Hold Lasix and Spironolactone  - Low threshold for micu consult

## 2024-04-11 NOTE — PROGRESS NOTE ADULT - PROBLEM SELECTOR PLAN 2
- Hyponatremic to 125 w/ Urine sodium <20; likely iso very poor PO intake   - Red < 20, Uosm >330; Patient appears floridly overloaded; likely iso Cirrhosis  - Ideally would fluid restrict patient, however given patient's poor appetite, would start D10 NS    Plan:  D10NS at 50cc/hr   BMP Q6, avoid overcorrection Na >10 in 24h - Hyponatremic to 125 w/ Urine sodium <20; likely iso very poor PO intake   - Red < 20, Uosm >330; Patient appears floridly overloaded; likely iso Cirrhosis  - Ideally would fluid restrict patient, however given patient's poor appetite, would start D10 NS    Plan:  D10NS DC'd for now to restrict fluids   Low sodium diet  BMP Q6, avoid overcorrection Na >10 in 24h

## 2024-04-11 NOTE — CONSULT NOTE ADULT - PROBLEM SELECTOR RECOMMENDATION 3
Denies any current pain but inquired if she would have something available if pain was present in her abdomen  - Recommend Oxycodone 2.5mg q4h PRN severe pain  - Bowel regimen while on opioids Denies any current pain but inquired if she would have something available if pain was present in her abdomen  - In the event of pain, Recommend Oxycodone 2.5mg q4h PRN severe pain  - Bowel regimen while on opioids

## 2024-04-12 ENCOUNTER — TRANSCRIPTION ENCOUNTER (OUTPATIENT)
Age: 74
End: 2024-04-12

## 2024-04-12 LAB
AFP-TM SERPL-MCNC: 258 NG/ML — HIGH
ALBUMIN FLD-MCNC: 1.2 G/DL — SIGNIFICANT CHANGE UP
ALBUMIN SERPL ELPH-MCNC: 3.4 G/DL — SIGNIFICANT CHANGE UP (ref 3.3–5)
ALP SERPL-CCNC: 308 U/L — HIGH (ref 40–120)
ALT FLD-CCNC: 67 U/L — HIGH (ref 4–33)
AMMONIA BLD-MCNC: 177 UMOL/L — HIGH (ref 11–55)
ANION GAP SERPL CALC-SCNC: 20 MMOL/L — HIGH (ref 7–14)
APTT BLD: 68.4 SEC — HIGH (ref 24.5–35.6)
AST SERPL-CCNC: 125 U/L — HIGH (ref 4–32)
B PERT IGG+IGM PNL SER: ABNORMAL
B PERT IGG+IGM PNL SER: CLEAR — SIGNIFICANT CHANGE UP
BASOPHILS # BLD AUTO: 0.01 K/UL — SIGNIFICANT CHANGE UP (ref 0–0.2)
BASOPHILS NFR BLD AUTO: 0.2 % — SIGNIFICANT CHANGE UP (ref 0–2)
BILIRUB SERPL-MCNC: 36.5 MG/DL — HIGH (ref 0.2–1.2)
BUN SERPL-MCNC: 49 MG/DL — HIGH (ref 7–23)
CALCIUM SERPL-MCNC: 8.3 MG/DL — LOW (ref 8.4–10.5)
CHLORIDE SERPL-SCNC: 94 MMOL/L — LOW (ref 98–107)
CO2 SERPL-SCNC: 16 MMOL/L — LOW (ref 22–31)
COLOR FLD: ABNORMAL
COLOR FLD: YELLOW
CORTIS AM PEAK SERPL-MCNC: 22.9 UG/DL — HIGH (ref 6–18.4)
CREAT SERPL-MCNC: 1.94 MG/DL — HIGH (ref 0.5–1.3)
EGFR: 26 ML/MIN/1.73M2 — LOW
EOSINOPHIL # BLD AUTO: 0.06 K/UL — SIGNIFICANT CHANGE UP (ref 0–0.5)
EOSINOPHIL # FLD: 0 % — SIGNIFICANT CHANGE UP
EOSINOPHIL # FLD: 2 % — SIGNIFICANT CHANGE UP
EOSINOPHIL NFR BLD AUTO: 1.2 % — SIGNIFICANT CHANGE UP (ref 0–6)
FLUID INTAKE SUBSTANCE CLASS: SIGNIFICANT CHANGE UP
FLUID INTAKE SUBSTANCE CLASS: SIGNIFICANT CHANGE UP
FOLATE+VIT B12 SERBLD-IMP: 0 % — SIGNIFICANT CHANGE UP
GLUCOSE BLDC GLUCOMTR-MCNC: 140 MG/DL — HIGH (ref 70–99)
GLUCOSE BLDC GLUCOMTR-MCNC: 162 MG/DL — HIGH (ref 70–99)
GLUCOSE BLDC GLUCOMTR-MCNC: 169 MG/DL — HIGH (ref 70–99)
GLUCOSE BLDC GLUCOMTR-MCNC: 211 MG/DL — HIGH (ref 70–99)
GLUCOSE BLDC GLUCOMTR-MCNC: 262 MG/DL — HIGH (ref 70–99)
GLUCOSE FLD-MCNC: 211 MG/DL — SIGNIFICANT CHANGE UP
GLUCOSE SERPL-MCNC: 136 MG/DL — HIGH (ref 70–99)
GRAM STN FLD: SIGNIFICANT CHANGE UP
HCT VFR BLD CALC: 29.3 % — LOW (ref 34.5–45)
HGB BLD-MCNC: 10.2 G/DL — LOW (ref 11.5–15.5)
IANC: 3.64 K/UL — SIGNIFICANT CHANGE UP (ref 1.8–7.4)
IMM GRANULOCYTES NFR BLD AUTO: 2 % — HIGH (ref 0–0.9)
INR BLD: 1.92 RATIO — HIGH (ref 0.85–1.18)
LDH SERPL L TO P-CCNC: 128 U/L — SIGNIFICANT CHANGE UP
LYMPHOCYTES # BLD AUTO: 0.77 K/UL — LOW (ref 1–3.3)
LYMPHOCYTES # BLD AUTO: 15.2 % — SIGNIFICANT CHANGE UP (ref 13–44)
LYMPHOCYTES # FLD: 32 % — SIGNIFICANT CHANGE UP
LYMPHOCYTES # FLD: 9 % — SIGNIFICANT CHANGE UP
MAGNESIUM SERPL-MCNC: 2.3 MG/DL — SIGNIFICANT CHANGE UP (ref 1.6–2.6)
MCHC RBC-ENTMCNC: 33.8 PG — SIGNIFICANT CHANGE UP (ref 27–34)
MCHC RBC-ENTMCNC: 34.8 GM/DL — SIGNIFICANT CHANGE UP (ref 32–36)
MCV RBC AUTO: 97 FL — SIGNIFICANT CHANGE UP (ref 80–100)
MESOTHL CELL # FLD: 0 % — SIGNIFICANT CHANGE UP
MONOCYTES # BLD AUTO: 0.5 K/UL — SIGNIFICANT CHANGE UP (ref 0–0.9)
MONOCYTES NFR BLD AUTO: 9.8 % — SIGNIFICANT CHANGE UP (ref 2–14)
MONOS+MACROS # FLD: 5 % — SIGNIFICANT CHANGE UP
MONOS+MACROS # FLD: 55 % — SIGNIFICANT CHANGE UP
NEUTROPHILS # BLD AUTO: 3.64 K/UL — SIGNIFICANT CHANGE UP (ref 1.8–7.4)
NEUTROPHILS NFR BLD AUTO: 71.6 % — SIGNIFICANT CHANGE UP (ref 43–77)
NEUTROPHILS-BODY FLUID: 13 % — SIGNIFICANT CHANGE UP
NEUTROPHILS-BODY FLUID: 74 % — SIGNIFICANT CHANGE UP
NRBC # BLD: 1 /100 WBCS — HIGH (ref 0–0)
NRBC # FLD: 0.05 K/UL — HIGH (ref 0–0)
NRBC # FLD: 1 % — SIGNIFICANT CHANGE UP
OTHER CELLS FLD MANUAL: 0 % — SIGNIFICANT CHANGE UP
OTHER CELLS FLD MANUAL: 10 % — SIGNIFICANT CHANGE UP
PHOSPHATE SERPL-MCNC: 4 MG/DL — SIGNIFICANT CHANGE UP (ref 2.5–4.5)
PLATELET # BLD AUTO: 104 K/UL — LOW (ref 150–400)
POTASSIUM SERPL-MCNC: 3.8 MMOL/L — SIGNIFICANT CHANGE UP (ref 3.5–5.3)
POTASSIUM SERPL-SCNC: 3.8 MMOL/L — SIGNIFICANT CHANGE UP (ref 3.5–5.3)
PROT FLD-MCNC: 1.6 G/DL — SIGNIFICANT CHANGE UP
PROT SERPL-MCNC: 4.9 G/DL — LOW (ref 6–8.3)
PROTHROM AB SERPL-ACNC: 21.3 SEC — HIGH (ref 9.5–13)
RBC # BLD: 3.02 M/UL — LOW (ref 3.8–5.2)
RBC # FLD: 19.6 % — HIGH (ref 10.3–14.5)
RCV VOL RI: <2000 CELLS/UL — HIGH (ref 0–5)
RCV VOL RI: HIGH CELLS/UL (ref 0–5)
SODIUM SERPL-SCNC: 130 MMOL/L — LOW (ref 135–145)
SPECIMEN SOURCE FLD: SIGNIFICANT CHANGE UP
SPECIMEN SOURCE: SIGNIFICANT CHANGE UP
TOTAL CELLS COUNTED, BODY FLUID: 100 CELLS — SIGNIFICANT CHANGE UP
TOTAL CELLS COUNTED, BODY FLUID: 100 CELLS — SIGNIFICANT CHANGE UP
TOTAL NUCLEATED CELL COUNT, BODY FLUID: 103 CELLS/UL — HIGH (ref 0–5)
TOTAL NUCLEATED CELL COUNT, BODY FLUID: 4876 CELLS/UL — HIGH (ref 0–5)
TUBE TYPE: SIGNIFICANT CHANGE UP
TUBE TYPE: SIGNIFICANT CHANGE UP
WBC # BLD: 5.08 K/UL — SIGNIFICANT CHANGE UP (ref 3.8–10.5)
WBC # FLD AUTO: 5.08 K/UL — SIGNIFICANT CHANGE UP (ref 3.8–10.5)

## 2024-04-12 PROCEDURE — 99497 ADVNCD CARE PLAN 30 MIN: CPT | Mod: GC,25

## 2024-04-12 PROCEDURE — 99233 SBSQ HOSP IP/OBS HIGH 50: CPT | Mod: GC

## 2024-04-12 PROCEDURE — 88305 TISSUE EXAM BY PATHOLOGIST: CPT | Mod: 26

## 2024-04-12 PROCEDURE — 49083 ABD PARACENTESIS W/IMAGING: CPT

## 2024-04-12 PROCEDURE — 99232 SBSQ HOSP IP/OBS MODERATE 35: CPT | Mod: GC

## 2024-04-12 PROCEDURE — 88112 CYTOPATH CELL ENHANCE TECH: CPT | Mod: 26

## 2024-04-12 RX ORDER — CHLORHEXIDINE GLUCONATE 213 G/1000ML
1 SOLUTION TOPICAL DAILY
Refills: 0 | Status: DISCONTINUED | OUTPATIENT
Start: 2024-04-12 | End: 2024-04-15

## 2024-04-12 RX ORDER — PHYTONADIONE (VIT K1) 5 MG
5 TABLET ORAL DAILY
Refills: 0 | Status: COMPLETED | OUTPATIENT
Start: 2024-04-12 | End: 2024-04-14

## 2024-04-12 RX ORDER — ALBUMIN HUMAN 25 %
100 VIAL (ML) INTRAVENOUS EVERY 8 HOURS
Refills: 0 | Status: COMPLETED | OUTPATIENT
Start: 2024-04-12 | End: 2024-04-14

## 2024-04-12 RX ORDER — SIMETHICONE 80 MG/1
80 TABLET, CHEWABLE ORAL THREE TIMES A DAY
Refills: 0 | Status: DISCONTINUED | OUTPATIENT
Start: 2024-04-12 | End: 2024-04-15

## 2024-04-12 RX ORDER — BENZOCAINE AND MENTHOL 5; 1 G/100ML; G/100ML
1 LIQUID ORAL ONCE
Refills: 0 | Status: COMPLETED | OUTPATIENT
Start: 2024-04-12 | End: 2024-04-12

## 2024-04-12 RX ORDER — HEPARIN SODIUM 5000 [USP'U]/ML
5000 INJECTION INTRAVENOUS; SUBCUTANEOUS EVERY 8 HOURS
Refills: 0 | Status: DISCONTINUED | OUTPATIENT
Start: 2024-04-12 | End: 2024-04-15

## 2024-04-12 RX ADMIN — Medication 100 MILLILITER(S): at 05:34

## 2024-04-12 RX ADMIN — PANTOPRAZOLE SODIUM 40 MILLIGRAM(S): 20 TABLET, DELAYED RELEASE ORAL at 05:38

## 2024-04-12 RX ADMIN — HEPARIN SODIUM 5000 UNIT(S): 5000 INJECTION INTRAVENOUS; SUBCUTANEOUS at 22:22

## 2024-04-12 RX ADMIN — LACTULOSE 10 GRAM(S): 10 SOLUTION ORAL at 05:34

## 2024-04-12 RX ADMIN — LIDOCAINE 1 PATCH: 4 CREAM TOPICAL at 19:15

## 2024-04-12 RX ADMIN — Medication 1 APPLICATION(S): at 05:33

## 2024-04-12 RX ADMIN — CEFTRIAXONE 100 MILLIGRAM(S): 500 INJECTION, POWDER, FOR SOLUTION INTRAMUSCULAR; INTRAVENOUS at 13:31

## 2024-04-12 RX ADMIN — BENZOCAINE AND MENTHOL 1 LOZENGE: 5; 1 LIQUID ORAL at 02:05

## 2024-04-12 RX ADMIN — LACTULOSE 10 GRAM(S): 10 SOLUTION ORAL at 13:31

## 2024-04-12 RX ADMIN — Medication 1 APPLICATION(S): at 22:29

## 2024-04-12 RX ADMIN — LACTULOSE 10 GRAM(S): 10 SOLUTION ORAL at 22:21

## 2024-04-12 RX ADMIN — Medication 100 MILLILITER(S): at 22:26

## 2024-04-12 RX ADMIN — Medication 5 MILLIGRAM(S): at 13:55

## 2024-04-12 RX ADMIN — OCTREOTIDE ACETATE 10 MICROGRAM(S)/HR: 200 INJECTION, SOLUTION INTRAVENOUS; SUBCUTANEOUS at 07:48

## 2024-04-12 RX ADMIN — Medication 100 MILLILITER(S): at 14:54

## 2024-04-12 RX ADMIN — OCTREOTIDE ACETATE 10 MICROGRAM(S)/HR: 200 INJECTION, SOLUTION INTRAVENOUS; SUBCUTANEOUS at 18:26

## 2024-04-12 RX ADMIN — Medication 1 APPLICATION(S): at 17:34

## 2024-04-12 RX ADMIN — LIDOCAINE 1 PATCH: 4 CREAM TOPICAL at 07:55

## 2024-04-12 RX ADMIN — CHLORHEXIDINE GLUCONATE 1 APPLICATION(S): 213 SOLUTION TOPICAL at 22:23

## 2024-04-12 RX ADMIN — Medication 650 MILLIGRAM(S): at 13:54

## 2024-04-12 NOTE — DIETITIAN INITIAL EVALUATION ADULT - REASON FOR ADMISSION
Per chart: The patient is a 74y Female with PMH of recently diagnosed hepatic carcinoma, DM2, HTN, HLD, history of hepatitis C s/p treatment C/B cirrhosis(ascites) presenting to the ER with weakness, chest tightness and shortness of breath. Over past few days the patient developed reduced appetite, generalized fatigue. Last week she had paracentesis which removed 2.5L ascitic fluid.

## 2024-04-12 NOTE — DISCHARGE NOTE PROVIDER - NSDCHHHOMEBOUND_GEN_ALL_CORE
Requires supervison due to deteriorating mental status.../Fall risk/Other, specify.../Pain greater than 7 on scale of 10 on ambulation

## 2024-04-12 NOTE — DIETITIAN INITIAL EVALUATION ADULT - PERSON TAUGHT/METHOD
Discussed importance of having protein dense foods and adequate PO intake to maintain lean body mass./verbal instruction/son instructed

## 2024-04-12 NOTE — PROGRESS NOTE ADULT - PROBLEM SELECTOR PLAN 3
Denies any current pain but agreeable to have pain medication available if needed  - In the event of pain, Recommend Oxycodone 2.5mg q4h PRN severe pain  - Bowel regimen while on opioids.

## 2024-04-12 NOTE — DIETITIAN INITIAL EVALUATION ADULT - ORAL INTAKE PTA/DIET HISTORY
Met patient and her son at bedside. Patient is not with hearing aid, hard to hear. Therefore, have a interview with her son. As per son, patient has poor appetite / PO intake PTA. States she didn't eat a lot because of pain this week PTA. Family cooks for her and follow low sodium diet PTA. Medication notable with lasix, insulin and bowel regimen PTA. Denies prior use of nutrition shakes/ vitamins PTA. No other reported issues.

## 2024-04-12 NOTE — DIETITIAN INITIAL EVALUATION ADULT - PERTINENT LABORATORY DATA
04-12    130<L>  |  94<L>  |  49<H>  ----------------------------<  136<H>  3.8   |  16<L>  |  1.94<H>    Ca    8.3<L>      12 Apr 2024 06:00  Phos  4.0     04-12  Mg     2.30     04-12    TPro  4.9<L>  /  Alb  3.4  /  TBili  36.5<H>  /  DBili  x   /  AST  125<H>  /  ALT  67<H>  /  AlkPhos  308<H>  04-12  POCT Blood Glucose.: 169 mg/dL (04-12-24 @ 12:37)  A1C with Estimated Average Glucose Result: <4.0 % (04-11-24 @ 06:58)

## 2024-04-12 NOTE — DIETITIAN INITIAL EVALUATION ADULT - PROBLEM SELECTOR PLAN 2
- Hyponatremic to 125 w/ Urine sodium <20; likely iso very poor PO intake   - eRd < 20, Uosm >330; Patient appears floridly overloaded; likely iso Cirrhosis  - Ideally would fluid restrict patient, however given patient's poor appetite, would start D10 NS    Plan:  D10NS at 50cc/hr   BMP Q6, avoid overcorrection Na >10 in 24h

## 2024-04-12 NOTE — DISCHARGE NOTE PROVIDER - NSDCMRMEDTOKEN_GEN_ALL_CORE_FT
carvedilol 3.125 mg oral tablet: 1 tab(s) orally 2 times a day  gabapentin 100 mg oral tablet: orally 3 times a day as needed for  pain  glimepiride 2 mg oral tablet: 1 tab(s) orally once a day  lactulose 10 g/15 mL oral and rectal liquid: 300 milliliter(s) rectally 2 times a day  Lasix 20 mg oral tablet: 1 tab(s) orally once a day  spironolactone 25 mg oral tablet: 1 tab(s) orally once a day   carvedilol 3.125 mg oral tablet: 1 tab(s) orally 2 times a day  Constulose 10 g/15 mL oral liquid: 22.5 milliliter(s) orally 3 times a day Please aim for 3-4 bowel movements a day  gabapentin 100 mg oral tablet: orally 3 times a day as needed for  pain  haloperidol 0.5 mg oral tablet: 1 tab(s) orally once a day (at bedtime)  Lasix 20 mg oral tablet: 1 tab(s) orally once a day  lidocaine 4% topical film: Apply topically to affected area once a day MDD: 1 patch  melatonin 3 mg oral tablet: 2 tab(s) orally once a day (at bedtime) as needed for  insomnia  morphine 10 mg/0.5 mL oral solution: 0.5 milliliter(s) orally every 4 hours as needed for  severe pain MDD: 60mg  spironolactone 25 mg oral tablet: 1 tab(s) orally once a day

## 2024-04-12 NOTE — DIETITIAN INITIAL EVALUATION ADULT - NS FNS DIET ORDER
Diet, Regular:   Low Sodium  [employer] Vegetarian (Accepts Fish)  Supplement Feeding Modality:  Oral       Frequency:  Two Times a day  Glucerna Shake Cans or Servings Per Day:  2       Frequency:  Daily (04-12-24 @ 08:53)

## 2024-04-12 NOTE — PROGRESS NOTE ADULT - PROBLEM SELECTOR PLAN 2
- Hyponatremic to 125 w/ Urine sodium <20; likely iso very poor PO intake   - Red < 20, Uosm >330; Patient appears floridly overloaded; likely iso Cirrhosis  - Ideally would fluid restrict patient, however given patient's poor appetite, would start D10 NS    Plan:  D10NS DC'd for now to restrict fluids   Low sodium diet  BMP Q6, avoid overcorrection Na >10 in 24h Improving   - Hyponatremic to 125 w/ Urine sodium <20; likely iso very poor PO intake   - Red < 20, Uosm >330; Patient appears floridly overloaded; likely iso Cirrhosis      Plan:  restrict fluids  Low sodium diet due to cirrhosis  cmp Q12

## 2024-04-12 NOTE — PROGRESS NOTE ADULT - PROBLEM SELECTOR PLAN 5
Symptoms are controlled and goals are addressed. Re-consult as needed. Please page [phone] for any questions or concerns.

## 2024-04-12 NOTE — PROGRESS NOTE ADULT - PROBLEM SELECTOR PLAN 1
- Dx in 3/2024  - Son reports after discharge from [facility], they did not initiate hospice services  - Plan for paracentesis today  - Oncology team at [facility] agrees with [facility] team- no treatment being offered

## 2024-04-12 NOTE — MEDICAL STUDENT PROGRESS NOTE(EDUCATION) - ASSESSMENT
74F with diagnosed HCC recommended hospice and PMH chronic hep C, CAD, T2DM, HTN, HLD, seeking second opinion and pending paracentesis

## 2024-04-12 NOTE — PROGRESS NOTE ADULT - ASSESSMENT
74F w/ HCC 2/2 chronic Hep C s/p harvoni 2015, decomensated cirrhosis (ascites), CAD (stress test), T2DM, HTN, HLD, with recent admission to [facility] for worsening jaundice, found to have infiltrative HCC w/ extensive tumor thrombus initially discharged to home hospice presents to [facility] with new onset chest tightness as well as 1-2 weeks of worsening lethargy/failure to thrive.

## 2024-04-12 NOTE — PROGRESS NOTE ADULT - PROBLEM SELECTOR PLAN 7
DVT PPx: Heparin SubQ   Gi PPx: Pantoprazole  Diet: pescatarian diet   PT/OT: Hold off until hemodynamically stable  Code Status: Full code   Dispo: Pending DVT PPx: Heparin SubQ   Gi PPx: Pantoprazole  Diet: pescatarian diet 2x/d glucerna  PT/OT: Hold off until hemodynamically stable  Code Status: Full code   Dispo: Pending

## 2024-04-12 NOTE — PROGRESS NOTE ADULT - PROBLEM SELECTOR PLAN 4
hypoglycemic to 50s on admission likely iso liver failure   Continues to have hypoglycemic episodes to 50s  Endocrine consulted  -Low TSH normal T4    - D50 PRN for severe hypoglycemia   - Diet started + Ensure 2x/day  - CTM finger sticks w/ meals  - AM cortisol eval hypoglycemia and bradycardia  - sulfonylurea levels  - Nutrition consult hypoglycemic to 50s on admission likely iso sulfonylurea use   Continues to have hypoglycemic episodes to 50s;   Endocrine consulted  -Low TSH normal T4, normal cortisol     - D50 PRN for severe hypoglycemia   - Diet started + glucerna 2x/day  - CTM finger sticks w/ meals  - sulfonylurea levels  - Nutrition consult

## 2024-04-12 NOTE — PROGRESS NOTE ADULT - PROBLEM SELECTOR PLAN 1
- Dx in 3/2024, in OSH, no Bx performed, no Tx offered, sent home on Hospice   -  ALT 97 Tbili 34.9 on admission; Mixed elevated Direct (>20) and Indirect bili (13.8)  Family would like second opinion on treatment options by Oncology team   - Obtained Imaging records from [facility]   - Significant elevated >200 AFP on outpatient bloodwork     Plan:   Consulted [facility] Oncology for 2nd opinion; not candidate for curative tx at this time  Consulted Hepatology  Palliative Consulted      - Trend AST/ALT   - Lactulose 10 BID titrate to 3-4 BM  - Octreotide 50 and CTX (4/11-) for SBP prophylaxis   - Hold home Spironolactone and Lasix iso hypotension  - Dx and Therapeutic para pending - Dx in 3/2024, in OSH, no Bx performed, no Tx offered, sent home on Hospice   -  ALT 97 Tbili 34.9 on admission; Mixed elevated Direct (>20) and Indirect bili (13.8)  Family would like second opinion on treatment options by Oncology team   - Obtained Imaging records from [facility]   - Significant elevated >200 AFP on outpatient bloodwork     Plan:   Consulted [facility] Oncology for 2nd opinion; not candidate for curative tx at this time  Consulted Hepatology  Palliative Consulted    Ammonia elevated to 100s but mentating well possibly falsely elevated; bm 4x/day    - Trend AST/ALT   - Lactulose 10 BID titrate to 3-4 BM  - Octreotide 50 and CTX (4/11-) 5-day for SBP prophylaxis   - c/w maintenance albumin  - Hold home Spironolactone and Lasix iso hypotension  - Dx and Therapeutic para pending - Dx in 3/2024, in OSH, no Bx performed, no Tx offered, sent home on Hospice   -  ALT 97 Tbili 34.9 on admission; Mixed elevated Direct (>20) and Indirect bili (13.8)  Family would like second opinion on treatment options by Oncology team   - Obtained Imaging records from [facility]   - Significant elevated >200 AFP on outpatient bloodwork;  this admission     Plan:   Consulted [facility] Oncology for 2nd opinion; not candidate for curative tx at this time  Consulted Hepatology  Palliative Consulted    Ammonia elevated to 100s but mentating well possibly falsely elevated; bm 4x/day    - Trend AST/ALT   - Lactulose 10 BID titrate to 3-4 BM  - Octreotide 50 and CTX (4/11-) 5-day for SBP prophylaxis   - c/w maintenance albumin  - Hold home Spironolactone and Lasix iso hypotension  - Dx para pending

## 2024-04-12 NOTE — PROGRESS NOTE ADULT - PROBLEM SELECTOR PLAN 3
- Hypotensive to MAP 50s on admission s/p 3L Bolus and Albumin   - HR persistently low in 50s, not feasible to start midodrine     Plan:   - Albumin 2x for hypotension, limit fluid boluses iso decompensated cirrhosis, Avoid midodrine 2/2 bradycardia  - Hold Lasix and Spironolactone  - Low threshold for micu consult Improving  - Hypotensive to MAP 50s on admission s/p 3L Bolus and Albumin   - HR persistently low in 50s, not feasible to start midodrine     Plan:   - Albumin for hypotension, limit fluid boluses iso decompensated cirrhosis, Avoid midodrine 2/2 bradycardia  - Hold Lasix and Spironolactone  - Low threshold for micu consult Improving  - Hypotensive to MAP 50s on admission s/p 3L Bolus and Albumin   - HR persistently low in 50s, not feasible to start midodrine     Plan:   - Albumin for hypotension and decreased renal perfusion, limit fluid boluses iso decompensated cirrhosis, Avoid midodrine 2/2 bradycardia  - Hold Lasix and Spironolactone  - Low threshold for micu consult

## 2024-04-12 NOTE — DIETITIAN INITIAL EVALUATION ADULT - PROBLEM SELECTOR PLAN 1
- Dx in 3/2024, in OSH, no Bx performed, no Tx offered, sent home on Hospice   -  ALT 97 Tbili 34.9 on admission  Family would like second opinion on treatment options by Oncology team     Plan:   Consult [facility] Oncology for 2nd opinion   Consult Hepatology  Palliative Consult    - F/u Direct indirect Bili   - Trend AST/ALT   - Lactulose 10 BID titrate to 3-4 BM  - Hold home Spironolactone and Lasix iso hypotension

## 2024-04-12 NOTE — PROGRESS NOTE ADULT - ASSESSMENT
74-year-old female with past medical history of recently diagnosed hepatic carcinoma, DM2, HTN, HLD, history of hepatitis C s/p treatment C/B cirrhosis presenting to the ER with weakness, chest tightness and shortness of breath. The patient initially was in [facility] and went to the hospital due to  increasing ascites and dyspnea. 10L fluids were removed. States long-standing hx of Hep C and complicated by cirrhosis. PAtient followed up with hepatology at [facility] and was sent directly to ED given clinical symptoms. The patient was admitted to [facility] for about  a month  and was diagnosed with HCC at that time. No Bx was done at the time. No treatment was offered by oncology team at [facility] and was managed palliatively and discharged with home hospice. Over past few days the patient developed reduced appetite, generalized fatigue. Last week she had paracentesis which removed 2.5L ascitic fluid. This AM she developed chest tightness and shortness of breath.  Reports abdominal discomfort at her baseline.  Denies fever/chills, palpitations, syncope, vomiting, diarrhea, black or bloody stool or any other concerns. [facility] team consulted for GOC.

## 2024-04-12 NOTE — DISCHARGE NOTE PROVIDER - NSDCCPCAREPLAN_GEN_ALL_CORE_FT
PRINCIPAL DISCHARGE DIAGNOSIS  Diagnosis: HCC (hepatocellular carcinoma)  Assessment and Plan of Treatment: CT scan of your abdomen redemonstrated a 1.1x7.7cm lesion of the right hepatic lobe consistent with hepatocellular carcinoma. AFP level was elevated to 258 indicative of hepatocellular carcinoma. You were seen by the Oncology team and Hepatology team. On their evaluation, you were deemed not a candidate for further curative treatment for your liver cancer.      SECONDARY DISCHARGE DIAGNOSES  Diagnosis: Hyponatremia  Assessment and Plan of Treatment: Your sodium levels were low initially. This was likely in the setting of poor oral intake. Please continue with a low salt diet and limit your fluid intake to 1.5L a day. Although your low oral intake was causing low sodium, it is important to eat low sodium diet in order to prevent fluid retention.    Diagnosis: Hypotension  Assessment and Plan of Treatment: Your blood pressure was low initially, likely due to fluids moving from your blood vessels into your tissues due to low albumin. Your blood pressure improved with albumin supplementation.    Diagnosis: Hypoglycemia  Assessment and Plan of Treatment: Your blood sugar was persistently low initially during this admission. We confirmed that you do not have thyroid or adrenal disorders that could have accounted for this hypoglycemia. This was likely due to the glimipiride that you have been taking at home. Please discontinue glimipirde to prevent dangerously low blood sugars.     PRINCIPAL DISCHARGE DIAGNOSIS  Diagnosis: HCC (hepatocellular carcinoma)  Assessment and Plan of Treatment: CT scan of your abdomen redemonstrated a 1.1x7.7cm lesion of the right hepatic lobe consistent with hepatocellular carcinoma. AFP level was elevated to 258 indicative of hepatocellular carcinoma. You were seen by the Oncology team and Hepatology team. On their evaluation, you were deemed not a candidate for further curative treatment for your liver cancer.  Please take 0.5mL (10mg) of morphine every 4 hours as needed if you develop abdominal pain. Please take Haloperidol 0.5mg and melatonin at night for confusion and agitation.      SECONDARY DISCHARGE DIAGNOSES  Diagnosis: Hyponatremia  Assessment and Plan of Treatment: Your sodium levels were low initially. This was likely in the setting of poor oral intake. Please continue with a low salt diet and limit your fluid intake to 1.5L a day. Although your low oral intake was causing low sodium, it is important to eat low sodium diet in order to prevent fluid retention.    Diagnosis: Hypotension  Assessment and Plan of Treatment: Your blood pressure was low initially, likely due to fluids moving from your blood vessels into your tissues due to low albumin. Your blood pressure improved with albumin supplementation.    Diagnosis: Hypoglycemia  Assessment and Plan of Treatment: Your blood sugar was persistently low initially during this admission. We confirmed that you do not have thyroid or adrenal disorders that could have accounted for this hypoglycemia. This was likely due to the glimipiride that you have been taking at home. Please discontinue glimipirde to prevent dangerously low blood sugars.

## 2024-04-12 NOTE — DIETITIAN INITIAL EVALUATION ADULT - OTHER INFO
Observed  lunch meal tray, <50% of intake. Patient reports poor appetite in house. Family brings food from home to her. Patient denies any recent nausea, vomiting, diarrhea, or constipation. Last BM noted on 4/11 per RN flowsheets. Bowel regimen in placed. No reported chewing/swallowing issues. No known food allergies. As per son, patient dislikes lobster, crab and shellfish. Medications notable for antibiotics. Labs reviewed, A1c <4.0% indicating poor control and most likely patient has poor PO intake in the past week, fingersticks within acceptable limit. Discussed importance of having protein dense foods and PO intake to maintain lean body mass. Patient amenable to provision of nutrition shake. Food preferences explored and noted, diet office informed. Educated with. RD to remain available for further nutritional interventions as indicated  Observed lunch meal tray, <50% of intake. Patient reports poor appetite in house. Family brings food from home to her. Patient denies any recent nausea, vomiting, diarrhea, or constipation. Last BM noted on 4/11 per RN flowsheets. Bowel regimen in placed. No reported chewing/swallowing issues. No known food allergies. As per son, patient dislikes lobster, crab and shellfish. Medications notable for antibiotics. Labs reviewed, A1c <4.0% most likely patient has poor PO intake in the past week, fingersticks within acceptable limit. Discussed importance of having protein dense foods and PO intake to maintain lean body mass. Patient amenable to continue Glucerna 2x daily (440kcals, 20g protein) to optimize PO intake. Food preferences explored and noted, diet office informed. RD to remain available for further nutritional interventions as indicated

## 2024-04-12 NOTE — PROGRESS NOTE ADULT - ASSESSMENT
Ms. 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     #Decompensated HCV-related cirrhosis  #Acute kidney injury  #Hepatocellular carcinoma  #Tumor thrombus  #Ascites  #Hyponatremia  Patient with HCV-related cirrhosis, s/p Harvoni (2015) with SVR, likely underlying MASLD as well, follows at [facility]. She had mild constitutional changes over the past year with initial decompensating event of ascites around January 2024 requiring large-volume paracentesis. During a recent hospital stay at [facility], patient was diagnosed with HCC and tumor thrombus, not offered treatment and discharged on home hospice. She returns for symptomatic ascites and second opinion regarding cirrhosis/HCC treatment options.   MELD 3.0 score = 37  HCC: Non-contrast CT with large right hepatic lobe heterogeneous hypoattenuating lesion measuring 11.1 x 7.7 cm, consistent with history of hepatocellular carcinoma. 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 MRI Abdomen performed at [facility] on 3/16/24 was reviewed and notable for a large occlusive tumor thrombus in the main portal vein extending into the right and left portal vein with likely infiltrative HCC. AFP level is 253.   MIGUELANGEL: Creatinine 2.43 from recent baseline of 0.6, urine Na < 20, DDx includes renal hypoperfusion due to dehydration / hypotension, possibly hepatorenal syndrome given decompensated cirrhosis and ascites, possible SBP; somewhat improved with IV albumin/octreotide  Ascites: diagnostic tap performed 4/12 AM with large RBCs, neutrophil count pending; s/p paracentesis x2 prior to admission - 10L in [place] (Feb '24), 2.5L @ [facility] (4/4), on lasix 20 mg and aldactone 25 mg which are held due to MIGUELANGEL  Esophageal varices: noted on prior EGDs, on Coreg 3.125 mg (held due to hypotension/MIGUELANGEL), no prior hemorrhage or banding  Hepatic encephalopathy: none on exam or by history, on lactulose for prevention due to prior constipation     Recommendations:  - Continue IV albumin 25% 100 mL TID and octreotide gtt  - Await neutrophil count from ascites fluid, continue empiric ceftriaxone  - Hold diuretics and beta blocker due to MIGUELANGEL  - Anticoagulation not recommended for tumor thrombus  - Bowel regimen to goal of soft stool daily   - Low sodium diet  - Trend CBC, CMP and PT/INR daily   - No role for ERCP in the absence of biliary duct dilatation. Hyperbilirubinemia is due to cirrhosis and HCC. Bile ducts are normal caliber. Reviewed with advanced GI fellow.   - Due to infiltrative HCC with tumor thrombus, patient is not a candidate for liver transplantation. Patient and family were made aware. All questions and concerns were addressed.   - Appreciate Palliative Care consultation     Ric Colvin MD  Gastroenterology/Hepatology Fellow  Available via Microsoft Teams  Pager: [phone]    NON-URGENT CONSULTS:  Please email [email or web address]  AT NIGHT AND ON WEEKENDS:  Contact on-call GI fellow via answering service ([phone]) from 5pm-8am and on weekends/holidays  MONDAY-FRIDAY 8AM-5PM:  Pager# [phone] ([facility]) or [phone] ([facility]) Ms. 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     #Decompensated HCV-related cirrhosis  #Acute kidney injury  #Hepatocellular carcinoma  #Tumor thrombus  #Ascites  #Hyponatremia  Patient with HCV-related cirrhosis, s/p Harvoni (2015) with SVR, likely underlying MASLD as well, follows at [facility]. She had mild constitutional changes over the past year with initial decompensating event of ascites around January 2024 requiring large-volume paracentesis. During a recent hospital stay at [facility], patient was diagnosed with HCC and tumor thrombus, not offered treatment and discharged on home hospice. She returns for symptomatic ascites and second opinion regarding cirrhosis/HCC treatment options.   MELD 3.0 score = 37  HCC: Non-contrast CT with large right hepatic lobe heterogeneous hypoattenuating lesion measuring 11.1 x 7.7 cm, consistent with history of hepatocellular carcinoma. 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 MRI Abdomen performed at [facility] on 3/16/24 was reviewed and notable for a large occlusive tumor thrombus in the main portal vein extending into the right and left portal vein with likely infiltrative HCC. AFP level is 253. CT Chest with moderate right pleural effusion, metastatic disease cannot be excluded.   MIGUELANGEL: Creatinine 2.43 from recent baseline of 0.6, urine Na < 20, DDx includes renal hypoperfusion due to dehydration / hypotension, possibly hepatorenal syndrome given decompensated cirrhosis and ascites, possible SBP; somewhat improved with IV albumin/octreotide  Ascites: diagnostic tap performed 4/12 AM with large RBCs, neutrophil count pending; s/p paracentesis x2 prior to admission - 10L in [place] (Feb '24), 2.5L @ [facility] (4/4), on lasix 20 mg and aldactone 25 mg which are held due to MIGUELANGEL  Esophageal varices: noted on prior EGDs, on Coreg 3.125 mg (held due to hypotension/MIGUELANGEL), no prior hemorrhage or banding  Hepatic encephalopathy: none on exam or by history, on lactulose for prevention due to prior constipation     Recommendations:  - Continue IV albumin 25% 100 mL TID and octreotide gtt  - Await neutrophil count from ascites fluid, continue empiric ceftriaxone  - Hold diuretics and beta blocker due to MIGUELANGEL  - Anticoagulation not recommended for tumor thrombus  - Bowel regimen to goal of soft stool daily   - Low sodium diet  - Trend CBC, CMP and PT/INR daily   - No role for ERCP in the absence of biliary duct dilatation. Hyperbilirubinemia is due to cirrhosis and HCC. Bile ducts are normal caliber. Reviewed with advanced GI fellow.   - Due to infiltrative HCC with tumor thrombus, patient is not a candidate for liver transplantation. Patient and family were made aware. All questions and concerns were addressed.   - Appreciate Palliative Care consultation     Ric Colvin MD  Gastroenterology/Hepatology Fellow  Available via Microsoft Teams  Pager: [phone]    NON-URGENT CONSULTS:  Please email [email or web address]  [email or web address]  AT NIGHT AND ON WEEKENDS:  Contact on-call GI fellow via answering service ([phone]) from 5pm-8am and on weekends/holidays  MONDAY-FRIDAY 8AM-5PM:  Pager# [phone] ([facility]) or [phone] ([facility]) Ms. 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     #Decompensated HCV-related cirrhosis  #Acute kidney injury  #Hepatocellular carcinoma  #Tumor thrombus  #Ascites  #Hyponatremia  Patient with HCV-related cirrhosis, s/p Harvoni (2015) with SVR, likely underlying MASLD as well, follows at [facility]. She had mild constitutional changes over the past year with initial decompensating event of ascites around January 2024 requiring large-volume paracentesis. During a recent hospital stay at [facility], patient was diagnosed with HCC and tumor thrombus, not offered treatment and discharged on home hospice. She returns for symptomatic ascites and second opinion regarding cirrhosis/HCC treatment options.   MELD 3.0 score = 37  HCC: Non-contrast CT with large right hepatic lobe heterogeneous hypoattenuating lesion measuring 11.1 x 7.7 cm, consistent with history of hepatocellular carcinoma. 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 MRI Abdomen performed at [facility] on 3/16/24 was reviewed and notable for a large occlusive tumor thrombus in the main portal vein extending into the right and left portal vein with likely infiltrative HCC. AFP level is 253. CT Chest with moderate right pleural effusion, metastatic disease cannot be excluded.   MIGUELANGEL: Creatinine 2.43 from recent baseline of 0.6, urine Na < 20, DDx includes renal hypoperfusion due to dehydration / hypotension, possibly hepatorenal syndrome given decompensated cirrhosis and ascites, possible SBP; somewhat improved with IV albumin/octreotide  Ascites: diagnostic tap performed 4/12 AM with large RBCs, neg for SBP after adjustment; s/p paracentesis x2 prior to admission - 10L in [place] (Feb '24), 2.5L @ [facility] (4/4), on lasix 20 mg and aldactone 25 mg which are held due to MIGUELANGEL  Esophageal varices: noted on prior EGDs, on Coreg 3.125 mg (held due to hypotension/MIGUELANGEL), no prior hemorrhage or banding  Hepatic encephalopathy: none on exam or by history, on lactulose for prevention due to prior constipation     Recommendations:  - Continue IV albumin 25% 100 mL TID and octreotide gtt  - Continue empiric ceftriaxone  - Hold diuretics and beta blocker due to MIGUELANGEL  - Anticoagulation not recommended for tumor thrombus  - Bowel regimen to goal of soft stool daily   - Low sodium diet  - Trend CBC, CMP and PT/INR daily   - No role for ERCP in the absence of biliary duct dilatation. Hyperbilirubinemia is due to cirrhosis and HCC. Bile ducts are normal caliber. Reviewed with advanced GI fellow.   - Due to infiltrative HCC with tumor thrombus, patient is not a candidate for liver transplantation. Patient and family were made aware. All questions and concerns were addressed.   - Appreciate Palliative Care consultation     Ric Colvin MD  Gastroenterology/Hepatology Fellow  Available via Microsoft Teams  Pager: [phone]    NON-URGENT CONSULTS:  Please email [email or web address]  [email or web address]  AT NIGHT AND ON WEEKENDS:  Contact on-call GI fellow via answering service ([phone]) from 5pm-8am and on weekends/holidays  MONDAY-FRIDAY 8AM-5PM:  Pager# [phone] ([facility]) or [phone] ([facility])

## 2024-04-12 NOTE — DIETITIAN INITIAL EVALUATION ADULT - NS FNS WEIGHT CHANGE REASON
As per son, patient UBW of ~84kg without timeframe. Current body weight 79.1kg (4/11). As per son, patient likely has weight loss due to pain and lack of appetite. Son states patient weighted 75kg a couple of days ago. Wt gain most likely r/t fluid retention (ascites) and edema. [facility] weight history: 82.1kg(8/30/23); 83.9kg(8/22/23). Continue to monitor weight trend and obtain daily weight/unintentional

## 2024-04-12 NOTE — PROGRESS NOTE ADULT - PROBLEM SELECTOR PLAN 5
Improving  unknown baseline; possibly hepatorenal     -Avoid nephrotoxic agents   -c/w octreotide 50  -Sodium Bicarb for acidosis

## 2024-04-12 NOTE — DIETITIAN INITIAL EVALUATION ADULT - PERTINENT MEDS FT
MEDICATIONS  (STANDING):  albumin human 25% IVPB 100 milliLiter(s) IV Intermittent every 8 hours  cefTRIAXone   IVPB 1000 milliGRAM(s) IV Intermittent every 24 hours  heparin   Injectable 5000 Unit(s) SubCutaneous every 8 hours  influenza  Vaccine (HIGH DOSE) 0.7 milliLiter(s) IntraMuscular once  lactulose Syrup 10 Gram(s) Oral three times a day  octreotide  Infusion 50 MICROgram(s)/Hr (10 mL/Hr) IV Continuous <Continuous>  pantoprazole    Tablet 40 milliGRAM(s) Oral before breakfast  petrolatum white Ointment 1 Application(s) Topical every 6 hours  phytonadione   Solution 5 milliGRAM(s) Oral daily  sodium bicarbonate 650 milliGRAM(s) Oral daily    MEDICATIONS  (PRN):  lidocaine   4% Patch 1 Patch Transdermal daily PRN back Pain  simethicone 80 milliGRAM(s) Chew three times a day PRN Gas

## 2024-04-12 NOTE — PROGRESS NOTE ADULT - CONVERSATION DETAILS
Met with patient and son at bedside. Neptali is requesting holistic nurse visit. He also shared that the oncology team here agrees with [facility] and is not offering treatment at this time. He is aware of hospice services but states he wants to take his mother to see a functional medicine/holistic practitioner and they interfere with hospice services. Pt had an appointment to see a functional medicine physician in the city, however that was the day she came to the emergency room. Pt and family are undecided at this time about initiating hospice services.     Advanced care planning re-addressed and extensively discussed. Reviewed the risks and benefits of resuscitative measures including cardiopulmonary resuscitation and mechanical ventilation at the end of life in the setting of advanced malignancy/illness. Patient understands that these interventions may pose more burden than benefit, however she would like attempt at CPR and mechanical ventilation at this time. The patient is a Full Code. Met with patient and son at bedside. Neptali is requesting holistic nurse visit. He also shared that the oncology team here agrees with [facility] and is not offering treatment at this time. He is aware of hospice services but states he wants to take his mother to see a functional medicine/holistic practitioner and they interfere with hospice services. Pt had an appointment to see a functional medicine physician in the city, however that was the day she came to the emergency room. Pt and family are undecided at this time about initiating hospice services, but understand the services are available if they are interested in the future.     Advanced care planning addressed and extensively discussed. Reviewed the risks and benefits of resuscitative measures including cardiopulmonary resuscitation and mechanical ventilation at the end of life in the setting of advanced malignancy/illness. Patient understands that these interventions may pose more burden than benefit, however she would like an attempt at CPR and mechanical ventilation at this time. The patient is a Full Code.

## 2024-04-12 NOTE — DISCHARGE NOTE PROVIDER - HOSPITAL COURSE
HPI:  74-year-old female with past medical history of recently diagnosed hepatic carcinoma, DM2, HTN, HLD, history of hepatitis C s/p treatment C/B cirrhosis presenting to the ER with weakness, chest tightness and shortness of breath. The patient initially was in [facility] and went to the hospital due to  increasing ascites and dyspnea. 10L fluids were removed. States long-standing hx of Hep C and complicated by cirrhosis. PAtient followed up with hepatology at [facility] and was sent directly to ED given clinical symptoms. The patient was admitted to [facility] for about  a month  and was diagnosed with HCC at that time. No Bx was done at the time. No treatment was offered by oncology team at [facility] and was managed palliatively and discharged with home hospice. Over past few days the patient developed reduced appetite, generalized fatigue. Last week she had paracentesis which removed 2.5L ascitic fluid. This AM she developed chest tightness and shortness of breath.  Reports abdominal discomfort at her baseline.  Denies fever/chills, palpitations, syncope, vomiting, diarrhea, black or bloody stool or any other concerns.     AT [facility] ED the patient vitals were significant for BP of 83/47, pulse in 50s, saturating 99% RA.  (10 Apr 2024 16:07)    Hospital Course:  Initially patient was hypotensive MAP 50s, responded to fluids and albumin. Home Lasix and Aldactone were help iso hypotension. Also found to be hyponatremic which corrected with fluid restriction and CMP showing bilirubin in 30s w/ both elevated Direct and Indirect bilirubin to 20s and 10s respectively. CT abdomen demonstrated 1.1x7.7cm R hepatic lobe lesion consistent with hx of hcc, and did not show any ductal dilation suggestive of obstruction. Hepatology, Oncology, and palliative care were consulted. Oncology was in agreement with [facility] oncology evaluation that the patient would not be a candidate for transplant or chemotherapy at this time, and suggested palliative measures.   Hepatology recommended diagnostic paracentesis which showed ======. The patient was managed with Octreotide, Albumin, and CTX for SBP prophylaxis. Creatinine was initially elevated, which improved with albumin and octreotide. The patient was seen by advanced GI team who did not suggest any intervention given patent biliary system.   The patient was also seen by endocrinology this admission due to hypoglycemia. Patient's Thyroid studies and AM cortisol did not suggest hypothyroidism or adrenal insufficiency as a cause for hypoglycemia, and hypoglycemia was likely due to recent sulfonylurea use.     Important Medication Changes and Reason:    Active or Pending Issues Requiring Follow-up:    Advanced Directives:   [ ] Full code  [ ] DNR  [ ] Hospice    Discharge Diagnoses:  Hepatic cellular carcinoma with decompensated cirrhosis        HPI:  74-year-old female with past medical history of recently diagnosed hepatic carcinoma, DM2, HTN, HLD, history of hepatitis C s/p treatment C/B cirrhosis presenting to the ER with weakness, chest tightness and shortness of breath. The patient initially was in [facility] and went to the hospital due to  increasing ascites and dyspnea. 10L fluids were removed. States long-standing hx of Hep C and complicated by cirrhosis. PAtient followed up with hepatology at [facility] and was sent directly to ED given clinical symptoms. The patient was admitted to [facility] for about  a month  and was diagnosed with HCC at that time. No Bx was done at the time. No treatment was offered by oncology team at [facility] and was managed palliatively and discharged with home hospice. Over past few days the patient developed reduced appetite, generalized fatigue. Last week she had paracentesis which removed 2.5L ascitic fluid. This AM she developed chest tightness and shortness of breath.  Reports abdominal discomfort at her baseline.  Denies fever/chills, palpitations, syncope, vomiting, diarrhea, black or bloody stool or any other concerns.     AT [facility] ED the patient vitals were significant for BP of 83/47, pulse in 50s, saturating 99% RA.  (10 Apr 2024 16:07)    Hospital Course:  Initially patient was hypotensive MAP 50s, responded to fluids and albumin. Home Lasix and Aldactone were help iso hypotension. Also found to be hyponatremic which corrected with fluid restriction and CMP showing bilirubin in 30s w/ both elevated Direct and Indirect bilirubin to 20s and 10s respectively. CT abdomen demonstrated 1.1x7.7cm R hepatic lobe lesion consistent with hx of hcc, and did not show any ductal dilation suggestive of obstruction. Hepatology, Oncology, and palliative care were consulted. Oncology was in agreement with [facility] oncology evaluation that the patient would not be a candidate for transplant or chemotherapy at this time, and suggested palliative measures.   Hepatology recommended diagnostic paracentesis which showed SAAG >1.1 suggestive of ascites 2/2 cirrhosis, without evidence of SBP. The patient was managed with Octreotide, Albumin, and CTX for SBP prophylaxis. Creatinine was initially elevated, which improved with albumin and octreotide. The patient was seen by advanced GI team who did not suggest any intervention given patent biliary system. Hepatology, upon reviewing imaging and patient's AFP, was in agreement with Oncology team that the patient would not be a candidate for any curative therapy, and recommended hospice.  The patient was also seen by endocrinology this admission due to hypoglycemia. Patient's Thyroid studies and AM cortisol did not suggest hypothyroidism or adrenal insufficiency as a cause for hypoglycemia, and hypoglycemia was likely due to recent sulfonylurea use.     Important Medication Changes and Reason:  Morphine 10mg Q4 for pain   Haldol 0.5 QHS for agitation   Lidocaine patch   Stop Sulfonylurea    Active or Pending Issues Requiring Follow-up:    Advanced Directives:   [ ] Full code  [ ] DNR  [x] Hospice    Discharge Diagnoses:  Hepatic cellular carcinoma with decompensated cirrhosis

## 2024-04-12 NOTE — MEDICAL STUDENT PROGRESS NOTE(EDUCATION) - PLAN 3
hypotension, bradycardia, hypoglycemia to resolved wnl  normal adrenals on CT  8AM cortisol   POCT normalized after holding dextrose

## 2024-04-12 NOTE — MEDICAL STUDENT PROGRESS NOTE(EDUCATION) - PLAN 1
MELD score 34  not candidate for targeted therapies due to high Child-Miguel score MELD score 34  ammonia 177  bili 36 with 20 direct  not candidate for targeted therapies due to high Child-Miguel score  outpatient AFP > 200     - diagnostic/therapeutic paracentesis  - empiric ceftriaxone  - lactulose - passed 4 BM   - labs and images from [facility]    consider abdominal catheter for ascites drainage if condition improves

## 2024-04-12 NOTE — PROGRESS NOTE ADULT - SUBJECTIVE AND OBJECTIVE BOX
Date of Service: 04-12-24 @ 13:00    SUBJECTIVE AND OBJECTIVE:  Indication for Geriatrics and Palliative Care Services/INTERVAL HPI: 74-year-old female with past medical history of recently diagnosed hepatic carcinoma, DM2, HTN, HLD, history of hepatitis C s/p treatment C/B cirrhosis presenting to the ER with weakness, chest tightness and shortness of breath.   Palliative Care consulted for complex decision making  related to goals of care discussions in the setting of advanced illness    OVERNIGHT EVENTS: Seen and examined at bedside. Pt denies any current pain. She states she woke up a few times overnight to urinate.    DNR on chart: No  Allergies    penicillin (Other)    Intolerances    MEDICATIONS  (STANDING):  albumin human 25% IVPB 100 milliLiter(s) IV Intermittent every 8 hours  cefTRIAXone   IVPB 1000 milliGRAM(s) IV Intermittent every 24 hours  heparin   Injectable 5000 Unit(s) SubCutaneous every 8 hours  influenza  Vaccine (HIGH DOSE) 0.7 milliLiter(s) IntraMuscular once  lactulose Syrup 10 Gram(s) Oral three times a day  octreotide  Infusion 50 MICROgram(s)/Hr (10 mL/Hr) IV Continuous <Continuous>  pantoprazole    Tablet 40 milliGRAM(s) Oral before breakfast  petrolatum white Ointment 1 Application(s) Topical every 6 hours  phytonadione   Solution 5 milliGRAM(s) Oral daily  sodium bicarbonate 650 milliGRAM(s) Oral daily    MEDICATIONS  (PRN):  lidocaine   4% Patch 1 Patch Transdermal daily PRN back Pain  simethicone 80 milliGRAM(s) Chew three times a day PRN Gas      ITEMS UNCHECKED ARE NOT PRESENT    PRESENT SYMPTOMS: [ ]Unable to self-report - see [ ] CPOT [ ] PAINADS [ ] RDOS  Source if other than patient:  [ ]Family   [ ]Team     Pain:  [ ]yes [x ]no  QOL impact -   Location -                    Aggravating factors -  Quality -  Radiation -  Timing-  Severity (0-10 scale):  Minimal acceptable level (0-10 scale):     CPOT:    https://www.sccm.org/getattachment/[email or web address]/Critical-Care-Pain-Observation-Tool-(CPOT)    PAINAD Score: See PAINAD tool and score below       Dyspnea:                           [ ]Mild [ ]Moderate [ ]Severe    RDOS: See RDOS tool and score below   0 to 2  minimal or no respiratory distress   3  mild distress  4 to 6 moderate distress  >7 severe distress      Anxiety:                             [ ]Mild [ ]Moderate [ ]Severe  Fatigue:                             [ ]Mild [x ]Moderate [ ]Severe  Nausea:                             [ ]Mild [ ]Moderate [ ]Severe  Loss of appetite:              [ ]Mild [ ]Moderate [ ]Severe  Constipation:                    [ ]Mild [ ]Moderate [ ]Severe    PCSSQ[Palliative Care Spiritual Screening Question]   Severity (0-10):  Score of 4 or > indicate consideration of Chaplaincy referral.  Chaplaincy Referral: [ ] yes [ ] refused [ ] following [ x Deferred     Caregiver [place]? : [ ] yes [ x] no [ ] Deferred [ ] Declined             Social work referral [ ] Patient & Family Centered Care Referral [ ]     Anticipatory Grief present?:  [ ] yes [ x] no  [ ] Deferred                  Social work referral [ ] Chaplaincy Referral [ ]    		  Other Symptoms:  [x ]All other review of systems negative     Palliative Performance Status Version 2:   See PPSv2 tool and score below         PHYSICAL EXAM:  Vital Signs Last 24 Hrs  T(C): 36.6 (12 Apr 2024 12:00), Max: 36.9 (11 Apr 2024 23:07)  T(F): 97.9 (12 Apr 2024 12:00), Max: 98.5 (11 Apr 2024 23:07)  HR: 68 (12 Apr 2024 12:00) (68 - 72)  BP: 136/60 (12 Apr 2024 12:00) (122/62 - 136/60)  BP(mean): --  RR: 17 (12 Apr 2024 12:00) (17 - 18)  SpO2: 100% (12 Apr 2024 12:00) (96% - 100%)    Parameters below as of 12 Apr 2024 12:00  Patient On (Oxygen Delivery Method): room air     I&O's Summary    11 Apr 2024 07:01  -  12 Apr 2024 07:00  --------------------------------------------------------  IN: 1120 mL / OUT: 0 mL / NET: 1120 mL       GENERAL: [ ]Cachexia    [x ]Alert  [x ]Oriented    [ x]Lethargic  [ ]Unarousable  [x ]Verbal  [ ]Non-Verbal  Behavioral:   [ ] Anxiety  [ ] Delirium [ ] Agitation [ x] Other- calm  HEENT:  [x ]Normal, except Scleral icterus    [ ]Dry mouth   [ ]ET Tube/Trach  [ ]Oral lesions  PULMONARY:   [ x]Clear [ ]Tachypnea  [ ]Audible excessive secretions   [ ]Rhonchi        [ ]Right [ ]Left [ ]Bilateral  [ ]Crackles        [ ]Right [ ]Left [ ]Bilateral  [ ]Wheezing     [ ]Right [ ]Left [ ]Bilateral  [ ]Diminished breath sounds [ ]right [ ]left [ ]bilateral  CARDIOVASCULAR:    [x ]Regular [ ]Irregular [ ]Tachy  [ ]Wilmar [ ]Murmur [ ]Other  GASTROINTESTINAL:  [ ]Soft  [x ]Distended   [ x]+BS  [ x]Non tender [ ]Tender  [ ]Other [ ]PEG [ ]OGT/ NGT  Last BM: 4/12  GENITOURINARY:  [ x]Normal [ ] Incontinent   [ ]Oliguria/Anuria   [ ]Srinivasan  MUSCULOSKELETAL:   [ ]Normal   [ x]Weakness  [ ]Bed/Wheelchair bound [x ]Edema +2 pitting edema b/l lower ext  NEUROLOGIC:   [ ]No focal deficits  [ ]Cognitive impairment  [ ]Dysphagia [ ]Dysarthria [ ]Paresis [ ]Other   SKIN:   [ ]Normal  [ ]Rash  [x ]Other - extremities with jaundice   [ ]Pressure ulcer(s)       Present on admission [ ]y [ ]n    CRITICAL CARE:  [ ] Shock Present  [ ]Septic [ ]Cardiogenic [ ]Neurologic [ ]Hypovolemic  [ ]  Vasopressors [ ]  Inotropes   [ ]Respiratory failure present [ ]Mechanical ventilation [ ]Non-invasive ventilatory support [ ]High flow    [ ]Acute  [ ]Chronic [ ]Hypoxic  [ ]Hypercarbic [ ]Other  [ x]Other organ failure - Liver    LABS:                        10.2   5.08  )-----------( 104      ( 12 Apr 2024 06:00 )             29.3   04-12    130<L>  |  94<L>  |  49<H>  ----------------------------<  136<H>  3.8   |  16<L>  |  1.94<H>    Ca    8.3<L>      12 Apr 2024 06:00  Phos  4.0     04-12  Mg     2.30     04-12    TPro  4.9<L>  /  Alb  3.4  /  TBili  36.5<H>  /  DBili  x   /  AST  125<H>  /  ALT  67<H>  /  AlkPhos  308<H>  04-12  PT/INR - ( 12 Apr 2024 06:00 )   PT: 21.3 sec;   INR: 1.92 ratio         PTT - ( 12 Apr 2024 06:00 )  PTT:68.4 sec    Urinalysis Basic - ( 12 Apr 2024 06:00 )    Color: x / Appearance: x / SG: x / pH: x  Gluc: 136 mg/dL / Ketone: x  / Bili: x / Urobili: x   Blood: x / Protein: x / Nitrite: x   Leuk Esterase: x / RBC: x / WBC x   Sq Epi: x / Non Sq Epi: x / Bacteria: x      RADIOLOGY & ADDITIONAL STUDIES:    Protein Calorie Malnutrition Present: [ ]mild [ ]moderate [ ]severe [ ]underweight [ ]morbid obesity  https://www.andeal.org/vault/2440/web/files/ONC/Table_Clinical%20Characteristics%20to%20Document%20Malnutrition-White%20JV%20et%20al%202012.pdf    Height (cm): 157 (04-11-24 @ 12:37)  Weight (kg): 79.1 (04-11-24 @ 12:37)  BMI (kg/m2): 32.1 (04-11-24 @ 12:37)    [ ]PPSV2 < or = 30%  [ ]significant weight loss [ ]poor nutritional intake [ ]anasarca[ ]Artificial Nutrition    Other REFERRALS:  [ ]Hospice  [ ]Child Life  [ ]Social Work  [x ]Case management [x ]Holistic Therapy        Date of Service: 04-12-24 @ 13:00    SUBJECTIVE AND OBJECTIVE:  Indication for Geriatrics and Palliative Care Services/INTERVAL HPI: 74-year-old female with past medical history of recently diagnosed hepatic carcinoma, DM2, HTN, HLD, history of hepatitis C s/p treatment C/B cirrhosis presenting to the ER with weakness, chest tightness and shortness of breath.   Palliative Care consulted for complex decision making related to goals of care discussions in the setting of advanced illness    OVERNIGHT EVENTS: Seen and examined at bedside. Pt denies any current pain. She states she woke up a few times overnight to urinate.    DNR on chart: No  Allergies    penicillin (Other)    Intolerances    MEDICATIONS  (STANDING):  albumin human 25% IVPB 100 milliLiter(s) IV Intermittent every 8 hours  cefTRIAXone   IVPB 1000 milliGRAM(s) IV Intermittent every 24 hours  heparin   Injectable 5000 Unit(s) SubCutaneous every 8 hours  influenza  Vaccine (HIGH DOSE) 0.7 milliLiter(s) IntraMuscular once  lactulose Syrup 10 Gram(s) Oral three times a day  octreotide  Infusion 50 MICROgram(s)/Hr (10 mL/Hr) IV Continuous <Continuous>  pantoprazole    Tablet 40 milliGRAM(s) Oral before breakfast  petrolatum white Ointment 1 Application(s) Topical every 6 hours  phytonadione   Solution 5 milliGRAM(s) Oral daily  sodium bicarbonate 650 milliGRAM(s) Oral daily    MEDICATIONS  (PRN):  lidocaine   4% Patch 1 Patch Transdermal daily PRN back Pain  simethicone 80 milliGRAM(s) Chew three times a day PRN Gas      ITEMS UNCHECKED ARE NOT PRESENT    PRESENT SYMPTOMS: [ ]Unable to self-report - see [ ] CPOT [ ] PAINADS [ ] RDOS  Source if other than patient:  [ ]Family   [ ]Team     Pain:  [ ]yes [x ]no  QOL impact -   Location -                    Aggravating factors -  Quality -  Radiation -  Timing-  Severity (0-10 scale):  Minimal acceptable level (0-10 scale):     CPOT:    https://www.sccm.org/getattachment/ixk39v89-3h2g-4z9v-1o4i-2399x5825b9t/Critical-Care-Pain-Observation-Tool-(CPOT)    PAINAD Score: See PAINAD tool and score below       Dyspnea:                           [ ]Mild [ ]Moderate [ ]Severe    RDOS: See RDOS tool and score below   0 to 2  minimal or no respiratory distress   3  mild distress  4 to 6 moderate distress  >7 severe distress      Anxiety:                             [ ]Mild [ ]Moderate [ ]Severe  Fatigue:                             [ ]Mild [x ]Moderate [ ]Severe  Nausea:                             [ ]Mild [ ]Moderate [ ]Severe  Loss of appetite:              [ ]Mild [ ]Moderate [ ]Severe  Constipation:                    [ ]Mild [ ]Moderate [ ]Severe    PCSSQ[Palliative Care Spiritual Screening Question]   Severity (0-10):  Score of 4 or > indicate consideration of Chaplaincy referral.  Chaplaincy Referral: [ ] yes [ ] refused [ ] following [ x Deferred     Caregiver [place]? : [ ] yes [ x] no [ ] Deferred [ ] Declined             Social work referral [ ] Patient & Family Centered Care Referral [ ]     Anticipatory Grief present?:  [ ] yes [ x] no  [ ] Deferred                  Social work referral [ ] Chaplaincy Referral [ ]    		  Other Symptoms:  [x ]All other review of systems negative     Palliative Performance Status Version 2:   See PPSv2 tool and score below         PHYSICAL EXAM:  Vital Signs Last 24 Hrs  T(C): 36.6 (12 Apr 2024 12:00), Max: 36.9 (11 Apr 2024 23:07)  T(F): 97.9 (12 Apr 2024 12:00), Max: 98.5 (11 Apr 2024 23:07)  HR: 68 (12 Apr 2024 12:00) (68 - 72)  BP: 136/60 (12 Apr 2024 12:00) (122/62 - 136/60)  BP(mean): --  RR: 17 (12 Apr 2024 12:00) (17 - 18)  SpO2: 100% (12 Apr 2024 12:00) (96% - 100%)    Parameters below as of 12 Apr 2024 12:00  Patient On (Oxygen Delivery Method): room air     I&O's Summary    11 Apr 2024 07:01  -  12 Apr 2024 07:00  --------------------------------------------------------  IN: 1120 mL / OUT: 0 mL / NET: 1120 mL       GENERAL: [ ]Cachexia    [x ]Alert  [x ]Oriented    [ x]Lethargic  [ ]Unarousable  [x ]Verbal  [ ]Non-Verbal  Behavioral:   [ ] Anxiety  [ ] Delirium [ ] Agitation [ x] Other- calm  HEENT:  [x ]Normal, except Scleral icterus    [ ]Dry mouth   [ ]ET Tube/Trach  [ ]Oral lesions  PULMONARY:   [ x]Clear [ ]Tachypnea  [ ]Audible excessive secretions   [ ]Rhonchi        [ ]Right [ ]Left [ ]Bilateral  [ ]Crackles        [ ]Right [ ]Left [ ]Bilateral  [ ]Wheezing     [ ]Right [ ]Left [ ]Bilateral  [ ]Diminished breath sounds [ ]right [ ]left [ ]bilateral  CARDIOVASCULAR:    [x ]Regular [ ]Irregular [ ]Tachy  [ ]Wilmar [ ]Murmur [ ]Other  GASTROINTESTINAL:  [ ]Soft  [x ]Distended   [ x]+BS  [ x]Non tender [ ]Tender  [ ]Other [ ]PEG [ ]OGT/ NGT  Last BM: 4/12  GENITOURINARY:  [ x]Normal [ ] Incontinent   [ ]Oliguria/Anuria   [ ]Srinivasan  MUSCULOSKELETAL:   [ ]Normal   [ x]Weakness  [ ]Bed/Wheelchair bound [x ]Edema +2 pitting edema b/l lower ext  NEUROLOGIC:   [x ]No focal deficits  [ ]Cognitive impairment  [ ]Dysphagia [ ]Dysarthria [ ]Paresis [ ]Other   SKIN:   [ ]Normal  [ ]Rash  [x ]Other - extremities with jaundice   [ ]Pressure ulcer(s)       Present on admission [ ]y [ ]n    CRITICAL CARE:  [ ] Shock Present  [ ]Septic [ ]Cardiogenic [ ]Neurologic [ ]Hypovolemic  [ ]  Vasopressors [ ]  Inotropes   [ ]Respiratory failure present [ ]Mechanical ventilation [ ]Non-invasive ventilatory support [ ]High flow    [ ]Acute  [ ]Chronic [ ]Hypoxic  [ ]Hypercarbic [ ]Other  [ x]Other organ failure - Liver    LABS:                        10.2   5.08  )-----------( 104      ( 12 Apr 2024 06:00 )             29.3   04-12    130<L>  |  94<L>  |  49<H>  ----------------------------<  136<H>  3.8   |  16<L>  |  1.94<H>    Ca    8.3<L>      12 Apr 2024 06:00  Phos  4.0     04-12  Mg     2.30     04-12    TPro  4.9<L>  /  Alb  3.4  /  TBili  36.5<H>  /  DBili  x   /  AST  125<H>  /  ALT  67<H>  /  AlkPhos  308<H>  04-12  PT/INR - ( 12 Apr 2024 06:00 )   PT: 21.3 sec;   INR: 1.92 ratio         PTT - ( 12 Apr 2024 06:00 )  PTT:68.4 sec    Urinalysis Basic - ( 12 Apr 2024 06:00 )    Color: x / Appearance: x / SG: x / pH: x  Gluc: 136 mg/dL / Ketone: x  / Bili: x / Urobili: x   Blood: x / Protein: x / Nitrite: x   Leuk Esterase: x / RBC: x / WBC x   Sq Epi: x / Non Sq Epi: x / Bacteria: x      RADIOLOGY & ADDITIONAL STUDIES:    Protein Calorie Malnutrition Present: [ ]mild [ ]moderate [ ]severe [ ]underweight [ ]morbid obesity  https://www.andeal.org/vault/2440/web/files/ONC/Table_Clinical%20Characteristics%20to%20Document%20Malnutrition-White%20JV%20et%20al%022597.pdf    Height (cm): 157 (04-11-24 @ 12:37)  Weight (kg): 79.1 (04-11-24 @ 12:37)  BMI (kg/m2): 32.1 (04-11-24 @ 12:37)    [ ]PPSV2 < or = 30%  [ ]significant weight loss [ ]poor nutritional intake [ ]anasarca[ ]Artificial Nutrition    Other REFERRALS:  [ ]Hospice  [ ]Child Life  [ ]Social Work  [x ]Case management [x ]Holistic Therapy        Date of Service: 04-12-24 @ 13:00    SUBJECTIVE AND OBJECTIVE:  Indication for Geriatrics and Palliative Care Services/INTERVAL HPI: 74-year-old female with past medical history of recently diagnosed hepatic carcinoma, DM2, HTN, HLD, history of hepatitis C s/p treatment C/B cirrhosis presenting to the ER with weakness, chest tightness and shortness of breath.   Palliative Care consulted for complex decision making related to goals of care discussions in the setting of advanced illness    OVERNIGHT EVENTS: Seen and examined at bedside. Pt denies any current pain. She states she woke up a few times overnight to urinate.    DNR on chart: No  Allergies    penicillin (Other)    Intolerances    MEDICATIONS  (STANDING):  albumin human 25% IVPB 100 milliLiter(s) IV Intermittent every 8 hours  cefTRIAXone   IVPB 1000 milliGRAM(s) IV Intermittent every 24 hours  heparin   Injectable 5000 Unit(s) SubCutaneous every 8 hours  influenza  Vaccine (HIGH DOSE) 0.7 milliLiter(s) IntraMuscular once  lactulose Syrup 10 Gram(s) Oral three times a day  octreotide  Infusion 50 MICROgram(s)/Hr (10 mL/Hr) IV Continuous <Continuous>  pantoprazole    Tablet 40 milliGRAM(s) Oral before breakfast  petrolatum white Ointment 1 Application(s) Topical every 6 hours  phytonadione   Solution 5 milliGRAM(s) Oral daily  sodium bicarbonate 650 milliGRAM(s) Oral daily    MEDICATIONS  (PRN):  lidocaine   4% Patch 1 Patch Transdermal daily PRN back Pain  simethicone 80 milliGRAM(s) Chew three times a day PRN Gas      ITEMS UNCHECKED ARE NOT PRESENT    PRESENT SYMPTOMS: [ ]Unable to self-report - see [ ] CPOT [ ] PAINADS [ ] RDOS  Source if other than patient:  [ ]Family   [ ]Team     Pain:  [ ]yes [x ]no  QOL impact -   Location -                    Aggravating factors -  Quality -  Radiation -  Timing-  Severity (0-10 scale):  Minimal acceptable level (0-10 scale):     CPOT:    https://www.sccm.org/getattachment/[email or web address]-[email or web address]-4560l4046t2z/Critical-Care-Pain-Observation-Tool-(CPOT)    PAINAD Score: See PAINAD tool and score below       Dyspnea:                           [ ]Mild [ ]Moderate [ ]Severe    RDOS: See RDOS tool and score below   0 to 2  minimal or no respiratory distress   3  mild distress  4 to 6 moderate distress  >7 severe distress      Anxiety:                             [ ]Mild [ ]Moderate [ ]Severe  Fatigue:                             [ ]Mild [x ]Moderate [ ]Severe  Nausea:                             [ ]Mild [ ]Moderate [ ]Severe  Loss of appetite:              [ ]Mild [ ]Moderate [ ]Severe  Constipation:                    [ ]Mild [ ]Moderate [ ]Severe    PCSSQ[Palliative Care Spiritual Screening Question]   Severity (0-10):  Score of 4 or > indicate consideration of Chaplaincy referral.  Chaplaincy Referral: [ ] yes [ ] refused [ ] following [ x Deferred     Caregiver [place]? : [ ] yes [ x] no [ ] Deferred [ ] Declined             Social work referral [ ] Patient & Family Centered Care Referral [ ]     Anticipatory Grief present?:  [ ] yes [ x] no  [ ] Deferred                  Social work referral [ ] Chaplaincy Referral [ ]    		  Other Symptoms:  [x ]All other review of systems negative     Palliative Performance Status Version 2:   See PPSv2 tool and score below         PHYSICAL EXAM:  Vital Signs Last 24 Hrs  T(C): 36.6 (12 Apr 2024 12:00), Max: 36.9 (11 Apr 2024 23:07)  T(F): 97.9 (12 Apr 2024 12:00), Max: 98.5 (11 Apr 2024 23:07)  HR: 68 (12 Apr 2024 12:00) (68 - 72)  BP: 136/60 (12 Apr 2024 12:00) (122/62 - 136/60)  BP(mean): --  RR: 17 (12 Apr 2024 12:00) (17 - 18)  SpO2: 100% (12 Apr 2024 12:00) (96% - 100%)    Parameters below as of 12 Apr 2024 12:00  Patient On (Oxygen Delivery Method): room air     I&O's Summary    11 Apr 2024 07:01  -  12 Apr 2024 07:00  --------------------------------------------------------  IN: 1120 mL / OUT: 0 mL / NET: 1120 mL       GENERAL: [ ]Cachexia    [x ]Alert  [x ]Oriented    [ x]Lethargic  [ ]Unarousable  [x ]Verbal  [ ]Non-Verbal  Behavioral:   [ ] Anxiety  [ ] Delirium [ ] Agitation [ x] Other- calm  HEENT:  [x ]Normal, except Scleral icterus    [ ]Dry mouth   [ ]ET Tube/Trach  [ ]Oral lesions  PULMONARY:   [ x]Clear [ ]Tachypnea  [ ]Audible excessive secretions   [ ]Rhonchi        [ ]Right [ ]Left [ ]Bilateral  [ ]Crackles        [ ]Right [ ]Left [ ]Bilateral  [ ]Wheezing     [ ]Right [ ]Left [ ]Bilateral  [ ]Diminished breath sounds [ ]right [ ]left [ ]bilateral  CARDIOVASCULAR:    [x ]Regular [ ]Irregular [ ]Tachy  [ ]Wilmar [ ]Murmur [ ]Other  GASTROINTESTINAL:  [ ]Soft  [x ]Distended   [ x]+BS  [ x]Non tender [ ]Tender  [ ]Other [ ]PEG [ ]OGT/ NGT  Last BM: 4/12  GENITOURINARY:  [ x]Normal [ ] Incontinent   [ ]Oliguria/Anuria   [ ]Srinivasan  MUSCULOSKELETAL:   [ ]Normal   [ x]Weakness  [ ]Bed/Wheelchair bound [x ]Edema +2 pitting edema b/l lower ext  NEUROLOGIC:   [x ]No focal deficits  [ ]Cognitive impairment  [ ]Dysphagia [ ]Dysarthria [ ]Paresis [ ]Other   SKIN:   [ ]Normal  [ ]Rash  [x ]Other - extremities with jaundice   [ ]Pressure ulcer(s)       Present on admission [ ]y [ ]n    CRITICAL CARE:  [ ] Shock Present  [ ]Septic [ ]Cardiogenic [ ]Neurologic [ ]Hypovolemic  [ ]  Vasopressors [ ]  Inotropes   [ ]Respiratory failure present [ ]Mechanical ventilation [ ]Non-invasive ventilatory support [ ]High flow    [ ]Acute  [ ]Chronic [ ]Hypoxic  [ ]Hypercarbic [ ]Other  [ x]Other organ failure - Liver    LABS:                        10.2   5.08  )-----------( 104      ( 12 Apr 2024 06:00 )             29.3   04-12    130<L>  |  94<L>  |  49<H>  ----------------------------<  136<H>  3.8   |  16<L>  |  1.94<H>    Ca    8.3<L>      12 Apr 2024 06:00  Phos  4.0     04-12  Mg     2.30     04-12    TPro  4.9<L>  /  Alb  3.4  /  TBili  36.5<H>  /  DBili  x   /  AST  125<H>  /  ALT  67<H>  /  AlkPhos  308<H>  04-12  PT/INR - ( 12 Apr 2024 06:00 )   PT: 21.3 sec;   INR: 1.92 ratio         PTT - ( 12 Apr 2024 06:00 )  PTT:68.4 sec    Urinalysis Basic - ( 12 Apr 2024 06:00 )    Color: x / Appearance: x / SG: x / pH: x  Gluc: 136 mg/dL / Ketone: x  / Bili: x / Urobili: x   Blood: x / Protein: x / Nitrite: x   Leuk Esterase: x / RBC: x / WBC x   Sq Epi: x / Non Sq Epi: x / Bacteria: x      RADIOLOGY & ADDITIONAL STUDIES: reviewed     Protein Calorie Malnutrition Present: [ ]mild [ ]moderate [ ]severe [ ]underweight [ ]morbid obesity  https://www.andeal.org/vault/2440/web/files/ONC/Table_Clinical%20Characteristics%20to%20Document%20Malnutrition-White%20JV%20et%20al%575406.pdf    Height (cm): 157 (04-11-24 @ 12:37)  Weight (kg): 79.1 (04-11-24 @ 12:37)  BMI (kg/m2): 32.1 (04-11-24 @ 12:37)    [ ]PPSV2 < or = 30%  [ ]significant weight loss [ ]poor nutritional intake [ ]anasarca[ ]Artificial Nutrition    Other REFERRALS:  [ ]Hospice  [ ]Child Life  [ ]Social Work  [x ]Case management [x ]Holistic Therapy

## 2024-04-12 NOTE — PROGRESS NOTE ADULT - PROBLEM SELECTOR PLAN 6
Hypoglycemic currently iso hepatic failure vs. Sulfonylurea use   Hold home Glimepiride Hypoglycemic currently iso hepatic failure vs. Sulfonylurea use   discontinue home Glimepiride

## 2024-04-12 NOTE — PROGRESS NOTE ADULT - ATTENDING COMMENTS
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  No acute cardiopulmonary disease, found small ascites, MIGUELANGEL creat 2.2, severe hyponatremia Na 129  - MIGUELANGEL improved off diuretics with albumin, octreotide, empiric ceftriaxone  - not a treatment candidate for her advanced HCC, discussed with the patient and her family on the initial encounter  - on lactulose  - can continue with current management, but agree with recommendation of hospice.

## 2024-04-12 NOTE — DIETITIAN INITIAL EVALUATION ADULT - PROBLEM SELECTOR PLAN 4
hypoglycemic to 50s on admission likely iso liver failure     - D10NS   - Diet started   - CTM finger sticks w/ meals

## 2024-04-12 NOTE — DISCHARGE NOTE PROVIDER - NSDCQMSTAIRS_GEN_ALL_CORE
Detail Level: Simple Additional Notes: Discussed in detail Angioma education. Additional Notes: Recommended stop squeezing.  \\n\\nIf no improvement of worsening RTc No

## 2024-04-12 NOTE — CONSULT NOTE ADULT - SUBJECTIVE AND OBJECTIVE BOX
If you have any questions regarding your visit, Please contact your care team.     [place] [email or web address]: [phone]    Women [facility] CLINIC HOURS TELEPHONE NUMBER       DON Murrieta-      Duncan Wills-Medical Assistant       Monday-[place]  8:00a.m-4:45 p.m  Tuesday-[place]  9:00a.m-11:45 a.m  Wednesday-[place] 8:00a.m-4:45 p.m.  Thursday-[place]  8:00a.m-4:45 p.m.  Friday-[place]  8:00a.m-4:45 p.m. [facility]  [address] [place] [address]  [phone] ask [facility]  [phone] Fax  Imaging [phone]    [facility]  [address] [place] [address] [place] [address]  [phone]    [facility]  [address] [place] [address]  [phone] [facility]  [phone] Fax  Imaging [phone]     Urgent Care locations:    [place]        [place] Monday-Friday  5 pm - 9 pm  Saturday and Sunday   9 am - 5 pm    Monday-Friday   11 am - 9 pm  Saturday and Sunday   9 am - 5 pm   [phone]       If you need a medication refill, please contact your pharmacy. Please allow 3 business days for your refill to be completed.  As always, Thank you for trusting us with your healthcare needs!     Interventional Radiology    Evaluate for Procedure: diagnostic paracentesis    HPI: 74y Female with HCC 2/2 chronic Hep C s/p harvoni 2015, decomensated cirrhosis (ascites), CAD (stress test), T2DM, HTN, HLD, with recent admission to [facility] for worsening jaundice, found to have infiltrative HCC w/ extensive tumor thrombus initially discharged to home hospice presents to [facility] with new onset chest tightness as well as 1-2 weeks of worsening lethargy/failure to thrive. CT with small volume ascites.     Allergies: penicillin (Other)    Medications (Abx/Cardiac/Anticoagulation/Blood Products)  cefTRIAXone   IVPB: 100 mL/Hr IV Intermittent (04-11 @ 13:27)  heparin   Injectable: 5000 Unit(s) SubCutaneous (04-11 @ 22:39)    Data:  157  79.1  T(C): 36.6  HR: 68  BP: 123/63  RR: 18  SpO2: 98%    -WBC 5.08 / HgB 10.2 / Hct 29.3 / Plt 104  -Na 130 / Cl 94 / BUN 49 / Glucose 136  -K 3.8 / CO2 16 / Cr 1.94  -ALT 67 / Alk Phos 308 / T.Bili 36.5  -INR 1.92 / PTT 68.4      Assessment/Plan:   -74y Female with HCC 2/2 chronic Hep C s/p harvoni 2015, decomensated cirrhosis (ascites), CAD (stress test), T2DM, HTN, HLD, with recent admission to [facility] for worsening jaundice, found to have infiltrative HCC w/ extensive tumor thrombus initially discharged to home hospice presents to [facility] with new onset chest tightness as well as 1-2 weeks of worsening lethargy/failure to thrive. CT with small volume ascites.         - case reviewed and approved for diagnostic paracentesis today 4/12  - please place IR procedure order under Dr. Dee  - complete pre procedure note  - d/w primary team

## 2024-04-12 NOTE — CHART NOTE - NSCHARTNOTEFT_GEN_A_CORE
Procedure Team    Consulted for a bedside diagnostic and therapeutic paracentesis.  On bedside ultrasound exam there was no safe window for procedure to be done at bedside.  Would recommend IR consult if paracentesis is indicated.    Alejandrina Cortes PA-C  [id number]
Invasive Procedure Team (IPT) consulted for diagnostic/therapeutic paracentesis.    Indications for Diagnostic Paracentesis:  - Consistent with specialty society guidelines, ruling out Bacterial Peritonitis (in particular, SBP) in any admitted patient with ascites (even if asymptomatic), particularly in those with c/f sepsis  - Identifying etiology for new-onset ascites    Indications for Therapeutic Paracentesis:  - Tense ascites associated with abdominal pain, urinary retention, respiratory distress, or other evidence of end-organ compromise that is likely 2/2 the ascites itself (and not another etiology)  - Large ascites refractory to medical management (i.e. diuretics)    Absolute Contraindications:  - DIC  - Acute Abdomen  - Cellulitis    Relative Contraindications:  - Coagulopathy (INR > 2.0 per [facility] IPT policy)  - Severe Thrombocytopenia (Platelet < 20-50, discuss with team/refer to final recommendations below)  - Pregnancy (discuss with team/refer to final recommendations below)  - Cellulitis/Surgical Scarring/Vasculature or Hematoma overlying only safe bedside access site  - MIGUELANGEL (specifically for therapeutic paracteneses; related to already poor intravascular perfusion. Risk of MIGUELANGEL increases by 1.5x per 1L of ascites fluid removed).    ASSESSMENT/RECOMMENDATIONS    74F w/ HCC 2/2 chronic Hep C s/p harvoni 2015, decomensated cirrhosis (ascites), CAD, T2DM, HTN, HLD, with recent admission to [facility] for worsening jaundice, found to have infiltrative HCC w/ extensive tumor thrombus initially discharged to home hospice presents to [facility] with new onset chest tightness as well as 1-2 weeks of worsening lethargy/failure to thrive. On empiric ceftriaxone, octreotide for SBP prophylaxis, procedure team consulted for diagnostic and therapeutic paracentesis.     - Please obtain CBC/PTT/PT/INR/BMP for Cr on morning of procedure  - Please order any requested diagnostic studies on morning of procedure & notify procedure team if cytology requested.  - Primary team will have to physically deliver samples to lab; cannot be sent through pneumatic tube  - Can generally continue DVT/VTE PPx (Policy #: [id number])  - Pt does NOT need to be NPO  - Plan for procedure 4/12, potentially Monday 4/15.   - If repeat therapeutic paracentesis requested for severe / refractory ascites in < 7 day intervals, should consider IR consultation for indwelling catheter placement.
PRE-INTERVENTIONAL RADIOLOGY PROCEDURE NOTE      Patient Age: 74y    Patient Gender: Female    Procedure: Diagnostic paracentesis    Diagnosis/Indication: HCC    Interventional Radiology Attending Physician: Dr. Dee    Ordering Attending Physician: Dr. Melo    Pertinent labs:                      10.2   5.08  )-----------( 104      ( 12 Apr 2024 06:00 )             29.3       04-12    130<L>  |  94<L>  |  49<H>  ----------------------------<  136<H>  3.8   |  16<L>  |  1.94<H>    Ca    8.3<L>      12 Apr 2024 06:00  Phos  4.0     04-12  Mg     2.30     04-12    TPro  4.9<L>  /  Alb  3.4  /  TBili  36.5<H>  /  DBili  x   /  AST  125<H>  /  ALT  67<H>  /  AlkPhos  308<H>  04-12      PT/INR - ( 12 Apr 2024 06:00 )   PT: 21.3 sec;   INR: 1.92 ratio         PTT - ( 12 Apr 2024 06:00 )  PTT:68.4 sec        Patient and Family Aware ? Yes      Juan A Amos MD (Jason)  Internal Medicine PGY-1

## 2024-04-12 NOTE — PROGRESS NOTE ADULT - ATTENDING COMMENTS
Patient was seen and evaluated with Dr. Sauer, Palliative Medicine Fellow, during bedside rounds.   Agree with above findings and recommendations, edited documentation as appropriate to reflect the plan of care discussed with patient and myself with the following additions:    74-year-old female with past medical history of recently diagnosed hepatic carcinoma, DM2, HTN, HLD, history of hepatitis C s/p treatment C/B cirrhosis presenting to the ER with weakness, chest tightness and shortness of breath.   Palliative Care consulted for complex decision making  related to goals of care discussions in the setting of advanced illness    No acute overnight events.   Patient seen with son Isi at bedside today.   Patient with intermittent leg pain described as sciatic like pain with radiation to back. Pain improves with lidocaine patch.     Discussed goals of care and advance directives including, but not limited to code status, hospice.  Patient and son Neptali are aware that patient is not a candidate for DMT per oncologic opinion from [facility] yesterday. Son is aware of hospice services but states he wants to take his mother to see a functional medicine/holistic practitioner instead Pt and family are undecided at this time about initiating hospice services, but understand the services are available if they are interested in the future.   Advanced care planning addressed and extensively discussed. Reviewed the risks and benefits of resuscitative measures including cardiopulmonary resuscitation and mechanical ventilation at the end of life in the setting of advanced malignancy/illness. Reviewed that these interventions may pose more burden than benefit. However, patient would want an attempt at CPR and mechanical ventilation at this time. The patient is a Full Code.  See [facility] note.   16 minutes spent on advanced care planning/goals of care    Patient discussed during oncology interdisciplinary rounds.   Case reviewed with primary team    The patient's symptoms are well controlled. No acute symptoms at this time.   Thank you for allowing us to participate in your patient's care.  Patient to be discharged from [facility] team consult service today.   Please re-consult if we can be of further assistance, page [phone].

## 2024-04-12 NOTE — MEDICAL STUDENT PROGRESS NOTE(EDUCATION) - SUBJECTIVE AND OBJECTIVE BOX
*******************************  Logan Garcia, MS3  Internal Medicine  Contact via [employer] TEAMS  *******************************    PROGRESS NOTE:     Patient is a 74y old  Female who presents with a chief complaint of Weakness and SOB (12 Apr 2024 07:21)      INTERVAL EVENTS: No acute overnight events.     SUBJECTIVE: Patient seen and examined at bedside. per family at bedside no chest pain, palpitations, cough, shortness of breath, vomiting. Ate some steamed veggies and soup and drank juice and water, kept it down, passed a BM.    MEDICATIONS  (STANDING):  albumin human 25% IVPB 100 milliLiter(s) IV Intermittent every 8 hours  cefTRIAXone   IVPB 1000 milliGRAM(s) IV Intermittent every 24 hours  influenza  Vaccine (HIGH DOSE) 0.7 milliLiter(s) IntraMuscular once  lactulose Syrup 10 Gram(s) Oral three times a day  octreotide  Infusion 50 MICROgram(s)/Hr (10 mL/Hr) IV Continuous <Continuous>  pantoprazole    Tablet 40 milliGRAM(s) Oral before breakfast  petrolatum white Ointment 1 Application(s) Topical every 6 hours  sodium bicarbonate 650 milliGRAM(s) Oral daily    MEDICATIONS  (PRN):  lidocaine   4% Patch 1 Patch Transdermal daily PRN back Pain      CAPILLARY BLOOD GLUCOSE      POCT Blood Glucose.: 140 mg/dL (12 Apr 2024 02:03)  POCT Blood Glucose.: 142 mg/dL (11 Apr 2024 17:01)  POCT Blood Glucose.: 115 mg/dL (11 Apr 2024 12:37)  POCT Blood Glucose.: 78 mg/dL (11 Apr 2024 09:08)    I&O's Summary    11 Apr 2024 07:01  -  12 Apr 2024 07:00  --------------------------------------------------------  IN: 1120 mL / OUT: 0 mL / NET: 1120 mL        PHYSICAL EXAM:  Vital Signs Last 24 Hrs  T(C): 36.6 (12 Apr 2024 06:01), Max: 36.9 (11 Apr 2024 23:07)  T(F): 97.8 (12 Apr 2024 06:01), Max: 98.5 (11 Apr 2024 23:07)  HR: 68 (12 Apr 2024 06:01) (61 - 72)  BP: 123/63 (12 Apr 2024 06:01) (104/62 - 125/67)  BP(mean): --  RR: 18 (12 Apr 2024 06:01) (17 - 18)  SpO2: 98% (12 Apr 2024 06:01) (96% - 100%)    Parameters below as of 12 Apr 2024 06:01  Patient On (Oxygen Delivery Method): room air        GENERAL: NAD, lying in bed comfortably  EYES: jaundice   ENT: Moist mucous membranes  NECK: Supple, no JVD  HEART: S1, S2, Regular rate and rhythm, no murmurs, rubs, or gallops  LUNGS: Unlabored respirations, clear to auscultation bilaterally, no crackles, wheezing, or rhonchi  ABDOMEN: Soft, nontender, nondistended, +BS  EXTREMITIES: No clubbing, cyanosis, or edema  NERVOUS SYSTEM:  A&Ox3, no focal deficits   SKIN: No rashes or lesions    LABS:                        10.2   5.08  )-----------( 104      ( 12 Apr 2024 06:00 )             29.3     04-12    130<L>  |  94<L>  |  49<H>  ----------------------------<  136<H>  3.8   |  16<L>  |  1.94<H>    Ca    8.3<L>      12 Apr 2024 06:00  Phos  4.0     04-12  Mg     2.30     04-12    TPro  4.9<L>  /  Alb  3.4  /  TBili  36.5<H>  /  DBili  x   /  AST  125<H>  /  ALT  67<H>  /  AlkPhos  308<H>  04-12    PT/INR - ( 12 Apr 2024 06:00 )   PT: 21.3 sec;   INR: 1.92 ratio         PTT - ( 12 Apr 2024 06:00 )  PTT:68.4 sec      Urinalysis Basic - ( 12 Apr 2024 06:00 )    Color: x / Appearance: x / SG: x / pH: x  Gluc: 136 mg/dL / Ketone: x  / Bili: x / Urobili: x   Blood: x / Protein: x / Nitrite: x   Leuk Esterase: x / RBC: x / WBC x   Sq Epi: x / Non Sq Epi: x / Bacteria: x        Culture - Blood (collected 10 Apr 2024 09:23)  Source: .Blood Blood-Peripheral  Preliminary Report (11 Apr 2024 19:01):    No growth at 24 hours    Culture - Blood (collected 10 Apr 2024 09:23)  Source: .Blood Blood-Venous  Preliminary Report (11 Apr 2024 19:01):    No growth at 24 hours        RADIOLOGY & ADDITIONAL TESTS:  Results Reviewed:   Imaging Personally Reviewed:  Electrocardiogram Personally Reviewed:  Tele: *******************************  Logan Garcia, MS3  Internal Medicine  Contact via [employer] TEAMS  *******************************    PROGRESS NOTE:     Patient is a 74y old Female PMH chronic hep C, CAD, T2DM, HTN, HLD found to have HCC diagnosed in March and d/c with hospice, now presents to [facility] with new-onset FTT and SOB (12 Apr 2024 07:21)      INTERVAL EVENTS: No acute overnight events.     SUBJECTIVE: Patient seen and examined at bedside. per family at bedside no chest pain, palpitations, cough, shortness of breath, vomiting. Ate some steamed veggies and soup and drank juice and water, kept it down, passed a BM. 4 BM since yesterday evening. c/o abdominal discomfort relieved by using bathroom, and pain in her leg attributed to sciatica    MEDICATIONS  (STANDING):  albumin human 25% IVPB 100 milliLiter(s) IV Intermittent every 8 hours  cefTRIAXone   IVPB 1000 milliGRAM(s) IV Intermittent every 24 hours  influenza  Vaccine (HIGH DOSE) 0.7 milliLiter(s) IntraMuscular once  lactulose Syrup 10 Gram(s) Oral three times a day  octreotide  Infusion 50 MICROgram(s)/Hr (10 mL/Hr) IV Continuous <Continuous>  pantoprazole    Tablet 40 milliGRAM(s) Oral before breakfast  petrolatum white Ointment 1 Application(s) Topical every 6 hours  sodium bicarbonate 650 milliGRAM(s) Oral daily    MEDICATIONS  (PRN):  lidocaine   4% Patch 1 Patch Transdermal daily PRN back Pain      CAPILLARY BLOOD GLUCOSE      POCT Blood Glucose.: 140 mg/dL (12 Apr 2024 02:03)  POCT Blood Glucose.: 142 mg/dL (11 Apr 2024 17:01)  POCT Blood Glucose.: 115 mg/dL (11 Apr 2024 12:37)  POCT Blood Glucose.: 78 mg/dL (11 Apr 2024 09:08)    I&O's Summary    11 Apr 2024 07:01  -  12 Apr 2024 07:00  --------------------------------------------------------  IN: 1120 mL / OUT: 0 mL / NET: 1120 mL        PHYSICAL EXAM:  Vital Signs Last 24 Hrs  T(C): 36.6 (12 Apr 2024 06:01), Max: 36.9 (11 Apr 2024 23:07)  T(F): 97.8 (12 Apr 2024 06:01), Max: 98.5 (11 Apr 2024 23:07)  HR: 68 (12 Apr 2024 06:01) (61 - 72)  BP: 123/63 (12 Apr 2024 06:01) (104/62 - 125/67)  BP(mean): --  RR: 18 (12 Apr 2024 06:01) (17 - 18)  SpO2: 98% (12 Apr 2024 06:01) (96% - 100%)    Parameters below as of 12 Apr 2024 06:01  Patient On (Oxygen Delivery Method): room air      GENERAL: NAD, lying in bed comfortably  EYES: jaundice   ENT: Moist mucous membranes, clinically euvolemic. no thrush   HEART: S1, S2, Regular rate and rhythm, no murmurs, rubs, or gallops  LUNGS: Unlabored respirations, clear to auscultation bilaterally  ABDOMEN: distended, nontender, hyperactive BS LLQ  EXTREMITIES: 2+ bilateral pitting edema, warm to the touch  NERVOUS SYSTEM:  alert and oriented, no focal deficits     LABS:                        10.2   5.08  )-----------( 104      ( 12 Apr 2024 06:00 )             29.3     04-12    130<L>  |  94<L>  |  49<H>  ----------------------------<  136<H>  3.8   |  16<L>  |  1.94<H>    Ca    8.3<L>      12 Apr 2024 06:00  Phos  4.0     04-12  Mg     2.30     04-12    TPro  4.9<L>  /  Alb  3.4  /  TBili  36.5<H>  /  DBili  x   /  AST  125<H>  /  ALT  67<H>  /  AlkPhos  308<H>  04-12    PT/INR - ( 12 Apr 2024 06:00 )   PT: 21.3 sec;   INR: 1.92 ratio         PTT - ( 12 Apr 2024 06:00 )  PTT:68.4 sec

## 2024-04-12 NOTE — PROGRESS NOTE ADULT - ATTENDING COMMENTS
74F HCC DM2 hypoglycemia on sulfonylurea. Stop all DM meds.  Will need to dc sulfonylurea. No adrenal insufficiency.  Abnormal TFTs may be due to other nonthyroidal illness no intervention at this time as patient is planned for home hospice.  Complex patient high level decision making. Will sign off please call if questions.    Lashell Bowles MD  Division of Endocrinology  Pager: [phone]    If after 6PM or before 9AM, or on weekends/holidays, please call endocrine answering service for assistance ([phone]).  For nonurgent matters email [email or web address] for assistance.

## 2024-04-12 NOTE — PROGRESS NOTE ADULT - PROBLEM SELECTOR PLAN 4
See GOC above  Full Code  Goal is to follow up with functional medicine/ holistic practitioner upon discharge    - HCP document completed by primary team, placed in chart and confirmed with patient  - Primary is Son Neptali Stewart [phone], Secondary is daughter Andrews Stewart [phone]

## 2024-04-12 NOTE — CONSULT NOTE ADULT - CONSULT REASON
HCC
hypoglycemia, hypotension, hyponatremia, hypoglycemia. R/O AI
HCC, cirrhosis
diagnostic paracentesis
GOC

## 2024-04-12 NOTE — PROGRESS NOTE ADULT - ATTENDING COMMENTS
15 Patient seen and examined with team and family at bedside  Agree with above A/P by Dr Amos  74-year-old female with past medical history of recently diagnosed hepatic carcinoma, DM2, HTN, HLD, history of hepatitis C s/p treatment C/B cirrhosis presenting to the ER with weakness, chest tightness and shortness of breath. The patient initially was in [facility] and went to the hospital due to  increasing ascites and dyspnea. 10L fluids were removed. States long-standing hx of Hep C and complicated by cirrhosis. Patient followed up with hepatology at [facility] and was sent directly to ED given clinical symptoms. The patient was admitted to [facility] for about  a month  and was diagnosed with HCC at that time. No Bx was done at the time. No treatment was offered by oncology team at [facility] and was managed palliatively and discharged with home hospice. Over past few days the patient developed reduced appetite, generalized fatigue. Last week she had paracentesis which removed 2.5L ascitic fluid. This AM she developed chest tightness and shortness of breath.      family has tried to get multiple second opinions. family is interesting in liver dialysis and iv vit c    PE, Very pleasant lady and family by bedside,  and son   Vital Signs Last 24 Hrs  Vital Signs Last 24 Hrs  T(F): 97.9 HR: 68 BP: 136/60 RR: 17 SpO2: 100%  room air   Patient looks comfortable, no pain  HEENT, very jaundiced eyes  Lungs CTA anteriorly, COR  rrr, no m/r/g, , Abd distended but soft n/t, ,Ext neg e/c positive b/l pitting edema to calves. 2 plus, skin jaundiced                             10.2   5.08  )-----------( 104      ( 12 Apr 2024 06:00 )             29.3   04-12    130<L>  |  94<L>  |  49<H>  ----------------------------<  136<H>  3.8   |  16<L>  |  1.94<H>    Ca    8.3<L>      12 Apr 2024 06:00  Phos  4.0     04-12  Mg     2.30     04-12    TPro  4.9<L>  /  Alb  3.4  /  TBili  36.5<H>  /  DBili  x   /  AST  125<H>  /  ALT  67<H>  /  AlkPhos  308<H>  04-12    Pt/PTT/INR 21.1/68/1.9    rad< from: CT Abdomen and Pelvis No Cont (04.10.24 @ 13:43) >  IMPRESSION:  Cirrhosis with portal hypertension. Large right hepaticlobe heterogeneous hypoattenuating lesion measuring   1.1 x 7.7 cm, consistent with history of hepatocellular carcinoma.  Dilated main portal vein measuring up to 3.1 cm. Differentials include   bland versus tumor thrombosis. Addendum can be issued with comparison   with prior studies if available.    A/P  FFT/ Hepatocellular Ca with hyperbilirubinemia w/o evidence of obstruction on Ct, hypoglycemia sec to Liver abn and poor po intake, hyponatremia, hypotension on admission most likely sec to hepatorenal syndrome with elevated creatinine.  # ? SBP doubt. continue with emperic ceftriaxone day 2/5. Ir will do paracentesis today  # Onc , Family wants second opinion. [facility] Onc consult appreciated. no oncological treatment recommended, Palliative/ hospice eval  # Hyponatremia Na 130 improved---> multifactorial . improved with d10ns, c/w d10ns and start glycerna diet. tid po  Do not correct faster than 8 meq in 24 hrs.n obstructive process .no role for ERCP since no cancer treatment possible.  # Hyperbilirubinemia sec to hepatocellular ca. Hyper-coag sec to liver ca. Start po vit k. May need FFP before procedures.  # FFT diet c/w with salt , veg and fish and add glycerna diet tid to diet  # Hypoglycemia on admission sec to sulfourea at home. NO MORE DIABETIC MEDICATION. f/u fs   # RESOLVED hypotension. improved with ivf and albumin . will send AM cortisol. tsh <0.1. Endocrine consult to help with abnormal TSH  # Epigastric pain with eating.  most likely gerd, PPI, keep HOB>30 deg  #creat 2.2---> 1.9 , iv octreotide for hepatorenal syndrome   # Dvt proph hep sq  #GOC HCP form completed Son Neptali Stewart [phone], #2 is daughter Andrews Stewart [phone]  Patient is FULL CODE. stable but guarded prognosis, daughter and son updated.    oncology, hepatology, endocrine, palliative greatly appreciated.  d/w Son Neptali and  at bedside.  They want home with hospice . Planning for home with hospice on Monday after Ceftriaxone ends. All questions answered. d/w patient / family in room (son and ) , on, pal, ir. team and CM

## 2024-04-12 NOTE — PROGRESS NOTE ADULT - ASSESSMENT
The patient is a 74y Female with PMH of recently diagnosed hepatic carcinoma, DM2, HTN, HLD, history of hepatitis C s/p treatment C/B cirrhosis presenting to the ER with weakness, chest tightness and shortness of breath.  Endocrinology consulted due to concern for AI.    #R/O Adrenal insufficiency  - Patient hypotensive (BP 68/48), bradycardic (HR 44), hypoglycemia (glucose 35 on BMP), and hyponatremic (Na 125) on this admission. Temperature normal. Potassium normal.   - No personal or known family history of adrenal or thyroid issues.   - CT A/P this admission with normal adrenal glands.   - Cirrhosis can be associated with a relative adrenal insufficiency, though treatment is controversial  PLAN  - 8AM cortisol 22.9 -> adrenal insufficiency ruled out  - no role for steroids    #T2DM  #Hypoglycemia  - home regimen: glimepiride 2mg BID and farxiga 5mg daily.  - eGFR: 23 mL/min/1.73m2 (04-11-24)  - Weight (kg): 79.1 (04-11-24)  - HgbA1c <4%  - glucose 35 on BMP this admission, now improving, likely related to glimepiride use given elevated c-peptide of 11.    INPATIENT PLAN:  - follow up sulfonylurea screen - pending  - monitor off insulin  - encourage PO intake  - Please check FSG before meals and QHS, or q6h while NPO  - Inpatient glucose goals: 100-180  - Discharge recs pending clinical course: DC farxiga and glimepiride given A1c <4%    Donavon Witt MD  Endocrine Fellow  For nonurgent matters (including consults or followup questions), please email [email or web address] or [email or web address]. For urgent matters, please call answering service at [phone] (weekdays), [phone] (nights/weekends).    The patient is a 74y Female with PMH of recently diagnosed hepatic carcinoma, DM2, HTN, HLD, history of hepatitis C s/p treatment C/B cirrhosis presenting to the ER with weakness, chest tightness and shortness of breath.  Endocrinology consulted due to concern for AI.    #R/O Adrenal insufficiency  - Patient hypotensive (BP 68/48), bradycardic (HR 44), hypoglycemia (glucose 35 on BMP), and hyponatremic (Na 125) on this admission. Temperature normal. Potassium normal.   - No personal or known family history of adrenal or thyroid issues.   - CT A/P this admission with normal adrenal glands.   - Cirrhosis can be associated with a relative adrenal insufficiency, though treatment is controversial  PLAN  - 8AM cortisol 22.9 -> adrenal insufficiency ruled out  - no role for steroids    #T2DM  #Hypoglycemia  - home regimen: glimepiride 2mg BID and farxiga 5mg daily.  - eGFR: 23 mL/min/1.73m2 (04-11-24)  - Weight (kg): 79.1 (04-11-24)  - HgbA1c <4%  - glucose 35 on BMP this admission, now improving, likely related to glimepiride use given elevated c-peptide of 11.    INPATIENT PLAN:  - follow up sulfonylurea screen - pending  - monitor off insulin  - encourage PO intake  - Please check FSG before meals and QHS, or q6h while NPO  - Inpatient glucose goals: 100-180  - Discharge recs pending clinical course: STOP farxiga and glimepiride given A1c <4%    #Abnormal TFTs  - TSH <0.1, Free T4 1.2  - likely 2/2 sick euthyroid syndrome vs steroid use, less likely subclinical hyperthyroidism  - would not pursue additional workup or treatment at this time given [facility].     Donavon Witt MD  Endocrine Fellow  For nonurgent matters (including consults or followup questions), please email [email or web address] or [email or web address]. For urgent matters, please call answering service at [phone] (weekdays), [phone] (nights/weekends).    The patient is a 74y Female with PMH of recently diagnosed hepatic carcinoma, DM2, HTN, HLD, history of hepatitis C s/p treatment C/B cirrhosis presenting to the ER with weakness, chest tightness and shortness of breath.  Endocrinology consulted due to concern for AI.    #R/O Adrenal insufficiency  - Patient hypotensive (BP 68/48), bradycardic (HR 44), hypoglycemia (glucose 35 on BMP), and hyponatremic (Na 125) on this admission. Temperature normal. Potassium normal.   - No personal or known family history of adrenal or thyroid issues.   - CT A/P this admission with normal adrenal glands.   - Cirrhosis can be associated with a relative adrenal insufficiency, though treatment is controversial  PLAN  - 8AM cortisol 22.9 -> adrenal insufficiency ruled out  - no role for steroids    #T2DM  #Hypoglycemia  - home regimen: glimepiride 2mg BID and farxiga 5mg daily.  - eGFR: 23 mL/min/1.73m2 (04-11-24)  - Weight (kg): 79.1 (04-11-24)  - HgbA1c <4%  - glucose 35 on BMP this admission, now improving, likely related to glimepiride use given elevated c-peptide of 11.    INPATIENT PLAN:  - follow up sulfonylurea screen - pending  - monitor off insulin  - encourage PO intake  - Please check FSG before meals and QHS, or q6h while NPO  - Inpatient glucose goals: 100-180  - Discharge recs pending clinical course: STOP farxiga and glimepiride given A1c <4%    #Abnormal TFTs  - TSH <0.1, Free T4 1.2  - likely 2/2 sick euthyroid syndrome vs steroid use, less likely subclinical hyperthyroidism  - would not pursue additional workup or treatment at this time given [facility].     Endocrinology team will sign off at this time. Please feel free to reconsult as needed.     Donavon Witt MD  Endocrine Fellow  For nonurgent matters (including consults or followup questions), please email [email or web address] or [email or web address]. For urgent matters, please call answering service at [phone] (weekdays), [phone] (nights/weekends).    The patient is a 74y Female with PMH of recently diagnosed hepatic carcinoma, DM2, HTN, HLD, history of hepatitis C s/p treatment C/B cirrhosis presenting to the ER with weakness, chest tightness and shortness of breath.  Endocrinology consulted due to concern for AI.    #R/O Adrenal insufficiency  - Patient hypotensive (BP 68/48), bradycardic (HR 44), hypoglycemia (glucose 35 on BMP), and hyponatremic (Na 125) on this admission. Temperature normal. Potassium normal.   - No personal or known family history of adrenal or thyroid issues.   - CT A/P this admission with normal adrenal glands.   - Cirrhosis can be associated with a relative adrenal insufficiency, though treatment is controversial  PLAN  - 8AM cortisol 22.9 -> adrenal insufficiency ruled out  - no role for steroids    #T2DM  #Hypoglycemia  - home regimen: glimepiride 2mg BID and farxiga 5mg daily.  - eGFR: 23 mL/min/1.73m2 (04-11-24)  - Weight (kg): 79.1 (04-11-24)  - HgbA1c <4%  - glucose 35 on BMP this admission, now improving, likely related to glimepiride use given elevated c-peptide of 11.    INPATIENT PLAN:  - follow up sulfonylurea screen - pending but as patient was known to be on sulfonylurea likely not to change plan.  - monitor off insulin  - encourage PO intake  - Please check FSG before meals and QHS, or q6h while NPO  - Inpatient glucose goals: 100-180  - Discharge: STOP farxiga and glimepiride given A1c <4%  Discharge without diabetes meds    #Abnormal TFTs  - TSH <0.1, Free T4 1.2  - likely 2/2 sick euthyroid syndrome vs steroid use, less likely subclinical hyperthyroidism  - would not pursue additional workup or treatment at this time given [facility].     Endocrinology team will sign off at this time. Please feel free to reconsult as needed.     Donavon Witt MD  Endocrine Fellow  For nonurgent matters (including consults or followup questions), please email [email or web address] or [email or web address]. For urgent matters, please call answering service at [phone] (weekdays), [phone] (nights/weekends).

## 2024-04-12 NOTE — PROGRESS NOTE ADULT - SUBJECTIVE AND OBJECTIVE BOX
Progress Note  Authored by:   Juan A Amos MD   PGY-1 Internal Medicine    04-10-24 (2d)    Patient is a 74y old  Female who presents with a chief complaint of Weakness and SOB (11 Apr 2024 14:13)      Subjective / Overnight Events :  - No acute events overnight.  - Pt seen and examined at bedside.     Additional ROS (if any):    MEDICATIONS  (STANDING):  albumin human 25% IVPB 100 milliLiter(s) IV Intermittent every 8 hours  cefTRIAXone   IVPB 1000 milliGRAM(s) IV Intermittent every 24 hours  influenza  Vaccine (HIGH DOSE) 0.7 milliLiter(s) IntraMuscular once  lactulose Syrup 10 Gram(s) Oral three times a day  octreotide  Infusion 50 MICROgram(s)/Hr (10 mL/Hr) IV Continuous <Continuous>  pantoprazole    Tablet 40 milliGRAM(s) Oral before breakfast  petrolatum white Ointment 1 Application(s) Topical every 6 hours  sodium bicarbonate 650 milliGRAM(s) Oral daily    MEDICATIONS  (PRN):  lidocaine   4% Patch 1 Patch Transdermal daily PRN back Pain          PHYSICAL EXAM:  Vital Signs Last 24 Hrs  T(C): 36.6 (12 Apr 2024 06:01), Max: 36.9 (11 Apr 2024 23:07)  T(F): 97.8 (12 Apr 2024 06:01), Max: 98.5 (11 Apr 2024 23:07)  HR: 68 (12 Apr 2024 06:01) (61 - 72)  BP: 123/63 (12 Apr 2024 06:01) (104/62 - 125/67)  BP(mean): --  RR: 18 (12 Apr 2024 06:01) (17 - 18)  SpO2: 98% (12 Apr 2024 06:01) (96% - 100%)    Parameters below as of 12 Apr 2024 06:01  Patient On (Oxygen Delivery Method): room air        I&O's Summary    11 Apr 2024 07:01  -  12 Apr 2024 07:00  --------------------------------------------------------  IN: 1120 mL / OUT: 0 mL / NET: 1120 mL        General: NAD, non-toxic appearing   HEENT: EOMi, scleral icterus   CV: RRR, normal S1 and S2, no m/r/g  Lungs: normal respiratory effort. CTAB, no wheezes, rales, or rhonchi  Abd: soft, nontender, distended  Ext: AC 2+ edema, 2+ peripheral pulses   Pysch: AOx4, appropriate affect   Neuro: grossly non-focal, moving all extremities spontaneously   Skin: no rashes or lesions, jaundice     LABS:  CAPILLARY BLOOD GLUCOSE      POCT Blood Glucose.: 140 mg/dL (12 Apr 2024 02:03)  POCT Blood Glucose.: 142 mg/dL (11 Apr 2024 17:01)  POCT Blood Glucose.: 115 mg/dL (11 Apr 2024 12:37)  POCT Blood Glucose.: 78 mg/dL (11 Apr 2024 09:08)                              10.2   5.08  )-----------( 104      ( 12 Apr 2024 06:00 )             29.3       WBC Trend: 5.08<--, 5.61<--, 7.05<--  Hb Trend: 10.2<--, 10.4<--, 12.1<--, 12.5<--    04-12    130<L>  |  94<L>  |  49<H>  ----------------------------<  136<H>  3.8   |  16<L>  |  1.94<H>    Ca    8.3<L>      12 Apr 2024 06:00  Phos  4.0     04-12  Mg     2.30     04-12    TPro  4.9<L>  /  Alb  3.4  /  TBili  36.5<H>  /  DBili  x   /  AST  125<H>  /  ALT  67<H>  /  AlkPhos  308<H>  04-12    PT/INR - ( 12 Apr 2024 06:00 )   PT: 21.3 sec;   INR: 1.92 ratio         PTT - ( 12 Apr 2024 06:00 )  PTT:68.4 sec      Urinalysis Basic - ( 12 Apr 2024 06:00 )    Color: x / Appearance: x / SG: x / pH: x  Gluc: 136 mg/dL / Ketone: x  / Bili: x / Urobili: x   Blood: x / Protein: x / Nitrite: x   Leuk Esterase: x / RBC: x / WBC x   Sq Epi: x / Non Sq Epi: x / Bacteria: x        Culture - Blood (collected 10 Apr 2024 09:23)  Source: .Blood Blood-Peripheral  Preliminary Report (11 Apr 2024 19:01):    No growth at 24 hours    Culture - Blood (collected 10 Apr 2024 09:23)  Source: .Blood Blood-Venous  Preliminary Report (11 Apr 2024 19:01):    No growth at 24 hours          RADIOLOGY & ADDITIONAL TESTS: Reviewed

## 2024-04-12 NOTE — PROGRESS NOTE ADULT - SUBJECTIVE AND OBJECTIVE BOX
ENDOCRINE FOLLOW UP     Chief Complaint: concern for AI    History: AM cortisol 22.9, ruling out AI. Glucose improving, off dextrose. Patient seen at bedside, no complaints.    MEDICATIONS  (STANDING):  albumin human 25% IVPB 100 milliLiter(s) IV Intermittent every 8 hours  cefTRIAXone   IVPB 1000 milliGRAM(s) IV Intermittent every 24 hours  heparin   Injectable 5000 Unit(s) SubCutaneous every 8 hours  influenza  Vaccine (HIGH DOSE) 0.7 milliLiter(s) IntraMuscular once  lactulose Syrup 10 Gram(s) Oral three times a day  octreotide  Infusion 50 MICROgram(s)/Hr (10 mL/Hr) IV Continuous <Continuous>  pantoprazole    Tablet 40 milliGRAM(s) Oral before breakfast  petrolatum white Ointment 1 Application(s) Topical every 6 hours  phytonadione   Solution 5 milliGRAM(s) Oral daily  sodium bicarbonate 650 milliGRAM(s) Oral daily    MEDICATIONS  (PRN):  lidocaine   4% Patch 1 Patch Transdermal daily PRN back Pain  simethicone 80 milliGRAM(s) Chew three times a day PRN Gas      Allergies    penicillin (Other)    Intolerances        ROS: All other systems reviewed and negative    PHYSICAL EXAM:  VITALS: T(C): 36.6 (04-12-24 @ 12:00)  T(F): 97.9 (04-12-24 @ 12:00), Max: 98.5 (04-11-24 @ 23:07)  HR: 68 (04-12-24 @ 12:00) (68 - 72)  BP: 136/60 (04-12-24 @ 12:00) (122/62 - 136/60)  RR:  (17 - 18)  SpO2:  (96% - 100%)  Wt(kg): --  GENERAL: NAD, resting comfortably   EYES: No proptosis,  anicteric  HEENT:  Atraumatic, Normocephalic, moist mucous membranes  RESPIRATORY: Nonlabored respirations on room air, normal rate/effort   NEURO: AOx3, moves all extremities spontaenuously   PSYCH:  reactive affect, euthymic mood    POCT Blood Glucose.: 169 mg/dL (04-12-24 @ 12:37)  POCT Blood Glucose.: 162 mg/dL (04-12-24 @ 08:23)  POCT Blood Glucose.: 140 mg/dL (04-12-24 @ 02:03)  POCT Blood Glucose.: 142 mg/dL (04-11-24 @ 17:01)  POCT Blood Glucose.: 115 mg/dL (04-11-24 @ 12:37)  POCT Blood Glucose.: 78 mg/dL (04-11-24 @ 09:08)  POCT Blood Glucose.: 101 mg/dL (04-11-24 @ 02:38)  POCT Blood Glucose.: 51 mg/dL (04-11-24 @ 02:34)  POCT Blood Glucose.: 62 mg/dL (04-11-24 @ 02:10)  POCT Blood Glucose.: 60 mg/dL (04-11-24 @ 02:06)  POCT Blood Glucose.: 73 mg/dL (04-11-24 @ 00:15)  POCT Blood Glucose.: 82 mg/dL (04-10-24 @ 19:08)  POCT Blood Glucose.: 111 mg/dL (04-10-24 @ 18:01)  POCT Blood Glucose.: 64 mg/dL (04-10-24 @ 17:46)  POCT Blood Glucose.: 118 mg/dL (04-10-24 @ 11:20)  POCT Blood Glucose.: 140 mg/dL (04-10-24 @ 09:57)  POCT Blood Glucose.: 144 mg/dL (04-10-24 @ 09:31)  POCT Blood Glucose.: 54 mg/dL (04-10-24 @ 09:17)  POCT Blood Glucose.: 47 mg/dL (04-10-24 @ 08:58)      04-12    130<L>  |  94<L>  |  49<H>  ----------------------------<  136<H>  3.8   |  16<L>  |  1.94<H>    eGFR: 26<L>    Ca    8.3<L>      04-12  Mg     2.30     04-12  Phos  4.0     04-12    TPro  4.9<L>  /  Alb  3.4  /  TBili  36.5<H>  /  DBili  x   /  AST  125<H>  /  ALT  67<H>  /  AlkPhos  308<H>  04-12      A1C with Estimated Average Glucose Result: <4.0 % (04-11-24 @ 06:58)      Thyroid Stimulating Hormone, Serum: <0.10 uIU/mL (04-10-24 @ 09:23)

## 2024-04-12 NOTE — DIETITIAN INITIAL EVALUATION ADULT - ADD RECOMMEND
1.Continue current diet order and oral supplement to optimize PO intake  2.Monitor PO intake, Labs, weights, BMs, and skin integrity.   3.Please consistently document %PO intake in nursing flowsheets  4.Encourage PO intake and honor food preferences

## 2024-04-12 NOTE — DISCHARGE NOTE PROVIDER - NSDCCPTREATMENT_GEN_ALL_CORE_FT
PRINCIPAL PROCEDURE  Procedure: Paracentesis, abdominal, diagnostic, initial  Findings and Treatment:       SECONDARY PROCEDURE  Procedure: Abdominal CT with and without contrast  Findings and Treatment:   < end of copied text >  IMPRESSION:  Cirrhosis with portal hypertension.  Large right hepaticlobe heterogeneous hypoattenuating lesion measuring   1.1 x 7.7 cm, consistent with history of hepatocellular carcinoma.  Dilated main portal vein measuring up to 3.1 cm. Differentials include   bland versus tumor thrombosis. Addendum can be issued with comparison   with prior studies if available.  Findings were discussed with CELENA GATES [id number] 4/10/2024   1:58 PM by Dr. James with read back confirmation.< from: CT Abdomen and Pelvis No Cont (04.10.24 @ 13:43) >      Procedure: Serum AFP  Findings and Treatment: Alpha Fetoprotein - Tumor Marker (04.12.24 @ 06:00)   Alpha Fetoprotein - Tumor Marker: 258.0: Method: Roche Electrochemiluminescence Immuno Assay   Values obtained with different assay methods or kits cannot be   used interchangeably.   Results cannot be interpreted as absolute evidence of the presence   or absence of malignant disease.   AFP values are not interpretable in pregnant females. ng/mL     PRINCIPAL PROCEDURE  Procedure: Paracentesis, abdominal, diagnostic, initial  Findings and Treatment: Cell Count, Body Fluid (04.12.24 @ 17:05)   Mesothelial Cells - Body Fluid: 0 %  Fluid Source: Ascites  Tube Type: Lavender  Fluid Type: Ascites Fluid  Body Fluid Appearance: Clear  Color - Body Fluid: Yellow  Total Nucleated Cell Count, Body Fluid: 103: Reference Ranges:   Synovial Fluid   Total nucleated cells < 150 cells/uL   Neutrophils <25%   Lymphocytes 10-15%   Monocytes/Macrophages   Other parameters- No established reference ranges.   Bronchoalveolar lavage   Macrophages >85%   Lymphocytes 10-15%   Neutrophils <3%   Eosinophils <1%   Other parameters- No established reference ranges.   Peritoneal/pleural/pericardial fluid/other body fluid types   No established reference ranges. cells/uL  Total RBC Count: <2000: Red Cell count correlates with the number and proportion of cells on   cytospin preparation. cells/uL  Neutrophils-Body Fluid: 13 %  BF Lymphocytes: 32 %  Atypical Lymphocytes - Body Fluid: 0 %  Monocyte/Macrophage Count - Body Fluid: 55 %  Eosinophil Count - Body Fluid: 0 %  Other Body Cells: 0 %  Total Cells Counted, Body Fluid: 100 Cells      SECONDARY PROCEDURE  Procedure: Abdominal CT with and without contrast  Findings and Treatment:   < end of copied text >  IMPRESSION:  Cirrhosis with portal hypertension.  Large right hepaticlobe heterogeneous hypoattenuating lesion measuring   1.1 x 7.7 cm, consistent with history of hepatocellular carcinoma.  Dilated main portal vein measuring up to 3.1 cm. Differentials include   bland versus tumor thrombosis. Addendum can be issued with comparison   with prior studies if available.  Findings were discussed with CELENA GATES [id number] 4/10/2024   1:58 PM by Dr. James with read back confirmation.< from: CT Abdomen and Pelvis No Cont (04.10.24 @ 13:43) >      Procedure: Serum AFP  Findings and Treatment: Alpha Fetoprotein - Tumor Marker (04.12.24 @ 06:00)   Alpha Fetoprotein - Tumor Marker: 258.0: Method: Roche Electrochemiluminescence Immuno Assay   Values obtained with different assay methods or kits cannot be   used interchangeably.   Results cannot be interpreted as absolute evidence of the presence   or absence of malignant disease.   AFP values are not interpretable in pregnant females. ng/mL

## 2024-04-12 NOTE — PROGRESS NOTE ADULT - SUBJECTIVE AND OBJECTIVE BOX
Interval Events:   Records from [facility] were received and reviewed including MRI abdomen, CT chest and labs   Patient reports feeling well with less SOB, no other complaints  Patient's BP and HR are improved, along with hypoglycemia  Creatinine and Na are improving with albumin    Hospital Medications:  albumin human 25% IVPB 100 milliLiter(s) IV Intermittent every 8 hours  cefTRIAXone   IVPB 1000 milliGRAM(s) IV Intermittent every 24 hours  influenza  Vaccine (HIGH DOSE) 0.7 milliLiter(s) IntraMuscular once  lactulose Syrup 10 Gram(s) Oral three times a day  lidocaine   4% Patch 1 Patch Transdermal daily PRN  octreotide  Infusion 50 MICROgram(s)/Hr IV Continuous <Continuous>  pantoprazole    Tablet 40 milliGRAM(s) Oral before breakfast  petrolatum white Ointment 1 Application(s) Topical every 6 hours  simethicone 80 milliGRAM(s) Chew three times a day PRN  sodium bicarbonate 650 milliGRAM(s) Oral daily    ROS: See above.    PHYSICAL EXAM:   Vital Signs:  Vital Signs Last 24 Hrs  T(C): 36.6 (12 Apr 2024 06:01), Max: 36.9 (11 Apr 2024 23:07)  T(F): 97.8 (12 Apr 2024 06:01), Max: 98.5 (11 Apr 2024 23:07)  HR: 68 (12 Apr 2024 06:01) (61 - 72)  BP: 123/63 (12 Apr 2024 06:01) (115/57 - 125/67)  BP(mean): --  RR: 18 (12 Apr 2024 06:01) (17 - 18)  SpO2: 98% (12 Apr 2024 06:01) (96% - 100%)    Parameters below as of 12 Apr 2024 06:01  Patient On (Oxygen Delivery Method): room air      Daily Height in cm: 157 (11 Apr 2024 12:37)    Daily     GENERAL:  NAD, resting comfortably in chair  HEENT:  sclera icteric  CHEST:  Normal Effort  HEART:  HDS  ABDOMEN:  Soft, non-tender, distended  SKIN:  Warm & Dry. Jaundice  NEURO:  Alert, conversant, no focal deficit    LABS:                        10.2   5.08  )-----------( 104      ( 12 Apr 2024 06:00 )             29.3     Mean Cell Volume: 97.0 fL (04-12-24 @ 06:00)    04-12    130<L>  |  94<L>  |  49<H>  ----------------------------<  136<H>  3.8   |  16<L>  |  1.94<H>    Ca    8.3<L>      12 Apr 2024 06:00  Phos  4.0     04-12  Mg     2.30     04-12    TPro  4.9<L>  /  Alb  3.4  /  TBili  36.5<H>  /  DBili  x   /  AST  125<H>  /  ALT  67<H>  /  AlkPhos  308<H>  04-12    LIVER FUNCTIONS - ( 12 Apr 2024 06:00 )  Alb: 3.4 g/dL / Pro: 4.9 g/dL / ALK PHOS: 308 U/L / ALT: 67 U/L / AST: 125 U/L / GGT: x           PT/INR - ( 12 Apr 2024 06:00 )   PT: 21.3 sec;   INR: 1.92 ratio         PTT - ( 12 Apr 2024 06:00 )  PTT:68.4 sec  Urinalysis Basic - ( 12 Apr 2024 06:00 )    Color: x / Appearance: x / SG: x / pH: x  Gluc: 136 mg/dL / Ketone: x  / Bili: x / Urobili: x   Blood: x / Protein: x / Nitrite: x   Leuk Esterase: x / RBC: x / WBC x   Sq Epi: x / Non Sq Epi: x / Bacteria: x      Amylase Serum--      Lipase serum--       [id number]                          10.2   5.08  )-----------( 104      ( 12 Apr 2024 06:00 )             29.3                         10.4   5.61  )-----------( 108      ( 11 Apr 2024 06:58 )             29.9                         12.1   7.05  )-----------( 152      ( 10 Apr 2024 19:38 )             35.6                         12.5   9.28  )-----------( 174      ( 10 Apr 2024 09:23 )             35.5

## 2024-04-13 DIAGNOSIS — R41.82 ALTERED MENTAL STATUS, UNSPECIFIED: ICD-10-CM

## 2024-04-13 LAB
ALBUMIN SERPL ELPH-MCNC: 4 G/DL — SIGNIFICANT CHANGE UP (ref 3.3–5)
ALP SERPL-CCNC: 256 U/L — HIGH (ref 40–120)
ALT FLD-CCNC: 48 U/L — HIGH (ref 4–33)
ANION GAP SERPL CALC-SCNC: 20 MMOL/L — HIGH (ref 7–14)
ANISOCYTOSIS BLD QL: SIGNIFICANT CHANGE UP
AST SERPL-CCNC: 84 U/L — HIGH (ref 4–32)
BASOPHILS # BLD AUTO: 0 K/UL — SIGNIFICANT CHANGE UP (ref 0–0.2)
BASOPHILS NFR BLD AUTO: 0 % — SIGNIFICANT CHANGE UP (ref 0–2)
BILIRUB SERPL-MCNC: 38.3 MG/DL — HIGH (ref 0.2–1.2)
BUN SERPL-MCNC: 43 MG/DL — HIGH (ref 7–23)
CALCIUM SERPL-MCNC: 8.9 MG/DL — SIGNIFICANT CHANGE UP (ref 8.4–10.5)
CHLORIDE SERPL-SCNC: 97 MMOL/L — LOW (ref 98–107)
CO2 SERPL-SCNC: 20 MMOL/L — LOW (ref 22–31)
CREAT SERPL-MCNC: 1.15 MG/DL — SIGNIFICANT CHANGE UP (ref 0.5–1.3)
EGFR: 49 ML/MIN/1.73M2 — LOW
EOSINOPHIL # BLD AUTO: 0 K/UL — SIGNIFICANT CHANGE UP (ref 0–0.5)
EOSINOPHIL NFR BLD AUTO: 0 % — SIGNIFICANT CHANGE UP (ref 0–6)
GLUCOSE BLDC GLUCOMTR-MCNC: 211 MG/DL — HIGH (ref 70–99)
GLUCOSE BLDC GLUCOMTR-MCNC: 216 MG/DL — HIGH (ref 70–99)
GLUCOSE SERPL-MCNC: 227 MG/DL — HIGH (ref 70–99)
HCT VFR BLD CALC: 28.5 % — LOW (ref 34.5–45)
HGB BLD-MCNC: 9.9 G/DL — LOW (ref 11.5–15.5)
HYPOCHROMIA BLD QL: SLIGHT — SIGNIFICANT CHANGE UP
IANC: 3.24 K/UL — SIGNIFICANT CHANGE UP (ref 1.8–7.4)
LYMPHOCYTES # BLD AUTO: 0.61 K/UL — LOW (ref 1–3.3)
LYMPHOCYTES # BLD AUTO: 14 % — SIGNIFICANT CHANGE UP (ref 13–44)
MAGNESIUM SERPL-MCNC: 2.1 MG/DL — SIGNIFICANT CHANGE UP (ref 1.6–2.6)
MANUAL SMEAR VERIFICATION: SIGNIFICANT CHANGE UP
MCHC RBC-ENTMCNC: 33.9 PG — SIGNIFICANT CHANGE UP (ref 27–34)
MCHC RBC-ENTMCNC: 34.7 GM/DL — SIGNIFICANT CHANGE UP (ref 32–36)
MCV RBC AUTO: 97.6 FL — SIGNIFICANT CHANGE UP (ref 80–100)
MICROCYTES BLD QL: SLIGHT — SIGNIFICANT CHANGE UP
MONOCYTES # BLD AUTO: 0.13 K/UL — SIGNIFICANT CHANGE UP (ref 0–0.9)
MONOCYTES NFR BLD AUTO: 3 % — SIGNIFICANT CHANGE UP (ref 2–14)
MRSA PCR RESULT.: DETECTED
NEUTROPHILS # BLD AUTO: 3.64 K/UL — SIGNIFICANT CHANGE UP (ref 1.8–7.4)
NEUTROPHILS NFR BLD AUTO: 83 % — HIGH (ref 43–77)
NRBC # BLD: 0 /100 WBCS — SIGNIFICANT CHANGE UP (ref 0–0)
PH FLD: 7.8 — SIGNIFICANT CHANGE UP
PHOSPHATE SERPL-MCNC: 3.1 MG/DL — SIGNIFICANT CHANGE UP (ref 2.5–4.5)
PLAT MORPH BLD: NORMAL — SIGNIFICANT CHANGE UP
PLATELET # BLD AUTO: 80 K/UL — LOW (ref 150–400)
PLATELET COUNT - ESTIMATE: ABNORMAL
POIKILOCYTOSIS BLD QL AUTO: SIGNIFICANT CHANGE UP
POLYCHROMASIA BLD QL SMEAR: SLIGHT — SIGNIFICANT CHANGE UP
POTASSIUM SERPL-MCNC: 2.4 MMOL/L — CRITICAL LOW (ref 3.5–5.3)
POTASSIUM SERPL-SCNC: 2.4 MMOL/L — CRITICAL LOW (ref 3.5–5.3)
PROT SERPL-MCNC: 5 G/DL — LOW (ref 6–8.3)
RBC # BLD: 2.92 M/UL — LOW (ref 3.8–5.2)
RBC # FLD: 19.8 % — HIGH (ref 10.3–14.5)
RBC BLD AUTO: ABNORMAL
S AUREUS DNA NOSE QL NAA+PROBE: DETECTED
SCHISTOCYTES BLD QL AUTO: SIGNIFICANT CHANGE UP
SODIUM SERPL-SCNC: 137 MMOL/L — SIGNIFICANT CHANGE UP (ref 135–145)
WBC # BLD: 4.38 K/UL — SIGNIFICANT CHANGE UP (ref 3.8–10.5)
WBC # FLD AUTO: 4.38 K/UL — SIGNIFICANT CHANGE UP (ref 3.8–10.5)

## 2024-04-13 PROCEDURE — 93010 ELECTROCARDIOGRAM REPORT: CPT

## 2024-04-13 PROCEDURE — 99232 SBSQ HOSP IP/OBS MODERATE 35: CPT | Mod: GC

## 2024-04-13 RX ORDER — LANOLIN ALCOHOL/MO/W.PET/CERES
6 CREAM (GRAM) TOPICAL AT BEDTIME
Refills: 0 | Status: DISCONTINUED | OUTPATIENT
Start: 2024-04-13 | End: 2024-04-15

## 2024-04-13 RX ORDER — POTASSIUM CHLORIDE 20 MEQ
10 PACKET (EA) ORAL
Refills: 0 | Status: COMPLETED | OUTPATIENT
Start: 2024-04-13 | End: 2024-04-13

## 2024-04-13 RX ORDER — OXYCODONE HYDROCHLORIDE 5 MG/1
2.5 TABLET ORAL ONCE
Refills: 0 | Status: DISCONTINUED | OUTPATIENT
Start: 2024-04-13 | End: 2024-04-14

## 2024-04-13 RX ORDER — POTASSIUM CHLORIDE 20 MEQ
40 PACKET (EA) ORAL EVERY 4 HOURS
Refills: 0 | Status: COMPLETED | OUTPATIENT
Start: 2024-04-13 | End: 2024-04-13

## 2024-04-13 RX ADMIN — PANTOPRAZOLE SODIUM 40 MILLIGRAM(S): 20 TABLET, DELAYED RELEASE ORAL at 06:11

## 2024-04-13 RX ADMIN — HEPARIN SODIUM 5000 UNIT(S): 5000 INJECTION INTRAVENOUS; SUBCUTANEOUS at 20:13

## 2024-04-13 RX ADMIN — LACTULOSE 10 GRAM(S): 10 SOLUTION ORAL at 14:23

## 2024-04-13 RX ADMIN — Medication 1 APPLICATION(S): at 05:33

## 2024-04-13 RX ADMIN — OCTREOTIDE ACETATE 10 MICROGRAM(S)/HR: 200 INJECTION, SOLUTION INTRAVENOUS; SUBCUTANEOUS at 06:09

## 2024-04-13 RX ADMIN — Medication 40 MILLIEQUIVALENT(S): at 14:23

## 2024-04-13 RX ADMIN — Medication 100 MILLIEQUIVALENT(S): at 11:08

## 2024-04-13 RX ADMIN — LACTULOSE 10 GRAM(S): 10 SOLUTION ORAL at 05:37

## 2024-04-13 RX ADMIN — Medication 5 MILLIGRAM(S): at 14:23

## 2024-04-13 RX ADMIN — HEPARIN SODIUM 5000 UNIT(S): 5000 INJECTION INTRAVENOUS; SUBCUTANEOUS at 05:37

## 2024-04-13 RX ADMIN — HEPARIN SODIUM 5000 UNIT(S): 5000 INJECTION INTRAVENOUS; SUBCUTANEOUS at 14:23

## 2024-04-13 RX ADMIN — Medication 100 MILLILITER(S): at 14:23

## 2024-04-13 RX ADMIN — OCTREOTIDE ACETATE 10 MICROGRAM(S)/HR: 200 INJECTION, SOLUTION INTRAVENOUS; SUBCUTANEOUS at 17:41

## 2024-04-13 RX ADMIN — LACTULOSE 10 GRAM(S): 10 SOLUTION ORAL at 20:13

## 2024-04-13 RX ADMIN — Medication 100 MILLILITER(S): at 20:13

## 2024-04-13 RX ADMIN — Medication 1 APPLICATION(S): at 14:23

## 2024-04-13 RX ADMIN — CHLORHEXIDINE GLUCONATE 1 APPLICATION(S): 213 SOLUTION TOPICAL at 15:16

## 2024-04-13 RX ADMIN — Medication 100 MILLIEQUIVALENT(S): at 08:44

## 2024-04-13 RX ADMIN — Medication 40 MILLIEQUIVALENT(S): at 10:14

## 2024-04-13 RX ADMIN — Medication 100 MILLILITER(S): at 05:35

## 2024-04-13 RX ADMIN — Medication 6 MILLIGRAM(S): at 20:13

## 2024-04-13 RX ADMIN — Medication 100 MILLIEQUIVALENT(S): at 10:09

## 2024-04-13 RX ADMIN — CEFTRIAXONE 100 MILLIGRAM(S): 500 INJECTION, POWDER, FOR SOLUTION INTRAMUSCULAR; INTRAVENOUS at 13:05

## 2024-04-13 RX ADMIN — Medication 650 MILLIGRAM(S): at 14:23

## 2024-04-13 RX ADMIN — Medication 1 APPLICATION(S): at 17:41

## 2024-04-13 NOTE — PROVIDER CONTACT NOTE (OTHER) - REASON
Pt family stating she is delirious this morning. Pt cannot follow commands nor answer questions regarding who she is. Baseline Ao4

## 2024-04-13 NOTE — PROGRESS NOTE ADULT - PROBLEM SELECTOR PLAN 2
Improving   - Hyponatremic to 125 w/ Urine sodium <20; likely iso very poor PO intake   - Red < 20, Uosm >330; Patient appears floridly overloaded; likely iso Cirrhosis      Plan:  restrict fluids  Low sodium diet due to cirrhosis  cmp Q12 - Dx in 3/2024, in OSH, no Bx performed, no Tx offered, sent home on Hospice   -  ALT 97 Tbili 34.9 on admission; Mixed elevated Direct (>20) and Indirect bili (13.8)  Family would like second opinion on treatment options by Oncology team   - Obtained [facility] records from [facility]   - Significant elevated >200 AFP on outpatient bloodwork;  this admission     Plan:   Consulted [facility] Oncology for 2nd opinion; not candidate for curative tx at this time  Consulted Hepatology  Palliative Consulted    Ammonia elevated to 100s but mentating well possibly falsely elevated; bm 4x/day    - Trend AST/ALT   - Lactulose 10 BID titrate to 3-4 BM  - Octreotide 50 and CTX (4/11-) 5-day for SBP prophylaxis   - c/w maintenance albumin  - Hold home Spironolactone and Lasix iso hypotension  - Dx para pending

## 2024-04-13 NOTE — PROGRESS NOTE ADULT - PROBLEM SELECTOR PLAN 4
hypoglycemic to 50s on admission likely iso sulfonylurea use   Continues to have hypoglycemic episodes to 50s;   Endocrine consulted  -Low TSH normal T4, normal cortisol     - D50 PRN for severe hypoglycemia   - Diet started + glucerna 2x/day  - CTM finger sticks w/ meals  - sulfonylurea levels  - Nutrition consult Improving  - Hypotensive to MAP 50s on admission s/p 3L Bolus and Albumin   - HR persistently low in 50s, not feasible to start midodrine     Plan:   - Albumin for hypotension and decreased renal perfusion, limit fluid boluses iso decompensated cirrhosis, Avoid midodrine 2/2 bradycardia  - Hold Lasix and Spironolactone  - Low threshold for micu consult

## 2024-04-13 NOTE — PROGRESS NOTE ADULT - PROBLEM SELECTOR PLAN 1
- Dx in 3/2024, in OSH, no Bx performed, no Tx offered, sent home on Hospice   -  ALT 97 Tbili 34.9 on admission; Mixed elevated Direct (>20) and Indirect bili (13.8)  Family would like second opinion on treatment options by Oncology team   - Obtained Imaging records from [facility]   - Significant elevated >200 AFP on outpatient bloodwork;  this admission     Plan:   Consulted [facility] Oncology for 2nd opinion; not candidate for curative tx at this time  Consulted Hepatology  Palliative Consulted    Ammonia elevated to 100s but mentating well possibly falsely elevated; bm 4x/day    - Trend AST/ALT   - Lactulose 10 BID titrate to 3-4 BM  - Octreotide 50 and CTX (4/11-) 5-day for SBP prophylaxis   - c/w maintenance albumin  - Hold home Spironolactone and Lasix iso hypotension  - Dx para pending Acutely altered on 4/13 in the morning. Unable to follow commands and limited neuro exam without abnormality. Likely hospital acquired delirium vs. hepatic encephalopathy    -will discuss with family role of antipsychotics if patient's mental status does not improve. Will ensure stool count is obtained and patient has adequate BMs. Lactulose to be titrated if she is not having at least 3 BMs/day. Will give 1 day of rifaximin as well Acutely altered on 4/13 in the morning. Unable to follow commands and limited neuro exam without abnormality (able to stand). Likely hospital acquired delirium vs. hepatic encephalopathy    -will discuss with family role of antipsychotics if patient's mental status does not improve. Will ensure stool count is obtained and patient has adequate BMs. Lactulose to be titrated if she is not having at least 3 BMs/day. Will give 1 day of rifaximin as well

## 2024-04-13 NOTE — PROGRESS NOTE ADULT - SUBJECTIVE AND OBJECTIVE BOX
Behzad Potts MD  PGY1  Preferred contact via [employer] Teams      Patient is a 74y old  Female who presents with a chief complaint of Weakness and SOB (12 Apr 2024 14:42)      SUBJECTIVE / OVERNIGHT EVENTS:    MEDICATIONS  (STANDING):  albumin human 25% IVPB 100 milliLiter(s) IV Intermittent every 8 hours  cefTRIAXone   IVPB 1000 milliGRAM(s) IV Intermittent every 24 hours  chlorhexidine 2% Cloths 1 Application(s) Topical daily  heparin   Injectable 5000 Unit(s) SubCutaneous every 8 hours  influenza  Vaccine (HIGH DOSE) 0.7 milliLiter(s) IntraMuscular once  lactulose Syrup 10 Gram(s) Oral three times a day  octreotide  Infusion 50 MICROgram(s)/Hr (10 mL/Hr) IV Continuous <Continuous>  oxyCODONE    IR 2.5 milliGRAM(s) Oral once  pantoprazole    Tablet 40 milliGRAM(s) Oral before breakfast  petrolatum white Ointment 1 Application(s) Topical every 6 hours  phytonadione   Solution 5 milliGRAM(s) Oral daily  potassium chloride   Solution 40 milliEquivalent(s) Oral every 4 hours  potassium chloride  10 mEq/100 mL IVPB 10 milliEquivalent(s) IV Intermittent every 1 hour  sodium bicarbonate 650 milliGRAM(s) Oral daily    MEDICATIONS  (PRN):  lidocaine   4% Patch 1 Patch Transdermal daily PRN back Pain  simethicone 80 milliGRAM(s) Chew three times a day PRN Gas      CAPILLARY BLOOD GLUCOSE      POCT Blood Glucose.: 262 mg/dL (12 Apr 2024 20:24)  POCT Blood Glucose.: 211 mg/dL (12 Apr 2024 18:48)  POCT Blood Glucose.: 169 mg/dL (12 Apr 2024 12:37)  POCT Blood Glucose.: 162 mg/dL (12 Apr 2024 08:23)    I&O's Summary    12 Apr 2024 07:01  -  13 Apr 2024 07:00  --------------------------------------------------------  IN: 570 mL / OUT: 200 mL / NET: 370 mL        Vital Signs Last 24 Hrs  T(C): 36.4 (13 Apr 2024 04:15), Max: 36.6 (12 Apr 2024 12:00)  T(F): 97.5 (13 Apr 2024 04:15), Max: 97.9 (12 Apr 2024 12:00)  HR: 68 (13 Apr 2024 04:15) (68 - 78)  BP: 136/56 (13 Apr 2024 04:15) (131/63 - 143/64)  BP(mean): 74 (13 Apr 2024 04:15) (74 - 81)  RR: 18 (13 Apr 2024 04:15) (17 - 18)  SpO2: 98% (13 Apr 2024 04:15) (98% - 100%)    Parameters below as of 13 Apr 2024 04:15  Patient On (Oxygen Delivery Method): room air        PHYSICAL EXAM:      LABS:                        9.9    4.38  )-----------( 80       ( 13 Apr 2024 06:00 )             28.5      04-13    137  |  97<L>  |  43<H>  ----------------------------<  227<H>  2.4<LL>   |  20<L>  |  1.15    Ca    8.9      13 Apr 2024 06:00  Phos  3.1     04-13  Mg     2.10     04-13    TPro  5.0<L>  /  Alb  4.0  /  TBili  38.3<H>  /  DBili  x   /  AST  84<H>  /  ALT  48<H>  /  AlkPhos  256<H>  04-13    PT/INR - ( 12 Apr 2024 06:00 )   PT: 21.3 sec;   INR: 1.92 ratio         PTT - ( 12 Apr 2024 06:00 )  PTT:68.4 sec      Urinalysis Basic - ( 13 Apr 2024 06:00 )    Color: x / Appearance: x / SG: x / pH: x  Gluc: 227 mg/dL / Ketone: x  / Bili: x / Urobili: x   Blood: x / Protein: x / Nitrite: x   Leuk Esterase: x / RBC: x / WBC x   Sq Epi: x / Non Sq Epi: x / Bacteria: x        RADIOLOGY & ADDITIONAL TESTS:    Imaging Personally Reviewed:    Consultant(s) Notes Reviewed:      Care Discussed with Consultants/Other Providers:   Behzad Potts MD  PGY3  Preferred contact via Microsoft Teams      Patient is a 74y old  Female who presents with a chief complaint of Weakness and SOB (12 Apr 2024 14:42)      SUBJECTIVE / OVERNIGHT EVENTS: This morning patient became acutely confused and altered. Minimally following commands and incoherent. AOx4 at baseline.     MEDICATIONS  (STANDING):  albumin human 25% IVPB 100 milliLiter(s) IV Intermittent every 8 hours  cefTRIAXone   IVPB 1000 milliGRAM(s) IV Intermittent every 24 hours  chlorhexidine 2% Cloths 1 Application(s) Topical daily  heparin   Injectable 5000 Unit(s) SubCutaneous every 8 hours  influenza  Vaccine (HIGH DOSE) 0.7 milliLiter(s) IntraMuscular once  lactulose Syrup 10 Gram(s) Oral three times a day  octreotide  Infusion 50 MICROgram(s)/Hr (10 mL/Hr) IV Continuous <Continuous>  oxyCODONE    IR 2.5 milliGRAM(s) Oral once  pantoprazole    Tablet 40 milliGRAM(s) Oral before breakfast  petrolatum white Ointment 1 Application(s) Topical every 6 hours  phytonadione   Solution 5 milliGRAM(s) Oral daily  potassium chloride   Solution 40 milliEquivalent(s) Oral every 4 hours  potassium chloride  10 mEq/100 mL IVPB 10 milliEquivalent(s) IV Intermittent every 1 hour  sodium bicarbonate 650 milliGRAM(s) Oral daily    MEDICATIONS  (PRN):  lidocaine   4% Patch 1 Patch Transdermal daily PRN back Pain  simethicone 80 milliGRAM(s) Chew three times a day PRN Gas      CAPILLARY BLOOD GLUCOSE      POCT Blood Glucose.: 262 mg/dL (12 Apr 2024 20:24)  POCT Blood Glucose.: 211 mg/dL (12 Apr 2024 18:48)  POCT Blood Glucose.: 169 mg/dL (12 Apr 2024 12:37)  POCT Blood Glucose.: 162 mg/dL (12 Apr 2024 08:23)    I&O's Summary    12 Apr 2024 07:01  -  13 Apr 2024 07:00  --------------------------------------------------------  IN: 570 mL / OUT: 200 mL / NET: 370 mL        Vital Signs Last 24 Hrs  T(C): 36.4 (13 Apr 2024 09:00), Max: 36.6 (12 Apr 2024 12:00)  T(F): 97.5 (13 Apr 2024 09:00), Max: 97.9 (12 Apr 2024 12:00)  HR: 68 (13 Apr 2024 09:00) (68 - 78)  BP: 147/65 (13 Apr 2024 09:00) (131/63 - 147/65)  BP(mean): 74 (13 Apr 2024 04:15) (74 - 81)  RR: 18 (13 Apr 2024 09:00) (17 - 18)  SpO2: 100% (13 Apr 2024 09:00) (98% - 100%)    Parameters below as of 13 Apr 2024 09:00  Patient On (Oxygen Delivery Method): room air        PHYSICAL EXAM:  GENERAL: NAD, confused  HEAD:  Atraumatic, Normocephalic  EYES:conjunctiva and sclera clear  ENMT: No tonsillar erythema, exudates, or enlargement; Moist mucous membranes, Good dentition  NERVOUS SYSTEM: unable to assess, moving all extremities  PSYCHIATRIC: confused and agitated  CHEST/LUNG: Clear to auscultation bilaterally; No rales, rhonchi, wheezing, or rubs  HEART: Regular rate and rhythm; No murmurs, rubs, or gallops. No LE edema  ABDOMEN: Soft, Nontender, Nondistended; Bowel sounds present  EXTREMITIES:  2+ Peripheral Pulses, No clubbing, cyanosis  SKIN: No rashes or lesions      LABS:                        9.9    4.38  )-----------( 80       ( 13 Apr 2024 06:00 )             28.5      04-13    137  |  97<L>  |  43<H>  ----------------------------<  227<H>  2.4<LL>   |  20<L>  |  1.15    Ca    8.9      13 Apr 2024 06:00  Phos  3.1     04-13  Mg     2.10     04-13    TPro  5.0<L>  /  Alb  4.0  /  TBili  38.3<H>  /  DBili  x   /  AST  84<H>  /  ALT  48<H>  /  AlkPhos  256<H>  04-13    PT/INR - ( 12 Apr 2024 06:00 )   PT: 21.3 sec;   INR: 1.92 ratio         PTT - ( 12 Apr 2024 06:00 )  PTT:68.4 sec      Urinalysis Basic - ( 13 Apr 2024 06:00 )    Color: x / Appearance: x / SG: x / pH: x  Gluc: 227 mg/dL / Ketone: x  / Bili: x / Urobili: x   Blood: x / Protein: x / Nitrite: x   Leuk Esterase: x / RBC: x / WBC x   Sq Epi: x / Non Sq Epi: x / Bacteria: x        RADIOLOGY & ADDITIONAL TESTS:    Imaging Personally Reviewed:    Consultant(s) Notes Reviewed:      Care Discussed with Consultants/Other Providers:   Behzad Potts MD  PGY3  Preferred contact via [employer] Teams    Patient is a 74y old  Female who presents with a chief complaint of Weakness and SOB (12 Apr 2024 14:42)    SUBJECTIVE / OVERNIGHT EVENTS: This morning patient became acutely confused and altered. Minimally following commands and incoherent. AOx4 at baseline.     MEDICATIONS  (STANDING):  albumin human 25% IVPB 100 milliLiter(s) IV Intermittent every 8 hours  cefTRIAXone   IVPB 1000 milliGRAM(s) IV Intermittent every 24 hours  chlorhexidine 2% Cloths 1 Application(s) Topical daily  heparin   Injectable 5000 Unit(s) SubCutaneous every 8 hours  influenza  Vaccine (HIGH DOSE) 0.7 milliLiter(s) IntraMuscular once  lactulose Syrup 10 Gram(s) Oral three times a day  octreotide  Infusion 50 MICROgram(s)/Hr (10 mL/Hr) IV Continuous <Continuous>  oxyCODONE    IR 2.5 milliGRAM(s) Oral once  pantoprazole    Tablet 40 milliGRAM(s) Oral before breakfast  petrolatum white Ointment 1 Application(s) Topical every 6 hours  phytonadione   Solution 5 milliGRAM(s) Oral daily  potassium chloride   Solution 40 milliEquivalent(s) Oral every 4 hours  potassium chloride  10 mEq/100 mL IVPB 10 milliEquivalent(s) IV Intermittent every 1 hour  sodium bicarbonate 650 milliGRAM(s) Oral daily    MEDICATIONS  (PRN):  lidocaine   4% Patch 1 Patch Transdermal daily PRN back Pain  simethicone 80 milliGRAM(s) Chew three times a day PRN Gas      CAPILLARY BLOOD GLUCOSE  POCT Blood Glucose.: 262 mg/dL (12 Apr 2024 20:24)  POCT Blood Glucose.: 211 mg/dL (12 Apr 2024 18:48)  POCT Blood Glucose.: 169 mg/dL (12 Apr 2024 12:37)  POCT Blood Glucose.: 162 mg/dL (12 Apr 2024 08:23)    I&O's Summary    12 Apr 2024 07:01  -  13 Apr 2024 07:00  --------------------------------------------------------  IN: 570 mL / OUT: 200 mL / NET: 370 mL    Vital Signs Last 24 Hrs  T(C): 36.4 (13 Apr 2024 09:00), Max: 36.6 (12 Apr 2024 12:00)  T(F): 97.5 (13 Apr 2024 09:00), Max: 97.9 (12 Apr 2024 12:00)  HR: 68 (13 Apr 2024 09:00) (68 - 78)  BP: 147/65 (13 Apr 2024 09:00) (131/63 - 147/65)  BP(mean): 74 (13 Apr 2024 04:15) (74 - 81)  RR: 18 (13 Apr 2024 09:00) (17 - 18)  SpO2: 100% (13 Apr 2024 09:00) (98% - 100%)    Parameters below as of 13 Apr 2024 09:00  Patient On (Oxygen Delivery Method): room air    PHYSICAL EXAM:  GENERAL: NAD, confused  HEAD:  Atraumatic, Normocephalic  EYES:conjunctiva and sclera clear  ENMT: No tonsillar erythema, exudates, or enlargement; Moist mucous membranes, Good dentition  NERVOUS SYSTEM: unable to assess, moving all extremities  PSYCHIATRIC: confused and agitated  CHEST/LUNG: Clear to auscultation bilaterally; No rales, rhonchi, wheezing, or rubs  HEART: Regular rate and rhythm; No murmurs, rubs, or gallops. No LE edema  ABDOMEN: Soft, Nontender, Nondistended; Bowel sounds present  EXTREMITIES:  2+ Peripheral Pulses, No clubbing, cyanosis  SKIN: No rashes or lesions      LABS:                        9.9    4.38  )-----------( 80       ( 13 Apr 2024 06:00 )             28.5      04-13    137  |  97<L>  |  43<H>  ----------------------------<  227<H>  2.4<LL>   |  20<L>  |  1.15    Ca    8.9      13 Apr 2024 06:00  Phos  3.1     04-13  Mg     2.10     04-13    TPro  5.0<L>  /  Alb  4.0  /  TBili  38.3<H>  /  DBili  x   /  AST  84<H>  /  ALT  48<H>  /  AlkPhos  256<H>  04-13    PT/INR - ( 12 Apr 2024 06:00 )   PT: 21.3 sec;   INR: 1.92 ratio    PTT - ( 12 Apr 2024 06:00 )  PTT:68.4 sec    Urinalysis Basic - ( 13 Apr 2024 06:00 )  Color: x / Appearance: x / SG: x / pH: x  Gluc: 227 mg/dL / Ketone: x  / Bili: x / Urobili: x   Blood: x / Protein: x / Nitrite: x   Leuk Esterase: x / RBC: x / WBC x   Sq Epi: x / Non Sq Epi: x / Bacteria: x      Care Discussed with Consultants/Other Providers:

## 2024-04-13 NOTE — PROGRESS NOTE ADULT - PROBLEM SELECTOR PLAN 6
Hypoglycemic currently iso hepatic failure vs. Sulfonylurea use   discontinue home Glimepiride Improving  unknown baseline; possibly hepatorenal     -Avoid nephrotoxic agents   -c/w octreotide 50  -Sodium Bicarb for acidosis

## 2024-04-13 NOTE — PROGRESS NOTE ADULT - PROBLEM SELECTOR PLAN 7
DVT PPx: Heparin SubQ   Gi PPx: Pantoprazole  Diet: pescatarian diet 2x/d glucerna  PT/OT: Hold off until hemodynamically stable  Code Status: Full code   Dispo: Pending Hypoglycemic currently iso hepatic failure vs. Sulfonylurea use   discontinue home Glimepiride

## 2024-04-13 NOTE — PROGRESS NOTE ADULT - ASSESSMENT
74F w/ HCC 2/2 chronic Hep C s/p harvoni 2015, decomensated cirrhosis (ascites), CAD (stress test), T2DM, HTN, HLD, with recent admission to [facility] for worsening jaundice, found to have infiltrative HCC w/ extensive tumor thrombus initially discharged to home hospice presents to [facility] with new onset chest tightness as well as 1-2 weeks of worsening lethargy/failure to thrive.  74F HCC 2/2 chronic Hep C s/p harvoni 2015, decomensated cirrhosis (ascites), CAD (stress test), T2DM, HTN, HLD, with recent admission to [facility] for worsening jaundice, found to have infiltrative HCC w/ extensive tumor thrombus initially discharged to home hospice presents to [facility] with new onset chest tightness as well as 1-2 weeks of worsening lethargy/failure to thrive.

## 2024-04-13 NOTE — PROVIDER CONTACT NOTE (CHANGE IN STATUS NOTIFICATION) - SITUATION
Patient complained of chest tightness. Family also reports that patient hasn't slept much in the last two days and is frequently yawning which is concerning to them.

## 2024-04-13 NOTE — PROVIDER CONTACT NOTE (OTHER) - ASSESSMENT
Pt resting comfortably in bed. Pt resting comfortably in bed. Family educated on importance of vitals and monitoring FS using teach back method. Family verbalized understanding. Family still refused.

## 2024-04-13 NOTE — PROGRESS NOTE ADULT - PROBLEM SELECTOR PLAN 5
Improving  unknown baseline; possibly hepatorenal     -Avoid nephrotoxic agents   -c/w octreotide 50  -Sodium Bicarb for acidosis hypoglycemic to 50s on admission likely iso sulfonylurea use   Continues to have hypoglycemic episodes to 50s;   Endocrine consulted  -Low TSH normal T4, normal cortisol     - D50 PRN for severe hypoglycemia   - Diet started + glucerna 2x/day  - CTM finger sticks w/ meals  - sulfonylurea levels  - Nutrition consult

## 2024-04-13 NOTE — PROGRESS NOTE ADULT - ATTENDING COMMENTS
Patient seen and examined care d/w resident and family at bedside    74F HTN, DM2, Hep C cirrhosis c/b HCC recent OSH dc [facility] presenting to the ER with weakness, chest tightness and shortness of breath. No treatment was offered by oncology team at [facility] and was managed palliatively and discharged with home hospice. Family looking for further treatment, per oncology here not treatment candidate. CTAP Cirrhosis with portal hypertension. Large right hepaticlobe heterogeneous hypoattenuating lesion measuring  1.1 x 7.7 cm, consistent with history of hepatocellular carcinoma. Dilated main portal vein measuring up to 3.1 cm. Differentials include bland versus tumor thrombosis     # FTT/ advanced Hepatocellular Ca with hyperbilirubinemia w/o evidence of obstruction on Ct, hypoglycemia sec to Liver abn and poor po intake, hyponatremia, hypotension on admission most likely sec to hepatorenal syndrome with elevated creatinine.  - Cr better  - confusion, delirium (not sleeping, multiple hospital stays) vs PSE (poor liver fxn/cirrhosis/HCC), reorient, lactulose, rifaxmin, advised family not uncommon in advance malignancy   - s/p para, not c/w SBP, f/u GI need for ongoing ctx   - [facility] consult appreciated. no oncological treatment recommended, Palliative/ hospice eval  - Cr improving, per RN voiding substanitial; 1 BM on 4/12    [facility]  care d/w family at bedside   HCP Son Neptali Stewart [phone], #2 is daughter Andrews Stewart [phone]  full code, guarded prognosis   plan was for home hospice Monday

## 2024-04-14 LAB
GLUCOSE BLDC GLUCOMTR-MCNC: 180 MG/DL — HIGH (ref 70–99)
GLUCOSE BLDC GLUCOMTR-MCNC: 184 MG/DL — HIGH (ref 70–99)
GLUCOSE BLDC GLUCOMTR-MCNC: 184 MG/DL — HIGH (ref 70–99)
GLUCOSE BLDC GLUCOMTR-MCNC: 243 MG/DL — HIGH (ref 70–99)

## 2024-04-14 PROCEDURE — 99232 SBSQ HOSP IP/OBS MODERATE 35: CPT | Mod: GC

## 2024-04-14 RX ORDER — DEXTROSE 50 % IN WATER 50 %
12.5 SYRINGE (ML) INTRAVENOUS ONCE
Refills: 0 | Status: DISCONTINUED | OUTPATIENT
Start: 2024-04-14 | End: 2024-04-15

## 2024-04-14 RX ORDER — DEXTROSE 50 % IN WATER 50 %
25 SYRINGE (ML) INTRAVENOUS ONCE
Refills: 0 | Status: DISCONTINUED | OUTPATIENT
Start: 2024-04-14 | End: 2024-04-15

## 2024-04-14 RX ORDER — DEXTROSE 50 % IN WATER 50 %
15 SYRINGE (ML) INTRAVENOUS ONCE
Refills: 0 | Status: DISCONTINUED | OUTPATIENT
Start: 2024-04-14 | End: 2024-04-15

## 2024-04-14 RX ORDER — HYDROMORPHONE HYDROCHLORIDE 2 MG/ML
0.5 INJECTION INTRAMUSCULAR; INTRAVENOUS; SUBCUTANEOUS ONCE
Refills: 0 | Status: DISCONTINUED | OUTPATIENT
Start: 2024-04-14 | End: 2024-04-14

## 2024-04-14 RX ORDER — OXYCODONE HYDROCHLORIDE 5 MG/1
5 TABLET ORAL EVERY 4 HOURS
Refills: 0 | Status: DISCONTINUED | OUTPATIENT
Start: 2024-04-14 | End: 2024-04-15

## 2024-04-14 RX ORDER — SODIUM CHLORIDE 9 MG/ML
1000 INJECTION, SOLUTION INTRAVENOUS
Refills: 0 | Status: DISCONTINUED | OUTPATIENT
Start: 2024-04-14 | End: 2024-04-15

## 2024-04-14 RX ORDER — MUPIROCIN 20 MG/G
1 OINTMENT TOPICAL
Refills: 0 | Status: DISCONTINUED | OUTPATIENT
Start: 2024-04-14 | End: 2024-04-14

## 2024-04-14 RX ORDER — GLUCAGON INJECTION, SOLUTION 0.5 MG/.1ML
1 INJECTION, SOLUTION SUBCUTANEOUS ONCE
Refills: 0 | Status: DISCONTINUED | OUTPATIENT
Start: 2024-04-14 | End: 2024-04-15

## 2024-04-14 RX ORDER — HALOPERIDOL DECANOATE 100 MG/ML
0.5 INJECTION INTRAMUSCULAR
Refills: 0 | Status: DISCONTINUED | OUTPATIENT
Start: 2024-04-14 | End: 2024-04-15

## 2024-04-14 RX ORDER — INSULIN LISPRO 100/ML
VIAL (ML) SUBCUTANEOUS AT BEDTIME
Refills: 0 | Status: DISCONTINUED | OUTPATIENT
Start: 2024-04-14 | End: 2024-04-15

## 2024-04-14 RX ORDER — LACTULOSE 10 G/15ML
15 SOLUTION ORAL THREE TIMES A DAY
Refills: 0 | Status: DISCONTINUED | OUTPATIENT
Start: 2024-04-14 | End: 2024-04-15

## 2024-04-14 RX ORDER — DEXTROSE 10 % IN WATER 10 %
125 INTRAVENOUS SOLUTION INTRAVENOUS ONCE
Refills: 0 | Status: DISCONTINUED | OUTPATIENT
Start: 2024-04-14 | End: 2024-04-15

## 2024-04-14 RX ORDER — INSULIN LISPRO 100/ML
VIAL (ML) SUBCUTANEOUS
Refills: 0 | Status: DISCONTINUED | OUTPATIENT
Start: 2024-04-14 | End: 2024-04-15

## 2024-04-14 RX ADMIN — HYDROMORPHONE HYDROCHLORIDE 0.5 MILLIGRAM(S): 2 INJECTION INTRAMUSCULAR; INTRAVENOUS; SUBCUTANEOUS at 09:30

## 2024-04-14 RX ADMIN — Medication 1 APPLICATION(S): at 05:17

## 2024-04-14 RX ADMIN — HALOPERIDOL DECANOATE 0.5 MILLIGRAM(S): 100 INJECTION INTRAMUSCULAR at 19:51

## 2024-04-14 RX ADMIN — Medication 1 APPLICATION(S): at 18:24

## 2024-04-14 RX ADMIN — OCTREOTIDE ACETATE 10 MICROGRAM(S)/HR: 200 INJECTION, SOLUTION INTRAVENOUS; SUBCUTANEOUS at 05:17

## 2024-04-14 RX ADMIN — CEFTRIAXONE 100 MILLIGRAM(S): 500 INJECTION, POWDER, FOR SOLUTION INTRAMUSCULAR; INTRAVENOUS at 13:22

## 2024-04-14 RX ADMIN — Medication 1 APPLICATION(S): at 13:21

## 2024-04-14 RX ADMIN — Medication 5 MILLIGRAM(S): at 13:21

## 2024-04-14 RX ADMIN — Medication 100 MILLILITER(S): at 05:17

## 2024-04-14 RX ADMIN — LACTULOSE 10 GRAM(S): 10 SOLUTION ORAL at 05:18

## 2024-04-14 RX ADMIN — PANTOPRAZOLE SODIUM 40 MILLIGRAM(S): 20 TABLET, DELAYED RELEASE ORAL at 05:18

## 2024-04-14 RX ADMIN — OCTREOTIDE ACETATE 10 MICROGRAM(S)/HR: 200 INJECTION, SOLUTION INTRAVENOUS; SUBCUTANEOUS at 13:16

## 2024-04-14 RX ADMIN — CHLORHEXIDINE GLUCONATE 1 APPLICATION(S): 213 SOLUTION TOPICAL at 13:25

## 2024-04-14 RX ADMIN — LACTULOSE 15 GRAM(S): 10 SOLUTION ORAL at 13:20

## 2024-04-14 RX ADMIN — HEPARIN SODIUM 5000 UNIT(S): 5000 INJECTION INTRAVENOUS; SUBCUTANEOUS at 05:18

## 2024-04-14 RX ADMIN — HYDROMORPHONE HYDROCHLORIDE 0.5 MILLIGRAM(S): 2 INJECTION INTRAMUSCULAR; INTRAVENOUS; SUBCUTANEOUS at 09:15

## 2024-04-14 RX ADMIN — LACTULOSE 15 GRAM(S): 10 SOLUTION ORAL at 21:48

## 2024-04-14 RX ADMIN — Medication 6 MILLIGRAM(S): at 21:48

## 2024-04-14 NOTE — PROVIDER CONTACT NOTE (OTHER) - ASSESSMENT
VSS. No s/s of distress. Pt and family educated on importance of labs using teach back method. Family verbalized understanding, still choosing to refuse.

## 2024-04-14 NOTE — PROVIDER CONTACT NOTE (OTHER) - BACKGROUND
74F PMH HCC and hepatitis. Admitted for lethargy and failure to thrive.
74F PMH HCC and hepatitis. Admitted for lethargy and failure to thrive
74F PMH HCC and hepatitis. Admitted for lethargy and failure to thrive.

## 2024-04-14 NOTE — PROGRESS NOTE ADULT - PROBLEM SELECTOR PLAN 1
Acutely altered on 4/13 in the morning. Unable to follow commands and limited neuro exam without abnormality (able to stand). Likely hospital acquired delirium vs. hepatic encephalopathy    -will discuss with family role of antipsychotics if patient's mental status does not improve. Will ensure stool count is obtained and patient has adequate BMs. Lactulose to be titrated if she is not having at least 3 BMs/day. Will give 1 day of rifaximin as well Acutely altered on 4/13 in the morning. Unable to follow commands and limited neuro exam without abnormality (able to stand).   - Continues to have AMS 4/14 AM; per family having adequate BM >3 per day and confusion worsens at night.   - Patient has not slept since admission; melatonin not working per family       - C/w lactulose; s/p Rifaximin; Stool counts   - Stat dose Dilaudid IV 0.5 given 4/14 AM; add on Oxycodone 2.5 PO standing given patient likely awake due to pain. Acutely altered on 4/13 in the morning. Unable to follow commands and limited neuro exam without abnormality (able to stand).   - Continues to have AMS 4/14 AM; confusion worsens at night.   - Patient has not slept since admission; melatonin not working per family       - Increase lactulose to 15g TID; s/p Rifaximin; Stool counts   - Stat dose Dilaudid IV 0.5 given 4/14 AM; add on Oxycodone 2.5 PO standing given patient likely awake due to pain.  - 0.5 Haldol QHS (QTc 441)

## 2024-04-14 NOTE — PROGRESS NOTE ADULT - ATTENDING COMMENTS
Patient seen and examined care d/w resident and family at bedside    74F HTN, DM2, Hep C cirrhosis c/b HCC recent OSH dc [facility] presenting to the ER with weakness, chest tightness and shortness of breath. No treatment was offered by oncology team at [facility] and was managed palliatively and discharged with home hospice. Family looking for further treatment, per oncology here not treatment candidate. CTAP Cirrhosis with portal hypertension. Large right hepaticlobe heterogeneous hypoattenuating lesion measuring  1.1 x 7.7 cm, consistent with history of hepatocellular carcinoma. Dilated main portal vein measuring up to 3.1 cm. Differentials include bland versus tumor thrombosis     # FTT/ advanced Hepatocellular Ca with hyperbilirubinemia w/o evidence of obstruction on Ct, hypoglycemia sec to Liver abn and poor po intake, hyponatremia, hypotension on admission most likely sec to hepatorenal syndrome with elevated creatinine.  - Cr better  - confusion, delirium (not sleeping, multiple hospital stays) vs PSE (poor liver fxn/cirrhosis/HCC), reorient, lactulose, rifaxmin, advised family not uncommon in advance malignancy   - s/p para, not c/w SBP, f/u GI need for ongoing ctx   - [facility] consult appreciated. no oncological treatment recommended, Palliative/ hospice eval  - Cr improving, per RN voiding substantially; 1 BM on 4/12 and 4/13, likely not enough, increase lactulose     [facility]  care d/w family at bedside--plan is still for possible dc tomorrow to hospice, states she does not want labs any longer for now    HCP Son Neptali Stewart [phone], #2 is daughter Andrews Stewart [phone]  full code, guarded prognosis Patient seen and examined care d/w resident and family at bedside    74F HTN, DM2, Hep C cirrhosis c/b HCC recent OSH dc sent on [facility] hospice presenting to the ER with weakness, chest tightness and shortness of breath. No treatment was offered by oncology team at [facility] and was managed palliatively and discharged with home hospice. Family looking for further treatment, per oncology here not treatment candidate. CTAP Cirrhosis with portal hypertension. Large right hepaticlobe heterogeneous hypoattenuating lesion measuring  1.1 x 7.7 cm, consistent with history of hepatocellular carcinoma. Dilated main portal vein measuring up to 3.1 cm. Differentials include bland versus tumor thrombosis     # FTT/ advanced Hepatocellular Ca with hyperbilirubinemia w/o evidence of obstruction on Ct, hypoglycemia sec to Liver abn and poor po intake, hyponatremia, hypotension on admission most likely sec to hepatorenal syndrome with elevated creatinine.  - Cr better  - confusion, delirium (not sleeping, multiple hospital stays) vs PSE (poor liver fxn/cirrhosis/HCC), reorient, lactulose, rifaxmin, advised family not uncommon in advance malignancy   - s/p para, not c/w SBP, f/u GI need for ongoing ctx   - [facility] consult appreciated. no oncological treatment recommended, Palliative/ hospice eval  - Cr improving, per RN voiding substantially; 1 BM on 4/12 and 4/13, likely not enough, increase lactulose   - dilaudid prn for pain, per daughter feels sciatica pain may be causing her discomfort  -  hadol low dose qhs 0.5 mg for delirium/agitation    [facility]  care d/w family at bedside--plan is still for possible dc tomorrow to hospice, states she does not want labs any longer for now    HCP Son Neptali Stewart [phone], #2 is daughter Andrews Stewart [phone]  full code, guarded prognosis

## 2024-04-14 NOTE — PROGRESS NOTE ADULT - PROBLEM SELECTOR PLAN 4
Improving  - Hypotensive to MAP 50s on admission s/p 3L Bolus and Albumin   - HR persistently low in 50s, not feasible to start midodrine     Plan:   - Albumin for hypotension and decreased renal perfusion, limit fluid boluses iso decompensated cirrhosis, Avoid midodrine 2/2 bradycardia  - Hold Lasix and Spironolactone  - Low threshold for micu consult

## 2024-04-14 NOTE — PROGRESS NOTE ADULT - ASSESSMENT
74F HCC 2/2 chronic Hep C s/p harvoni 2015, decomensated cirrhosis (ascites), CAD (stress test), T2DM, HTN, HLD, with recent admission to [facility] for worsening jaundice, found to have infiltrative HCC w/ extensive tumor thrombus initially discharged to home hospice presents to [facility] with new onset chest tightness as well as 1-2 weeks of worsening lethargy/failure to thrive.

## 2024-04-14 NOTE — PROGRESS NOTE ADULT - SUBJECTIVE AND OBJECTIVE BOX
Progress Note  Authored by:   Juan A Amos MD   PGY-1 Internal Medicine    04-10-24 (4d)    Patient is a 74y old  Female who presents with a chief complaint of Weakness and SOB (13 Apr 2024 07:58)      Subjective / Overnight Events :  - No acute events overnight.  - Pt seen and examined at bedside.     Additional ROS (if any):    MEDICATIONS  (STANDING):  cefTRIAXone   IVPB 1000 milliGRAM(s) IV Intermittent every 24 hours  chlorhexidine 2% Cloths 1 Application(s) Topical daily  heparin   Injectable 5000 Unit(s) SubCutaneous every 8 hours  influenza  Vaccine (HIGH DOSE) 0.7 milliLiter(s) IntraMuscular once  lactulose Syrup 10 Gram(s) Oral three times a day  melatonin 6 milliGRAM(s) Oral at bedtime  octreotide  Infusion 50 MICROgram(s)/Hr (10 mL/Hr) IV Continuous <Continuous>  oxyCODONE    IR 2.5 milliGRAM(s) Oral once  pantoprazole    Tablet 40 milliGRAM(s) Oral before breakfast  petrolatum white Ointment 1 Application(s) Topical every 6 hours  phytonadione   Solution 5 milliGRAM(s) Oral daily  sodium bicarbonate 650 milliGRAM(s) Oral daily    MEDICATIONS  (PRN):  lidocaine   4% Patch 1 Patch Transdermal daily PRN back Pain  simethicone 80 milliGRAM(s) Chew three times a day PRN Gas          PHYSICAL EXAM:  Vital Signs Last 24 Hrs  T(C): 36.4 (13 Apr 2024 20:53), Max: 36.4 (13 Apr 2024 09:00)  T(F): 97.6 (13 Apr 2024 20:53), Max: 97.6 (13 Apr 2024 20:53)  HR: 94 (13 Apr 2024 20:53) (68 - 94)  BP: 156/76 (13 Apr 2024 20:53) (147/65 - 156/76)  BP(mean): --  RR: 16 (13 Apr 2024 20:53) (16 - 18)  SpO2: 100% (13 Apr 2024 20:53) (100% - 100%)    Parameters below as of 13 Apr 2024 20:53  Patient On (Oxygen Delivery Method): room air        I&O's Summary    13 Apr 2024 07:01  -  14 Apr 2024 07:00  --------------------------------------------------------  IN: 600 mL / OUT: 600 mL / NET: 0 mL        General: NAD, non-toxic appearing   HEENT: EOMi, positive scleral icterus  CV: RRR, normal S1 and S2, no m/r/g  Lungs: normal respiratory effort. CTAB, no wheezes, rales, or rhonchi  Abd: soft, nontender, distended  Ext: 2+ edema, 2+ peripheral pulses   Pysch: AOx4, appropriate affect   Neuro: grossly non-focal, moving all extremities spontaneously   Skin: no rashes or lesions, Jaundice     LABS:  CAPILLARY BLOOD GLUCOSE      POCT Blood Glucose.: 211 mg/dL (13 Apr 2024 18:16)  POCT Blood Glucose.: 216 mg/dL (13 Apr 2024 08:52)                              9.9    4.38  )-----------( 80       ( 13 Apr 2024 06:00 )             28.5       WBC Trend: 4.38<--, 5.08<--, 5.61<--  Hb Trend: 9.9<--, 10.2<--, 10.4<--, 12.1<--, 12.5<--    04-13    137  |  97<L>  |  43<H>  ----------------------------<  227<H>  2.4<LL>   |  20<L>  |  1.15    Ca    8.9      13 Apr 2024 06:00  Phos  3.1     04-13  Mg     2.10     04-13    TPro  5.0<L>  /  Alb  4.0  /  TBili  38.3<H>  /  DBili  x   /  AST  84<H>  /  ALT  48<H>  /  AlkPhos  256<H>  04-13          Urinalysis Basic - ( 13 Apr 2024 06:00 )    Color: x / Appearance: x / SG: x / pH: x  Gluc: 227 mg/dL / Ketone: x  / Bili: x / Urobili: x   Blood: x / Protein: x / Nitrite: x   Leuk Esterase: x / RBC: x / WBC x   Sq Epi: x / Non Sq Epi: x / Bacteria: x        Culture - Fungal, Body Fluid (collected 12 Apr 2024 16:15)  Source: Ascites Fl  Preliminary Report (13 Apr 2024 23:04):    Culture is being performed. Fungal cultures are held for 4 weeks.    Culture - Body Fluid with Gram Stain (collected 12 Apr 2024 16:15)  Source: Ascites Fl  Gram Stain (12 Apr 2024 23:27):    No polymorphonuclear leukocytes per low power field    No organisms seen per oil power field  Preliminary Report (13 Apr 2024 18:38):    No growth          RADIOLOGY & ADDITIONAL TESTS: Reviewed     Progress Note  Authored by:   Juan A Amos MD   PGY-1 Internal Medicine    04-10-24 (4d)    Patient is a 74y old  Female who presents with a chief complaint of Weakness and SOB (13 Apr 2024 07:58)      Subjective / Overnight Events :  - No acute events overnight. AOx0 this AM, confused. Per family has been having 2-3 BM per day and has not slept much since admission. Also states patient may have sciatica pain   - Pt seen and examined at bedside.     Additional ROS (if any):    MEDICATIONS  (STANDING):  cefTRIAXone   IVPB 1000 milliGRAM(s) IV Intermittent every 24 hours  chlorhexidine 2% Cloths 1 Application(s) Topical daily  heparin   Injectable 5000 Unit(s) SubCutaneous every 8 hours  influenza  Vaccine (HIGH DOSE) 0.7 milliLiter(s) IntraMuscular once  lactulose Syrup 10 Gram(s) Oral three times a day  melatonin 6 milliGRAM(s) Oral at bedtime  octreotide  Infusion 50 MICROgram(s)/Hr (10 mL/Hr) IV Continuous <Continuous>  oxyCODONE    IR 2.5 milliGRAM(s) Oral once  pantoprazole    Tablet 40 milliGRAM(s) Oral before breakfast  petrolatum white Ointment 1 Application(s) Topical every 6 hours  phytonadione   Solution 5 milliGRAM(s) Oral daily  sodium bicarbonate 650 milliGRAM(s) Oral daily    MEDICATIONS  (PRN):  lidocaine   4% Patch 1 Patch Transdermal daily PRN back Pain  simethicone 80 milliGRAM(s) Chew three times a day PRN Gas          PHYSICAL EXAM:  Vital Signs Last 24 Hrs  T(C): 36.4 (13 Apr 2024 20:53), Max: 36.4 (13 Apr 2024 09:00)  T(F): 97.6 (13 Apr 2024 20:53), Max: 97.6 (13 Apr 2024 20:53)  HR: 94 (13 Apr 2024 20:53) (68 - 94)  BP: 156/76 (13 Apr 2024 20:53) (147/65 - 156/76)  BP(mean): --  RR: 16 (13 Apr 2024 20:53) (16 - 18)  SpO2: 100% (13 Apr 2024 20:53) (100% - 100%)    Parameters below as of 13 Apr 2024 20:53  Patient On (Oxygen Delivery Method): room air        I&O's Summary    13 Apr 2024 07:01  -  14 Apr 2024 07:00  --------------------------------------------------------  IN: 600 mL / OUT: 600 mL / NET: 0 mL        General: NAD, non-toxic appearing   HEENT: EOMi, positive scleral icterus  CV: RRR, normal S1 and S2, no m/r/g  Lungs: normal respiratory effort. CTAB, no wheezes, rales, or rhonchi  Abd: soft, nontender, distended  Ext: 2+ edema, 2+ peripheral pulses   Pysch: AOx0; confused this AM. Awake but unable to follow commands. Protecting airway    Neuro: grossly non-focal, moving all extremities spontaneously   Skin: no rashes or lesions, Jaundice     LABS:  CAPILLARY BLOOD GLUCOSE      POCT Blood Glucose.: 211 mg/dL (13 Apr 2024 18:16)  POCT Blood Glucose.: 216 mg/dL (13 Apr 2024 08:52)                              9.9    4.38  )-----------( 80       ( 13 Apr 2024 06:00 )             28.5       WBC Trend: 4.38<--, 5.08<--, 5.61<--  Hb Trend: 9.9<--, 10.2<--, 10.4<--, 12.1<--, 12.5<--    04-13    137  |  97<L>  |  43<H>  ----------------------------<  227<H>  2.4<LL>   |  20<L>  |  1.15    Ca    8.9      13 Apr 2024 06:00  Phos  3.1     04-13  Mg     2.10     04-13    TPro  5.0<L>  /  Alb  4.0  /  TBili  38.3<H>  /  DBili  x   /  AST  84<H>  /  ALT  48<H>  /  AlkPhos  256<H>  04-13          Urinalysis Basic - ( 13 Apr 2024 06:00 )    Color: x / Appearance: x / SG: x / pH: x  Gluc: 227 mg/dL / Ketone: x  / Bili: x / Urobili: x   Blood: x / Protein: x / Nitrite: x   Leuk Esterase: x / RBC: x / WBC x   Sq Epi: x / Non Sq Epi: x / Bacteria: x        Culture - Fungal, Body Fluid (collected 12 Apr 2024 16:15)  Source: Ascites Fl  Preliminary Report (13 Apr 2024 23:04):    Culture is being performed. Fungal cultures are held for 4 weeks.    Culture - Body Fluid with Gram Stain (collected 12 Apr 2024 16:15)  Source: Ascites Fl  Gram Stain (12 Apr 2024 23:27):    No polymorphonuclear leukocytes per low power field    No organisms seen per oil power field  Preliminary Report (13 Apr 2024 18:38):    No growth          RADIOLOGY & ADDITIONAL TESTS: Reviewed

## 2024-04-14 NOTE — PROVIDER CONTACT NOTE (MEDICATION) - ASSESSMENT
. Family educated on importance of heparin and insulin using teach back method. Family verbalized understanding and still refused.

## 2024-04-14 NOTE — PROGRESS NOTE ADULT - PROBLEM SELECTOR PLAN 5
hypoglycemic to 50s on admission likely iso sulfonylurea use   Continues to have hypoglycemic episodes to 50s;   Endocrine consulted  -Low TSH normal T4, normal cortisol     - D50 PRN for severe hypoglycemia   - Diet started + glucerna 2x/day  - CTM finger sticks w/ meals  - sulfonylurea levels  - Nutrition consult Resolved  hypoglycemic to 50s on admission likely iso sulfonylurea use   Continues to have hypoglycemic episodes to 50s;   Endocrine consulted  -Low TSH normal T4, normal cortisol     - D50 PRN for severe hypoglycemia   - Diet started + glucerna 2x/day  - CTM finger sticks w/ meals  - sulfonylurea levels  - Nutrition consult

## 2024-04-14 NOTE — PROGRESS NOTE ADULT - PROBLEM SELECTOR PLAN 7
Hypoglycemic currently iso hepatic failure vs. Sulfonylurea use   discontinue home Glimepiride Hypoglycemic initially likely iso home Sulfonylurea use   discontinue home Glimepiride  f/u sulfonylurea levels     VERITO given random glucose throughout day has been in mid 200s

## 2024-04-14 NOTE — PROGRESS NOTE ADULT - PROBLEM SELECTOR PLAN 2
- Dx in 3/2024, in OSH, no Bx performed, no Tx offered, sent home on Hospice   -  ALT 97 Tbili 34.9 on admission; Mixed elevated Direct (>20) and Indirect bili (13.8)  Family would like second opinion on treatment options by Oncology team   - Obtained Imaging records from [facility]   - Significant elevated >200 AFP on outpatient bloodwork;  this admission     Plan:   Consulted [facility] Oncology for 2nd opinion; not candidate for curative tx at this time  Consulted Hepatology  Palliative Consulted    Ammonia elevated to 100s but mentating well possibly falsely elevated; bm 4x/day    - Trend AST/ALT   - Lactulose 10 BID titrate to 3-4 BM  - Octreotide 50 and CTX (4/11-) 5-day for SBP prophylaxis   - c/w maintenance albumin  - Hold home Spironolactone and Lasix iso hypotension  - Dx para pending - Dx in 3/2024, in OSH, no Bx performed, no Tx offered, sent home on Hospice   -  ALT 97 Tbili 34.9 on admission; Mixed elevated Direct (>20) and Indirect bili (13.8)  Family would like second opinion on treatment options by Oncology team   - Obtained Imaging records from [facility]   - Significant elevated >200 AFP on outpatient bloodwork;  this admission     Plan:   Consulted [facility] Oncology for 2nd opinion; not candidate for curative tx at this time  Consulted Hepatology  Palliative Consulted    Ammonia elevated to 100s but mentating well possibly falsely elevated; bm 4x/day    - Trend AST/ALT   - Lactulose 10 BID titrate to 3-4 BM  - Octreotide 50 and CTX (4/11-) 5-day for SBP prophylaxis   - c/w maintenance albumin  - Hold home Spironolactone and Lasix iso hypotension  - Dx Paracentesis no evidence of SBP, SAAG >1.1 effusion likely 2/2 cirrhosis.

## 2024-04-14 NOTE — PROGRESS NOTE ADULT - PROBLEM SELECTOR PLAN 3
Improving   - Hyponatremic to 125 w/ Urine sodium <20; likely iso very poor PO intake   - Red < 20, Uosm >330; Patient appears floridly overloaded; likely iso Cirrhosis      Plan:  restrict fluids  Low sodium diet due to cirrhosis  cmp Q12

## 2024-04-15 ENCOUNTER — TRANSCRIPTION ENCOUNTER (OUTPATIENT)
Age: 74
End: 2024-04-15

## 2024-04-15 VITALS
HEART RATE: 72 BPM | OXYGEN SATURATION: 99 % | RESPIRATION RATE: 18 BRPM | SYSTOLIC BLOOD PRESSURE: 160 MMHG | TEMPERATURE: 98 F | DIASTOLIC BLOOD PRESSURE: 82 MMHG

## 2024-04-15 LAB
CULTURE RESULTS: SIGNIFICANT CHANGE UP
CULTURE RESULTS: SIGNIFICANT CHANGE UP
GLUCOSE BLDC GLUCOMTR-MCNC: 190 MG/DL — HIGH (ref 70–99)
GLUCOSE BLDC GLUCOMTR-MCNC: 203 MG/DL — HIGH (ref 70–99)
SPECIMEN SOURCE: SIGNIFICANT CHANGE UP
SPECIMEN SOURCE: SIGNIFICANT CHANGE UP

## 2024-04-15 PROCEDURE — 99239 HOSP IP/OBS DSCHRG MGMT >30: CPT

## 2024-04-15 PROCEDURE — 99232 SBSQ HOSP IP/OBS MODERATE 35: CPT | Mod: GC

## 2024-04-15 RX ORDER — LACTULOSE 10 G/15ML
22.5 SOLUTION ORAL
Qty: 2025 | Refills: 0
Start: 2024-04-15 | End: 2024-05-14

## 2024-04-15 RX ORDER — LIDOCAINE 4 G/100G
1 CREAM TOPICAL
Qty: 30 | Refills: 0
Start: 2024-04-15 | End: 2024-05-14

## 2024-04-15 RX ORDER — MORPHINE SULFATE 50 MG/1
0.5 CAPSULE, EXTENDED RELEASE ORAL
Qty: 90 | Refills: 0
Start: 2024-04-15 | End: 2024-05-14

## 2024-04-15 RX ORDER — GLIMEPIRIDE 1 MG
1 TABLET ORAL
Refills: 0 | DISCHARGE

## 2024-04-15 RX ORDER — LANOLIN ALCOHOL/MO/W.PET/CERES
2 CREAM (GRAM) TOPICAL
Qty: 60 | Refills: 0
Start: 2024-04-15 | End: 2024-05-14

## 2024-04-15 RX ORDER — HALOPERIDOL DECANOATE 100 MG/ML
1 INJECTION INTRAMUSCULAR
Qty: 30 | Refills: 0
Start: 2024-04-15 | End: 2024-05-14

## 2024-04-15 RX ORDER — LACTULOSE 10 G/15ML
300 SOLUTION ORAL
Refills: 0 | DISCHARGE

## 2024-04-15 RX ADMIN — Medication 2: at 09:22

## 2024-04-15 RX ADMIN — Medication 1 APPLICATION(S): at 12:58

## 2024-04-15 RX ADMIN — LACTULOSE 15 GRAM(S): 10 SOLUTION ORAL at 06:44

## 2024-04-15 RX ADMIN — CHLORHEXIDINE GLUCONATE 1 APPLICATION(S): 213 SOLUTION TOPICAL at 13:00

## 2024-04-15 RX ADMIN — Medication 650 MILLIGRAM(S): at 13:01

## 2024-04-15 RX ADMIN — Medication 1: at 12:58

## 2024-04-15 RX ADMIN — Medication 1 APPLICATION(S): at 06:44

## 2024-04-15 RX ADMIN — OCTREOTIDE ACETATE 10 MICROGRAM(S)/HR: 200 INJECTION, SOLUTION INTRAVENOUS; SUBCUTANEOUS at 01:51

## 2024-04-15 NOTE — PROGRESS NOTE ADULT - PROBLEM SELECTOR PLAN 1
Acutely altered on 4/13 in the morning. Unable to follow commands and limited neuro exam without abnormality (able to stand).   - Continues to have AMS 4/14 AM; confusion worsens at night.   - Patient has not slept since admission; melatonin not working per family       - Increase lactulose to 15g TID; s/p Rifaximin; Stool counts   - Stat dose Dilaudid IV 0.5 given 4/14 AM; add on Oxycodone 2.5 PO standing given patient likely awake due to pain.  - 0.5 Haldol QHS (QTc 441)

## 2024-04-15 NOTE — DISCHARGE NOTE NURSING/CASE MANAGEMENT/SOCIAL WORK - PATIENT PORTAL LINK FT
You can access the FollowMyHealth Patient Portal offered by [facility] by registering at the following website: [email or web address]. By joining [facility]’s FollowMyHealth portal, you will also be able to view your health information using other applications (apps) compatible with our system.

## 2024-04-15 NOTE — PROGRESS NOTE ADULT - TIME BILLING
Time spent includes direct patient care  (interview and examination of patient), discussion with other providers, support staff and/or patient's family members, review of medical records, ordering diagnostic tests and analyzing results, and documentation.
Time spent for extensive review of the physical chart, electronic medical record, and documentation to obtain collateral information including but not limited to:  [x] Inpatient records (ED, H&P, primary team, and consultants if applicable, care coordination)  [x] Inpatient values/results (biomarkers, immunoassays, imaging, and microbiology results)  [x] Current or proposed treatment plans  [x] Pharmacotherapy review  [x] Discussion and coordinating care with primary team and interdisciplinary staff and floor staff  [x] Discussion including counseling/ education with the patient, surrogate decision maker, or family    In addition to above time, Spent 16 minutes separately discussing goals of care/ advanced care planning with patient/ family
Time spent includes direct patient care  (interview and examination of patient), discussion with other providers, support staff and/or patient's family members, review of medical records, ordering diagnostic tests and analyzing results, and documentation.
Time spent includes direct patient care  (interview and examination of patient), discussion with other providers, support staff and/or patient's family members, review of medical records, ordering diagnostic tests and analyzing results, and documentation.

## 2024-04-15 NOTE — PROGRESS NOTE ADULT - PROBLEM SELECTOR PLAN 5
Resolved  hypoglycemic to 50s on admission likely iso sulfonylurea use   Continues to have hypoglycemic episodes to 50s;   Endocrine consulted  -Low TSH normal T4, normal cortisol     - D50 PRN for severe hypoglycemia   - Diet started + glucerna 2x/day  - CTM finger sticks w/ meals  - sulfonylurea levels  - Nutrition consult

## 2024-04-15 NOTE — PROGRESS NOTE ADULT - PROBLEM SELECTOR PLAN 8
DVT PPx: Heparin SubQ   Gi PPx: Pantoprazole  Diet: pescatarian diet 2x/d glucerna  PT/OT: Hold off until hemodynamically stable  Code Status: Full code   Dispo: Pending

## 2024-04-15 NOTE — PROGRESS NOTE ADULT - REASON FOR ADMISSION
Weakness and SOB

## 2024-04-15 NOTE — PROGRESS NOTE ADULT - SUBJECTIVE AND OBJECTIVE BOX
Progress Note  Authored by:   Juan A Amos MD   PGY-1 Internal Medicine    04-10-24 (5d)    Patient is a 74y old  Female who presents with a chief complaint of Weakness and SOB (13 Apr 2024 07:58)      Subjective / Overnight Events :  - No acute events overnight. AOx0 this AM, confused. Per family has been having 2-3 BM per day and has not slept much since admission. Also states patient may have sciatica pain   - Pt seen and examined at bedside.     Additional ROS (if any):    MEDICATIONS  (STANDING):  cefTRIAXone   IVPB 1000 milliGRAM(s) IV Intermittent every 24 hours  chlorhexidine 2% Cloths 1 Application(s) Topical daily  heparin   Injectable 5000 Unit(s) SubCutaneous every 8 hours  influenza  Vaccine (HIGH DOSE) 0.7 milliLiter(s) IntraMuscular once  lactulose Syrup 10 Gram(s) Oral three times a day  melatonin 6 milliGRAM(s) Oral at bedtime  octreotide  Infusion 50 MICROgram(s)/Hr (10 mL/Hr) IV Continuous <Continuous>  oxyCODONE    IR 2.5 milliGRAM(s) Oral once  pantoprazole    Tablet 40 milliGRAM(s) Oral before breakfast  petrolatum white Ointment 1 Application(s) Topical every 6 hours  phytonadione   Solution 5 milliGRAM(s) Oral daily  sodium bicarbonate 650 milliGRAM(s) Oral daily    MEDICATIONS  (PRN):  lidocaine   4% Patch 1 Patch Transdermal daily PRN back Pain  simethicone 80 milliGRAM(s) Chew three times a day PRN Gas          PHYSICAL EXAM:  Vital Signs Last 24 Hrs  T(C): 36.4 (13 Apr 2024 20:53), Max: 36.4 (13 Apr 2024 09:00)  T(F): 97.6 (13 Apr 2024 20:53), Max: 97.6 (13 Apr 2024 20:53)  HR: 94 (13 Apr 2024 20:53) (68 - 94)  BP: 156/76 (13 Apr 2024 20:53) (147/65 - 156/76)  BP(mean): --  RR: 16 (13 Apr 2024 20:53) (16 - 18)  SpO2: 100% (13 Apr 2024 20:53) (100% - 100%)    Parameters below as of 13 Apr 2024 20:53  Patient On (Oxygen Delivery Method): room air        I&O's Summary    13 Apr 2024 07:01  -  14 Apr 2024 07:00  --------------------------------------------------------  IN: 600 mL / OUT: 600 mL / NET: 0 mL        General: NAD, non-toxic appearing   HEENT: EOMi, positive scleral icterus  CV: RRR, normal S1 and S2, no m/r/g  Lungs: normal respiratory effort. CTAB, no wheezes, rales, or rhonchi  Abd: soft, nontender, distended  Ext: 2+ edema, 2+ peripheral pulses   Pysch: AOx0; confused this AM. Awake but unable to follow commands. Protecting airway    Neuro: grossly non-focal, moving all extremities spontaneously   Skin: no rashes or lesions, Jaundice     LABS:  CAPILLARY BLOOD GLUCOSE      POCT Blood Glucose.: 211 mg/dL (13 Apr 2024 18:16)  POCT Blood Glucose.: 216 mg/dL (13 Apr 2024 08:52)                              9.9    4.38  )-----------( 80       ( 13 Apr 2024 06:00 )             28.5       WBC Trend: 4.38<--, 5.08<--, 5.61<--  Hb Trend: 9.9<--, 10.2<--, 10.4<--, 12.1<--, 12.5<--    04-13    137  |  97<L>  |  43<H>  ----------------------------<  227<H>  2.4<LL>   |  20<L>  |  1.15    Ca    8.9      13 Apr 2024 06:00  Phos  3.1     04-13  Mg     2.10     04-13    TPro  5.0<L>  /  Alb  4.0  /  TBili  38.3<H>  /  DBili  x   /  AST  84<H>  /  ALT  48<H>  /  AlkPhos  256<H>  04-13          Urinalysis Basic - ( 13 Apr 2024 06:00 )    Color: x / Appearance: x / SG: x / pH: x  Gluc: 227 mg/dL / Ketone: x  / Bili: x / Urobili: x   Blood: x / Protein: x / Nitrite: x   Leuk Esterase: x / RBC: x / WBC x   Sq Epi: x / Non Sq Epi: x / Bacteria: x        Culture - Fungal, Body Fluid (collected 12 Apr 2024 16:15)  Source: Ascites Fl  Preliminary Report (13 Apr 2024 23:04):    Culture is being performed. Fungal cultures are held for 4 weeks.    Culture - Body Fluid with Gram Stain (collected 12 Apr 2024 16:15)  Source: Ascites Fl  Gram Stain (12 Apr 2024 23:27):    No polymorphonuclear leukocytes per low power field    No organisms seen per oil power field  Preliminary Report (13 Apr 2024 18:38):    No growth          RADIOLOGY & ADDITIONAL TESTS: Reviewed     Progress Note  Authored by:   Juan A Amos MD   PGY-1 Internal Medicine    04-10-24 (5d)    Patient is a 74y old  Female who presents with a chief complaint of Weakness and SOB (13 Apr 2024 07:58)      Subjective / Overnight Events :  - No acute events overnight. Family refusing Insulin, octreotide and further lab draws.  - Pt seen and examined at bedside.     Additional ROS (if any):    MEDICATIONS  (STANDING):  cefTRIAXone   IVPB 1000 milliGRAM(s) IV Intermittent every 24 hours  chlorhexidine 2% Cloths 1 Application(s) Topical daily  heparin   Injectable 5000 Unit(s) SubCutaneous every 8 hours  influenza  Vaccine (HIGH DOSE) 0.7 milliLiter(s) IntraMuscular once  lactulose Syrup 10 Gram(s) Oral three times a day  melatonin 6 milliGRAM(s) Oral at bedtime  octreotide  Infusion 50 MICROgram(s)/Hr (10 mL/Hr) IV Continuous <Continuous>  oxyCODONE    IR 2.5 milliGRAM(s) Oral once  pantoprazole    Tablet 40 milliGRAM(s) Oral before breakfast  petrolatum white Ointment 1 Application(s) Topical every 6 hours  phytonadione   Solution 5 milliGRAM(s) Oral daily  sodium bicarbonate 650 milliGRAM(s) Oral daily    MEDICATIONS  (PRN):  lidocaine   4% Patch 1 Patch Transdermal daily PRN back Pain  simethicone 80 milliGRAM(s) Chew three times a day PRN Gas          PHYSICAL EXAM:  Vital Signs Last 24 Hrs  T(C): 36.6 (15 Apr 2024 05:36), Max: 36.6 (14 Apr 2024 14:47)  T(F): 97.8 (15 Apr 2024 05:36), Max: 97.9 (14 Apr 2024 14:47)  HR: 72 (15 Apr 2024 05:36) (72 - 86)  BP: 160/82 (15 Apr 2024 05:36) (131/58 - 170/74)  BP(mean): --  RR: 18 (15 Apr 2024 05:36) (17 - 18)  SpO2: 99% (15 Apr 2024 05:36) (97% - 100%)    Parameters below as of 15 Apr 2024 05:36  Patient On (Oxygen Delivery Method): room air        I&O's Summary    13 Apr 2024 07:01  -  14 Apr 2024 07:00  --------------------------------------------------------  IN: 600 mL / OUT: 600 mL / NET: 0 mL        General: NAD, non-toxic appearing   HEENT: EOMi, positive scleral icterus  CV: RRR, normal S1 and S2, no m/r/g  Lungs: normal respiratory effort. CTAB, no wheezes, rales, or rhonchi  Abd: soft, nontender, distended  Ext: 2+ edema, 2+ peripheral pulses   Pysch: AOx0; confused this AM. Awake but unable to follow commands. Protecting airway    Neuro: grossly non-focal, moving all extremities spontaneously   Skin: no rashes or lesions, Jaundice     LABS:  CAPILLARY BLOOD GLUCOSE      POCT Blood Glucose.: 211 mg/dL (13 Apr 2024 18:16)  POCT Blood Glucose.: 216 mg/dL (13 Apr 2024 08:52)                              9.9    4.38  )-----------( 80       ( 13 Apr 2024 06:00 )             28.5       WBC Trend: 4.38<--, 5.08<--, 5.61<--  Hb Trend: 9.9<--, 10.2<--, 10.4<--, 12.1<--, 12.5<--    04-13    137  |  97<L>  |  43<H>  ----------------------------<  227<H>  2.4<LL>   |  20<L>  |  1.15    Ca    8.9      13 Apr 2024 06:00  Phos  3.1     04-13  Mg     2.10     04-13    TPro  5.0<L>  /  Alb  4.0  /  TBili  38.3<H>  /  DBili  x   /  AST  84<H>  /  ALT  48<H>  /  AlkPhos  256<H>  04-13          Urinalysis Basic - ( 13 Apr 2024 06:00 )    Color: x / Appearance: x / SG: x / pH: x  Gluc: 227 mg/dL / Ketone: x  / Bili: x / Urobili: x   Blood: x / Protein: x / Nitrite: x   Leuk Esterase: x / RBC: x / WBC x   Sq Epi: x / Non Sq Epi: x / Bacteria: x        Culture - Fungal, Body Fluid (collected 12 Apr 2024 16:15)  Source: Ascites Fl  Preliminary Report (13 Apr 2024 23:04):    Culture is being performed. Fungal cultures are held for 4 weeks.    Culture - Body Fluid with Gram Stain (collected 12 Apr 2024 16:15)  Source: Ascites Fl  Gram Stain (12 Apr 2024 23:27):    No polymorphonuclear leukocytes per low power field    No organisms seen per oil power field  Preliminary Report (13 Apr 2024 18:38):    No growth          RADIOLOGY & ADDITIONAL TESTS: Reviewed     Progress Note  Authored by:   Juan A Amos MD   PGY-1 Internal Medicine    04-10-24 (5d)    Patient is a 74y old  Female who presents with a chief complaint of Weakness and SOB (13 Apr 2024 07:58)      Subjective / Overnight Events :  - No acute events overnight. Family refusing Insulin, octreotide and further lab draws. Requesting she go home with hospice.   - Pt seen and examined at bedside.     Additional ROS (if any):    MEDICATIONS  (STANDING):  cefTRIAXone   IVPB 1000 milliGRAM(s) IV Intermittent every 24 hours  chlorhexidine 2% Cloths 1 Application(s) Topical daily  heparin   Injectable 5000 Unit(s) SubCutaneous every 8 hours  influenza  Vaccine (HIGH DOSE) 0.7 milliLiter(s) IntraMuscular once  lactulose Syrup 10 Gram(s) Oral three times a day  melatonin 6 milliGRAM(s) Oral at bedtime  octreotide  Infusion 50 MICROgram(s)/Hr (10 mL/Hr) IV Continuous <Continuous>  oxyCODONE    IR 2.5 milliGRAM(s) Oral once  pantoprazole    Tablet 40 milliGRAM(s) Oral before breakfast  petrolatum white Ointment 1 Application(s) Topical every 6 hours  phytonadione   Solution 5 milliGRAM(s) Oral daily  sodium bicarbonate 650 milliGRAM(s) Oral daily    MEDICATIONS  (PRN):  lidocaine   4% Patch 1 Patch Transdermal daily PRN back Pain  simethicone 80 milliGRAM(s) Chew three times a day PRN Gas          PHYSICAL EXAM:  Vital Signs Last 24 Hrs  T(C): 36.6 (15 Apr 2024 05:36), Max: 36.6 (14 Apr 2024 14:47)  T(F): 97.8 (15 Apr 2024 05:36), Max: 97.9 (14 Apr 2024 14:47)  HR: 72 (15 Apr 2024 05:36) (72 - 86)  BP: 160/82 (15 Apr 2024 05:36) (131/58 - 170/74)  BP(mean): --  RR: 18 (15 Apr 2024 05:36) (17 - 18)  SpO2: 99% (15 Apr 2024 05:36) (97% - 100%)    Parameters below as of 15 Apr 2024 05:36  Patient On (Oxygen Delivery Method): room air        I&O's Summary    13 Apr 2024 07:01  -  14 Apr 2024 07:00  --------------------------------------------------------  IN: 600 mL / OUT: 600 mL / NET: 0 mL        General: NAD, non-toxic appearing   HEENT: EOMi, positive scleral icterus  CV: RRR, normal S1 and S2, no m/r/g  Lungs: normal respiratory effort. CTAB, no wheezes, rales, or rhonchi  Abd: soft, nontender, distended  Ext: 2+ edema, 2+ peripheral pulses   Pysch: AOx0; confused this AM. Awake but unable to follow commands. Protecting airway    Neuro: grossly non-focal, moving all extremities spontaneously   Skin: no rashes or lesions, Jaundice     LABS:  CAPILLARY BLOOD GLUCOSE      POCT Blood Glucose.: 211 mg/dL (13 Apr 2024 18:16)  POCT Blood Glucose.: 216 mg/dL (13 Apr 2024 08:52)                              9.9    4.38  )-----------( 80       ( 13 Apr 2024 06:00 )             28.5       WBC Trend: 4.38<--, 5.08<--, 5.61<--  Hb Trend: 9.9<--, 10.2<--, 10.4<--, 12.1<--, 12.5<--    04-13    137  |  97<L>  |  43<H>  ----------------------------<  227<H>  2.4<LL>   |  20<L>  |  1.15    Ca    8.9      13 Apr 2024 06:00  Phos  3.1     04-13  Mg     2.10     04-13    TPro  5.0<L>  /  Alb  4.0  /  TBili  38.3<H>  /  DBili  x   /  AST  84<H>  /  ALT  48<H>  /  AlkPhos  256<H>  04-13          Urinalysis Basic - ( 13 Apr 2024 06:00 )    Color: x / Appearance: x / SG: x / pH: x  Gluc: 227 mg/dL / Ketone: x  / Bili: x / Urobili: x   Blood: x / Protein: x / Nitrite: x   Leuk Esterase: x / RBC: x / WBC x   Sq Epi: x / Non Sq Epi: x / Bacteria: x        Culture - Fungal, Body Fluid (collected 12 Apr 2024 16:15)  Source: Ascites Fl  Preliminary Report (13 Apr 2024 23:04):    Culture is being performed. Fungal cultures are held for 4 weeks.    Culture - Body Fluid with Gram Stain (collected 12 Apr 2024 16:15)  Source: Ascites Fl  Gram Stain (12 Apr 2024 23:27):    No polymorphonuclear leukocytes per low power field    No organisms seen per oil power field  Preliminary Report (13 Apr 2024 18:38):    No growth          RADIOLOGY & ADDITIONAL TESTS: Reviewed

## 2024-04-15 NOTE — PROGRESS NOTE ADULT - ASSESSMENT
Ms. 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     #Decompensated HCV-related cirrhosis  #Acute kidney injury, resolved   #Hepatocellular carcinoma  #Tumor thrombus  #Ascites  #Hyponatremia, resolved   #Delirium   Patient with HCV-related cirrhosis, s/p Harvoni (2015) with SVR, likely underlying MASLD as well, follows at [facility]. She had mild constitutional changes over the past year with initial decompensating event of ascites around January 2024 requiring large-volume paracentesis. During a recent hospital stay at [facility], patient was diagnosed with HCC and tumor thrombus, not offered treatment and discharged on home hospice. She returns for symptomatic ascites and second opinion regarding cirrhosis/HCC treatment options.   MELD 3.0 score = 37  HCC: Non-contrast CT with large right hepatic lobe heterogeneous hypoattenuating lesion measuring 11.1 x 7.7 cm, consistent with history of hepatocellular carcinoma. 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 MRI Abdomen performed at [facility] on 3/16/24 was reviewed and notable for a large occlusive tumor thrombus in the main portal vein extending into the right and left portal vein with likely infiltrative HCC. AFP level is 253. CT Chest with moderate right pleural effusion, metastatic disease cannot be excluded.   MIGUELANGEL: Creatinine 2.43 from recent baseline of 0.6, urine Na < 20, DDx includes renal hypoperfusion due to dehydration / hypotension, possibly hepatorenal syndrome given decompensated cirrhosis and ascites, possible SBP; improved with IV albumin/octreotide  Ascites: diagnostic tap performed 4/12 neg for SBP (however performed after already on Abx) - favor empiric course of Abx; total protein 1.6, s/p paracentesis x2 prior to admission - 10L in [place] (Feb '24), 2.5L @ [facility] (4/4), on lasix 20 mg and aldactone 25 mg which are held due to MIGUELANGEL  Esophageal varices: noted on prior EGDs, on Coreg 3.125 mg (held due to hypotension/MIGUELANGEL), no prior hemorrhage or banding  Hepatic encephalopathy: none on exam or by history, on lactulose for prevention due to prior constipation     Recommendations:  - Obtain repeat CMP   - If kidney function remains improved, would stop octreotide gtt and resume home BB as well as diuretics (lasix 20, Aldactone 50)  - May follow up for outpatient drain placement if within goals of care  - Empiric ceftriaxone, to complete day 5/5 today MIGUELANGEL  - Anticoagulation not recommended for tumor thrombus  - Bowel regimen to goal of soft stool daily   - Low sodium diet  - Trend CBC, CMP and PT/INR daily   - No role for ERCP in the absence of biliary duct dilatation. Hyperbilirubinemia is due to cirrhosis and HCC. Bile ducts are normal caliber. Reviewed with advanced GI fellow.   - Due to infiltrative HCC with tumor thrombus, patient is not a candidate for liver transplantation. Patient and family were made aware. All questions and concerns were addressed.   - Appreciate Palliative Care consultation   - If electrolytes / kidney function remain improved, there is no Hepatologic contraindication to discharge with Palliative Care f/u    Ric Colvin MD  Gastroenterology/Hepatology Fellow  Available via Microsoft Teams  Pager: [phone]    NON-URGENT CONSULTS:  Please email [email or web address]  AT NIGHT AND ON WEEKENDS:  Contact on-call GI fellow via answering service ([phone]) from 5pm-8am and on weekends/holidays  MONDAY-FRIDAY 8AM-5PM:  Pager# [phone] ([facility]) or [phone] ([facility]) Ms. 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     #Decompensated HCV-related cirrhosis  #Acute kidney injury, resolved   #Hepatocellular carcinoma  #Tumor thrombus  #Ascites  #Hyponatremia, resolved   #Delirium   Patient with HCV-related cirrhosis, s/p Harvoni (2015) with SVR, likely underlying MASLD as well, follows at [facility]. She had mild constitutional changes over the past year with initial decompensating event of ascites around January 2024 requiring large-volume paracentesis. During a recent hospital stay at [facility], patient was diagnosed with HCC and tumor thrombus, not offered treatment and discharged on home hospice. She returns for symptomatic ascites and second opinion regarding cirrhosis/HCC treatment options.   MELD 3.0 score = 37  HCC: Non-contrast CT with large right hepatic lobe heterogeneous hypoattenuating lesion measuring 11.1 x 7.7 cm, consistent with history of hepatocellular carcinoma. 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 MRI Abdomen performed at [facility] on 3/16/24 was reviewed and notable for a large occlusive tumor thrombus in the main portal vein extending into the right and left portal vein with likely infiltrative HCC. AFP level is 253. CT Chest with moderate right pleural effusion, metastatic disease cannot be excluded.   MIGUELANGEL: Creatinine 2.43 from recent baseline of 0.6, urine Na < 20, DDx includes renal hypoperfusion due to dehydration / hypotension, possibly hepatorenal syndrome given decompensated cirrhosis and ascites, possible SBP; improved with IV albumin/octreotide  Ascites: diagnostic tap performed 4/12 neg for SBP (however performed after already on Abx) - favor empiric course of Abx; total protein 1.6, s/p paracentesis x2 prior to admission - 10L in [place] (Feb '24), 2.5L @ [facility] (4/4), on lasix 20 mg and aldactone 25 mg which are held due to MIGUELANGEL  Esophageal varices: noted on prior EGDs, on Coreg 3.125 mg (held due to hypotension/MIGUELANGEL), no prior hemorrhage or banding  Hepatic encephalopathy: none on exam or by history, on lactulose for prevention due to prior constipation     Recommendations:  - Obtain repeat CMP   - If kidney function remains improved, would stop octreotide gtt and resume home BB as well as diuretics (lasix 20, Aldactone 50)  - May follow up for outpatient drain placement if within goals of care  - Empiric ceftriaxone, to complete day 5/5 today   - Anticoagulation not recommended for tumor thrombus  - Bowel regimen to goal of soft stool daily   - Low sodium diet  - Trend CBC, CMP and PT/INR daily   - No role for ERCP in the absence of biliary duct dilatation. Hyperbilirubinemia is due to cirrhosis and HCC. Bile ducts are normal caliber. Reviewed with advanced GI fellow.   - Due to infiltrative HCC with tumor thrombus, patient is not a candidate for liver transplantation. Patient and family were made aware. All questions and concerns were addressed.   - Appreciate Palliative Care consultation   - If electrolytes / kidney function remain improved, there is no Hepatologic contraindication to discharge with Palliative Care f/u    Ric Colvin MD  Gastroenterology/Hepatology Fellow  Available via Microsoft Teams  Pager: [phone]    NON-URGENT CONSULTS:  Please email [email or web address]  AT NIGHT AND ON WEEKENDS:  Contact on-call GI fellow via answering service ([phone]) from 5pm-8am and on weekends/holidays  MONDAY-FRIDAY 8AM-5PM:  Pager# [phone] ([facility]) or [phone] ([facility])

## 2024-04-15 NOTE — PROGRESS NOTE ADULT - ASSESSMENT
74F HCC 2/2 chronic Hep C s/p harvoni 2015, decomensated cirrhosis (ascites), CAD (stress test), T2DM, HTN, HLD, with recent admission to [facility] for worsening jaundice, found to have infiltrative HCC w/ extensive tumor thrombus initially discharged to home hospice presents to [facility] with new onset chest tightness as well as 1-2 weeks of worsening lethargy/failure to thrive.

## 2024-04-15 NOTE — PROGRESS NOTE ADULT - PROBLEM SELECTOR PLAN 7
Hypoglycemic initially likely iso home Sulfonylurea use   discontinue home Glimepiride  f/u sulfonylurea levels     VERITO given random glucose throughout day has been in mid 200s

## 2024-04-15 NOTE — PROGRESS NOTE ADULT - ATTENDING COMMENTS
Patient seen and examined with team  Agree with above a/p by Dr Amos  care d/w resident and family at bedside    74F HTN, DM2, Hep C cirrhosis c/b HCC recent OSH dc sent on [facility] hospice presenting to the ER with weakness, chest tightness and shortness of breath. No treatment was offered by oncology team at [facility] and was managed palliatively and discharged with home hospice. Family looking for further treatment, per oncology here not treatment candidate. CTAP Cirrhosis with portal hypertension. Large right hepaticlobe heterogeneous hypoattenuating lesion measuring  1.1 x 7.7 cm, consistent with history of hepatocellular carcinoma. Dilated main portal vein measuring up to 3.1 cm. Differentials include bland versus tumor thrombosis   Son notes she is "doing better today" just want her home with hospice  PE nad, JAUNDICED BUT LOOKS COMFORTABLE  Vital Signs Last 24 Hrs  T(F): 97.8 HR: 72 , BP: 160/82 , RR: 18, SpO2: 99% room air  lungs cta, cor rrr, abd soft distended, ext trace edema b/l    A/P  # FTT/ advanced Hepatocellular Ca with hyperbilirubinemia w/o evidence of obstruction on Ct, hypoglycemia sec to Liver abn and poor po intake, hyponatremia, hypotension on admission most likely sec to hepatorenal syndrome with elevated creatinine.  - Cr better  - confusion, delirium (not sleeping, multiple hospital stays) vs PSE (poor liver fxn/cirrhosis/HCC), reorient, lactulose, rifaxmin, advised family not uncommon in advance malignancy   - s/p para, not c/w SBP, Ceftrixone day 5/5 today completed.  - [facility] Onc consult appreciated. no oncological treatment recommended, Palliative/ hospice eval done---> home with hospice today  - Cr improving, per RN voiding substantially; 1 BM on 4/12 and 4/13, likely not enough, increase lactulose   - Morphine solution prn pain at home   -  hadol low dose qhs 0.5 mg for delirium/agitation    [facility]  care d/w family at bedside--plan is still for possible dc today wt home  hospice, meds to bed, CM will arrange transport at 2 pm  pland/w patient/ son and family at bedside/ team/ cm  60 min to coord d/c      HCP Son Neptali Stewart [phone], [phone], #2 is daughter Andrews Stewart [phone]  full code, guarded prognosis

## 2024-04-15 NOTE — PROGRESS NOTE ADULT - PROVIDER SPECIALTY LIST ADULT
Hepatology
Hepatology
Internal Medicine
Internal Medicine
Endocrinology
Internal Medicine
Internal Medicine
Palliative Care
Internal Medicine

## 2024-04-15 NOTE — PROGRESS NOTE ADULT - PROBLEM SELECTOR PLAN 2
- Dx in 3/2024, in OSH, no Bx performed, no Tx offered, sent home on Hospice   -  ALT 97 Tbili 34.9 on admission; Mixed elevated Direct (>20) and Indirect bili (13.8)  Family would like second opinion on treatment options by Oncology team   - Obtained Imaging records from [facility]   - Significant elevated >200 AFP on outpatient bloodwork;  this admission     Plan:   Consulted [facility] Oncology for 2nd opinion; not candidate for curative tx at this time  Consulted Hepatology  Palliative Consulted    Ammonia elevated to 100s but mentating well possibly falsely elevated; bm 4x/day    - Trend AST/ALT   - Lactulose 10 BID titrate to 3-4 BM  - Octreotide 50 and CTX (4/11-) 5-day for SBP prophylaxis   - c/w maintenance albumin  - Hold home Spironolactone and Lasix iso hypotension  - Dx Paracentesis no evidence of SBP, SAAG >1.1 effusion likely 2/2 cirrhosis.

## 2024-04-15 NOTE — PROGRESS NOTE ADULT - SUBJECTIVE AND OBJECTIVE BOX
Interval Events:   Patient's family reports significant delirium throughout the weekend, patient confused and occasionally agitate/aggressive which is unusual for her  Patient without complaints, resting in bed  Repeat labs pending    Hospital Medications:  cefTRIAXone   IVPB 1000 milliGRAM(s) IV Intermittent every 24 hours  chlorhexidine 2% Cloths 1 Application(s) Topical daily  dextrose 10% Bolus 125 milliLiter(s) IV Bolus once  dextrose 5%. 1000 milliLiter(s) IV Continuous <Continuous>  dextrose 5%. 1000 milliLiter(s) IV Continuous <Continuous>  dextrose 50% Injectable 12.5 Gram(s) IV Push once  dextrose 50% Injectable 25 Gram(s) IV Push once  dextrose Oral Gel 15 Gram(s) Oral once PRN  glucagon  Injectable 1 milliGRAM(s) IntraMuscular once  haloperidol     Tablet 0.5 milliGRAM(s) Oral <User Schedule>  heparin   Injectable 5000 Unit(s) SubCutaneous every 8 hours  influenza  Vaccine (HIGH DOSE) 0.7 milliLiter(s) IntraMuscular once  insulin lispro (ADMELOG) corrective regimen sliding scale   SubCutaneous three times a day before meals  insulin lispro (ADMELOG) corrective regimen sliding scale   SubCutaneous at bedtime  lactulose Syrup 15 Gram(s) Oral three times a day  lidocaine   4% Patch 1 Patch Transdermal daily PRN  melatonin 6 milliGRAM(s) Oral at bedtime  octreotide  Infusion 50 MICROgram(s)/Hr IV Continuous <Continuous>  oxyCODONE    IR 5 milliGRAM(s) Oral every 4 hours PRN  pantoprazole    Tablet 40 milliGRAM(s) Oral before breakfast  petrolatum white Ointment 1 Application(s) Topical every 6 hours  simethicone 80 milliGRAM(s) Chew three times a day PRN  sodium bicarbonate 650 milliGRAM(s) Oral daily    ROS: See above.    PHYSICAL EXAM:   Vital Signs:  Vital Signs Last 24 Hrs  T(C): 36.6 (15 Apr 2024 05:36), Max: 36.6 (14 Apr 2024 14:47)  T(F): 97.8 (15 Apr 2024 05:36), Max: 97.9 (14 Apr 2024 14:47)  HR: 72 (15 Apr 2024 05:36) (72 - 86)  BP: 160/82 (15 Apr 2024 05:36) (131/58 - 170/74)  BP(mean): --  RR: 18 (15 Apr 2024 05:36) (17 - 18)  SpO2: 99% (15 Apr 2024 05:36) (97% - 100%)    Parameters below as of 15 Apr 2024 05:36  Patient On (Oxygen Delivery Method): room air    Daily     GENERAL:  NAD, resting comfortably in bed  HEENT:  sclera icteric  CHEST:  Normal Effort  HEART:  HDS  ABDOMEN:  Soft, non-tender, mildly distended  SKIN:  Warm & Dry.   NEURO:  Alert, conversant, no focal deficit    LABS:                          9.9    4.38  )-----------( 80       ( 13 Apr 2024 06:00 )             28.5

## 2024-04-15 NOTE — PROGRESS NOTE ADULT - ATTENDING COMMENTS
IR fu scheduled for Friday, sons at bedside opted against repeat bloodwork. Would discharge without diuretics, poss. peritoneal catheter placement by IR on Friday depending on presence of signif. ascites.

## 2024-04-16 LAB — NON-GYNECOLOGICAL CYTOLOGY STUDY: SIGNIFICANT CHANGE UP

## 2024-04-17 LAB
CULTURE RESULTS: SIGNIFICANT CHANGE UP
SPECIMEN SOURCE: SIGNIFICANT CHANGE UP

## 2024-04-19 LAB
CHLORPROPAMIDE RESULT: SIGNIFICANT CHANGE UP MCG/ML
GLIMEPIRIDE RESULT: 25 NG/ML — SIGNIFICANT CHANGE UP
GLIPIZIDE RESULT: SIGNIFICANT CHANGE UP NG/ML
GLYBURIDE RESULT: SIGNIFICANT CHANGE UP NG/ML
NATEGLINIDE RESULT: SIGNIFICANT CHANGE UP MCG/ML
PIOGLITAZONE RESULT: SIGNIFICANT CHANGE UP NG/ML
REPAGLINIDE RESULT: SIGNIFICANT CHANGE UP NG/ML
ROSIGLITAZONE RESULT: SIGNIFICANT CHANGE UP NG/ML
TOLAZAMIDE RESULT: SIGNIFICANT CHANGE UP MCG/ML
TOLBUTAMIDE RESULT: SIGNIFICANT CHANGE UP MCG/ML

## 2024-05-11 LAB
CULTURE RESULTS: SIGNIFICANT CHANGE UP
SPECIMEN SOURCE: SIGNIFICANT CHANGE UP

## 2025-07-26 NOTE — CONSULT NOTE ADULT - PROBLEM/RECOMMENDATION-3
DISPLAY PLAN FREE TEXT on the discharge service for the patient. I have reviewed and made amendments to the documentation where necessary.